# Patient Record
Sex: MALE | Race: WHITE | NOT HISPANIC OR LATINO | Employment: OTHER | URBAN - METROPOLITAN AREA
[De-identification: names, ages, dates, MRNs, and addresses within clinical notes are randomized per-mention and may not be internally consistent; named-entity substitution may affect disease eponyms.]

---

## 2017-07-12 ENCOUNTER — APPOINTMENT (OUTPATIENT)
Dept: LAB | Facility: CLINIC | Age: 77
End: 2017-07-12
Payer: COMMERCIAL

## 2017-07-12 ENCOUNTER — TRANSCRIBE ORDERS (OUTPATIENT)
Dept: LAB | Facility: CLINIC | Age: 77
End: 2017-07-12

## 2017-07-12 DIAGNOSIS — I48.91 ATRIAL FIBRILLATION, UNSPECIFIED TYPE (HCC): ICD-10-CM

## 2017-07-12 DIAGNOSIS — I48.91 ATRIAL FIBRILLATION, UNSPECIFIED TYPE (HCC): Primary | ICD-10-CM

## 2017-07-12 DIAGNOSIS — R35.1 NOCTURIA: ICD-10-CM

## 2017-07-12 DIAGNOSIS — E11.9 DIABETES MELLITUS WITHOUT COMPLICATION (HCC): ICD-10-CM

## 2017-07-12 LAB
BACTERIA UR QL AUTO: NORMAL /HPF
BILIRUB UR QL STRIP: NEGATIVE
CLARITY UR: CLEAR
COLOR UR: YELLOW
EST. AVERAGE GLUCOSE BLD GHB EST-MCNC: 189 MG/DL
GLUCOSE UR STRIP-MCNC: ABNORMAL MG/DL
HBA1C MFR BLD: 8.2 % (ref 4.2–6.3)
HGB UR QL STRIP.AUTO: NEGATIVE
HYALINE CASTS #/AREA URNS LPF: NORMAL /LPF
INR PPP: 2.59 (ref 0.86–1.16)
KETONES UR STRIP-MCNC: NEGATIVE MG/DL
LEUKOCYTE ESTERASE UR QL STRIP: NEGATIVE
NITRITE UR QL STRIP: NEGATIVE
NON-SQ EPI CELLS URNS QL MICRO: NORMAL /HPF
PH UR STRIP.AUTO: 6 [PH] (ref 4.5–8)
PROT UR STRIP-MCNC: ABNORMAL MG/DL
PROTHROMBIN TIME: 28.1 SECONDS (ref 12.1–14.4)
PSA SERPL-MCNC: 1 NG/ML (ref 0–4)
RBC #/AREA URNS AUTO: NORMAL /HPF
SP GR UR STRIP.AUTO: 1.02 (ref 1–1.03)
UROBILINOGEN UR QL STRIP.AUTO: 0.2 E.U./DL
WBC #/AREA URNS AUTO: NORMAL /HPF

## 2017-07-12 PROCEDURE — G0103 PSA SCREENING: HCPCS

## 2017-07-12 PROCEDURE — 85610 PROTHROMBIN TIME: CPT

## 2017-07-12 PROCEDURE — 83036 HEMOGLOBIN GLYCOSYLATED A1C: CPT

## 2017-07-12 PROCEDURE — 36415 COLL VENOUS BLD VENIPUNCTURE: CPT

## 2017-07-12 PROCEDURE — 81001 URINALYSIS AUTO W/SCOPE: CPT | Performed by: UROLOGY

## 2018-09-10 ENCOUNTER — APPOINTMENT (EMERGENCY)
Dept: CT IMAGING | Facility: HOSPITAL | Age: 78
End: 2018-09-10
Payer: COMMERCIAL

## 2018-09-10 ENCOUNTER — HOSPITAL ENCOUNTER (EMERGENCY)
Facility: HOSPITAL | Age: 78
Discharge: OTHER INSTITUTION | End: 2018-09-11
Attending: EMERGENCY MEDICINE | Admitting: EMERGENCY MEDICINE
Payer: COMMERCIAL

## 2018-09-10 ENCOUNTER — APPOINTMENT (EMERGENCY)
Dept: RADIOLOGY | Facility: HOSPITAL | Age: 78
End: 2018-09-10
Payer: COMMERCIAL

## 2018-09-10 DIAGNOSIS — R00.1 BRADYCARDIA: ICD-10-CM

## 2018-09-10 DIAGNOSIS — G45.9 TRANSIENT CEREBRAL ISCHEMIA, UNSPECIFIED TYPE: Primary | ICD-10-CM

## 2018-09-10 DIAGNOSIS — R29.810 FACIAL DROOP: ICD-10-CM

## 2018-09-10 DIAGNOSIS — I10 ACCELERATED HYPERTENSION: ICD-10-CM

## 2018-09-10 LAB
ABO GROUP BLD: NORMAL
ANION GAP SERPL CALCULATED.3IONS-SCNC: 8 MMOL/L (ref 4–13)
APTT PPP: 36 SECONDS (ref 24–36)
BLD GP AB SCN SERPL QL: NEGATIVE
BUN SERPL-MCNC: 20 MG/DL (ref 7–25)
CALCIUM SERPL-MCNC: 8.9 MG/DL (ref 8.6–10.5)
CHLORIDE SERPL-SCNC: 105 MMOL/L (ref 98–107)
CO2 SERPL-SCNC: 27 MMOL/L (ref 21–31)
CREAT SERPL-MCNC: 1.33 MG/DL (ref 0.7–1.3)
ERYTHROCYTE [DISTWIDTH] IN BLOOD BY AUTOMATED COUNT: 17.5 % (ref 11.5–14.5)
GFR SERPL CREATININE-BSD FRML MDRD: 50 ML/MIN/1.73SQ M
GLUCOSE SERPL-MCNC: 186 MG/DL (ref 65–140)
GLUCOSE SERPL-MCNC: 194 MG/DL (ref 65–99)
HCT VFR BLD AUTO: 37.3 % (ref 36.5–49.3)
HGB BLD-MCNC: 12.6 G/DL (ref 14–18)
INR PPP: 2.06 (ref 0.9–1.5)
MCH RBC QN AUTO: 31.5 PG (ref 26–34)
MCHC RBC AUTO-ENTMCNC: 33.9 G/DL (ref 31–37)
MCV RBC AUTO: 93 FL (ref 81–99)
PLATELET # BLD AUTO: 148 THOUSANDS/UL (ref 149–390)
PMV BLD AUTO: 8.6 FL (ref 8.6–11.7)
POTASSIUM SERPL-SCNC: 3.9 MMOL/L (ref 3.5–5.5)
PROTHROMBIN TIME: 24.2 SECONDS (ref 10.1–12.9)
RBC # BLD AUTO: 4.01 MILLION/UL (ref 4.3–5.9)
RH BLD: NEGATIVE
SODIUM SERPL-SCNC: 140 MMOL/L (ref 134–143)
SPECIMEN EXPIRATION DATE: NORMAL
WBC # BLD AUTO: 6.6 THOUSAND/UL (ref 4.8–10.8)

## 2018-09-10 PROCEDURE — 93005 ELECTROCARDIOGRAM TRACING: CPT

## 2018-09-10 PROCEDURE — 86850 RBC ANTIBODY SCREEN: CPT | Performed by: EMERGENCY MEDICINE

## 2018-09-10 PROCEDURE — 80048 BASIC METABOLIC PNL TOTAL CA: CPT | Performed by: EMERGENCY MEDICINE

## 2018-09-10 PROCEDURE — 86901 BLOOD TYPING SEROLOGIC RH(D): CPT | Performed by: EMERGENCY MEDICINE

## 2018-09-10 PROCEDURE — 85610 PROTHROMBIN TIME: CPT | Performed by: EMERGENCY MEDICINE

## 2018-09-10 PROCEDURE — 86900 BLOOD TYPING SEROLOGIC ABO: CPT | Performed by: EMERGENCY MEDICINE

## 2018-09-10 PROCEDURE — 36415 COLL VENOUS BLD VENIPUNCTURE: CPT | Performed by: EMERGENCY MEDICINE

## 2018-09-10 PROCEDURE — 70498 CT ANGIOGRAPHY NECK: CPT

## 2018-09-10 PROCEDURE — 82948 REAGENT STRIP/BLOOD GLUCOSE: CPT

## 2018-09-10 PROCEDURE — 85730 THROMBOPLASTIN TIME PARTIAL: CPT | Performed by: EMERGENCY MEDICINE

## 2018-09-10 PROCEDURE — 70496 CT ANGIOGRAPHY HEAD: CPT

## 2018-09-10 PROCEDURE — 85027 COMPLETE CBC AUTOMATED: CPT | Performed by: EMERGENCY MEDICINE

## 2018-09-10 PROCEDURE — 71045 X-RAY EXAM CHEST 1 VIEW: CPT

## 2018-09-10 PROCEDURE — 96374 THER/PROPH/DIAG INJ IV PUSH: CPT

## 2018-09-10 RX ORDER — DIGOXIN 125 MCG
250 TABLET ORAL DAILY
COMMUNITY
End: 2020-01-21

## 2018-09-10 RX ORDER — FINASTERIDE 5 MG/1
5 TABLET, FILM COATED ORAL DAILY
COMMUNITY

## 2018-09-10 RX ORDER — ATROPINE SULFATE 0.4 MG/ML
0.4 AMPUL (ML) INJECTION ONCE
Status: DISCONTINUED | OUTPATIENT
Start: 2018-09-10 | End: 2018-09-10 | Stop reason: CLARIF

## 2018-09-10 RX ORDER — ASPIRIN 81 MG/1
81 TABLET ORAL DAILY
COMMUNITY
End: 2020-01-27 | Stop reason: HOSPADM

## 2018-09-10 RX ORDER — LEVETIRACETAM 500 MG/1
500 TABLET ORAL EVERY 12 HOURS SCHEDULED
COMMUNITY

## 2018-09-10 RX ORDER — ATROPINE SULFATE 0.1 MG/ML
0.4 INJECTION INTRAVENOUS ONCE
Status: COMPLETED | OUTPATIENT
Start: 2018-09-10 | End: 2018-09-10

## 2018-09-10 RX ORDER — CARVEDILOL 6.25 MG/1
6.25 TABLET ORAL 2 TIMES DAILY WITH MEALS
COMMUNITY
End: 2018-11-29 | Stop reason: SDUPTHER

## 2018-09-10 RX ORDER — SERTRALINE HYDROCHLORIDE 100 MG/1
50 TABLET, FILM COATED ORAL DAILY
COMMUNITY

## 2018-09-10 RX ORDER — WARFARIN SODIUM 5 MG/1
TABLET ORAL
COMMUNITY
End: 2020-01-21

## 2018-09-10 RX ORDER — GLIPIZIDE 10 MG/1
TABLET ORAL
COMMUNITY

## 2018-09-10 RX ORDER — ALLOPURINOL 100 MG/1
100 TABLET ORAL DAILY
COMMUNITY

## 2018-09-10 RX ADMIN — IOHEXOL 85 ML: 350 INJECTION, SOLUTION INTRAVENOUS at 19:53

## 2018-09-10 RX ADMIN — ATROPINE SULFATE 0.4 MG: 0.1 INJECTION PARENTERAL at 20:42

## 2018-09-10 NOTE — ED PROVIDER NOTES
History  Chief Complaint   Patient presents with    Facial Droop     Last time seen normal was 1600 rt facial droop RUE residual weakness from CVA in 2015        History provided by:  Patient and spouse  Medical Problem   Severity:  Moderate  Onset quality:  Sudden  Duration:  3 hours  Timing:  Constant  Progression:  Unchanged  Chronicity:  New      Prior to Admission Medications   Prescriptions Last Dose Informant Patient Reported? Taking?   allopurinol (ZYLOPRIM) 100 mg tablet   Yes Yes   Sig: Take 100 mg by mouth daily   aspirin (ECOTRIN LOW STRENGTH) 81 mg EC tablet   Yes Yes   Sig: Take 81 mg by mouth daily   carvedilol (COREG) 6 25 mg tablet   Yes Yes   Sig: Take 6 25 mg by mouth 2 (two) times a day with meals   digoxin (LANOXIN) 0 125 mg tablet   Yes Yes   Sig: Take 250 mcg by mouth daily     esomeprazole (NexIUM) 20 mg capsule   Yes Yes   Sig: Take 20 mg by mouth every morning before breakfast   finasteride (PROSCAR) 5 mg tablet   Yes Yes   Sig: Take 5 mg by mouth daily   glipiZIDE (GLUCOTROL) 10 mg tablet   Yes Yes   Sig: Take by mouth 2 (two) times a day before meals   levETIRAcetam (KEPPRA) 500 mg tablet   Yes Yes   Sig: Take 500 mg by mouth every 12 (twelve) hours   metFORMIN (GLUCOPHAGE) 500 mg tablet   Yes Yes   Sig: Take 500 mg by mouth 3 (three) times a day before meals   sertraline (ZOLOFT) 100 mg tablet   Yes Yes   Sig: Take 50 mg by mouth daily   warfarin (COUMADIN) 5 mg tablet   Yes Yes   Sig: Take by mouth daily      Facility-Administered Medications: None       Past Medical History:   Diagnosis Date    Cardiac disease     [facility] mellitus ([facility] [address] )     Hyperlipidemia     Hypertension     Seizures ([facility] [address] )     Stroke [facility])        Past Surgical History:   Procedure Laterality Date    CARDIAC SURGERY      bypass 2012       History reviewed  No pertinent family history  I have reviewed and agree with the history as documented      Social History   Substance Use Topics    Smoking status: Former Smoker     Quit date: 1988    Smokeless tobacco: Never Used    Alcohol use No        Review of Systems   Unable to perform ROS: Acuity of condition       Physical Exam  Physical Exam   Constitutional: He appears well-developed and well-nourished  Hard of hearing   HENT:   Head: Atraumatic  Right sided facial droop   Eyes: EOM are normal  Pupils are equal, round, and reactive to light  Neck: Normal range of motion  Neck supple  Cardiovascular: Regular rhythm  Bradycardia present  Pulmonary/Chest: Effort normal and breath sounds normal    Abdominal: Soft  Bowel sounds are normal    Neurological: He is alert  NIH Stroke Scale/Score (NIHSS)   RESULT SUMMARY:  3 points  NIH Stroke Scale      INPUTS:  1A: Level of consciousness -> 0 = Alert; keenly responsive  1B: Ask month and age -> 0 = Both questions right  1C: 'Blink eyes' & 'squeeze hands' -> 0 = Performs both tasks  2: Horizontal extraocular movements -> 0 = Normal  3: Visual fields -> 0 = No visual loss  4: Facial palsy -> 1 = Minor paralysis (flat nasolabial fold, smile asymetry)  5A: Left arm motor drift -> 0 = No drift for 10 seconds  5B: Right arm motor drift -> 1 = Drift, but doesn't hit bed  6A: Left leg motor drift -> 0 = No drift for 5 seconds  6B: Right leg motor drift -> 0 = No drift for 5 seconds  7: Limb Ataxia -> 0 = No ataxia  8: Sensation -> 0 = Normal; no sensory loss  9: Language/aphasia -> 0 = Normal; no aphasia  10: Dysarthria -> 1 = Mild-moderate dysarthria: slurring but can be understood  11: Extinction/inattention -> 0 = No abnormality     Skin: Skin is warm and dry  Psychiatric: He has a normal mood and affect  Nursing note and vitals reviewed        Vital Signs  ED Triage Vitals   Temp Pulse Respirations Blood Pressure SpO2   -- 09/10/18 1923 09/10/18 1923 09/10/18 1923 09/10/18 1923    (!) 48 18 (!) 171/64 92 %      Temp src Heart Rate Source Patient Position - Orthostatic VS BP Location FiO2 (%)   -- 09/10/18 2005 -- -- --    Monitor         Pain Score       --                  Vitals:    09/10/18 2216 09/10/18 2220 09/10/18 2223 09/11/18 0010   BP: (S) (!) 192/81   (!) 192/81   Pulse:  61 60 60       Visual Acuity  Visual Acuity      Most Recent Value   L Pupil Size (mm)  3   R Pupil Size (mm)  3   L Pupil Shape  Round   R Pupil Shape  Round          ED Medications  Medications   iohexol (OMNIPAQUE) 350 MG/ML injection (SINGLE-DOSE) 85 mL (85 mL Intravenous Given 9/10/18 1953)   atropine 1 mg/10 mL injection 0 4 mg (0 4 mg Intravenous Given 9/10/18 2042)       Diagnostic Studies  Results Reviewed     Procedure Component Value Units Date/Time    Basic metabolic panel [[id number]]  (Abnormal) Collected:  09/10/18 1928    Lab Status:  Final result Specimen:  Blood from Arm, Right Updated:  09/10/18 2008     Sodium 140 mmol/L      Potassium 3 9 mmol/L      Chloride 105 mmol/L      CO2 27 mmol/L      ANION GAP 8 mmol/L      BUN 20 mg/dL      Creatinine 1 33 (H) mg/dL      Glucose 194 (H) mg/dL      Calcium 8 9 mg/dL      eGFR 50 ml/min/1 73sq m     Narrative:         National Kidney Disease Education Program recommendations are as follows:  GFR calculation is accurate only with a steady state creatinine  Chronic Kidney disease less than 60 ml/min/1 73 sq  meters  Kidney failure less than 15 ml/min/1 73 sq  meters      Protime-INR [[id number]]  (Abnormal) Collected:  09/10/18 1928    Lab Status:  Final result Specimen:  Blood from Arm, Right Updated:  09/10/18 1956     Protime 24 2 (H) seconds      INR 2 06 (H)    APTT [[id number]]  (Normal) Collected:  09/10/18 1928    Lab Status:  Final result Specimen:  Blood from Arm, Right Updated:  09/10/18 1956     PTT 36 seconds     CBC [[id number]]  (Abnormal) Collected:  09/10/18 1928    Lab Status:  Final result Specimen:  Blood from Arm, Right Updated:  09/10/18 1941     WBC 6 60 Thousand/uL      RBC 4 01 (L) Million/uL      Hemoglobin 12 6 (L) g/dL      Hematocrit 37 3 %      MCV 93 fL MCH 31 5 pg      MCHC 33 9 g/dL      RDW 17 5 (H) %      Platelets 427 (L) Thousands/uL      MPV 8 6 fL     Fingerstick Glucose (POCT) [[id number]]  (Abnormal) Collected:  09/10/18 1918    Lab Status:  Final result Updated:  09/10/18 1919     POC Glucose 186 (H) mg/dl                  CTA stroke alert (head/neck)   Final Result by Desmond Keane (09/10 2110)   1  Evidence of prior left MCA territory infarction with chronic-appearing   occlusion of an M3 branch of the left middle cerebral artery  2   Moderate stenosis of the proximal left internal carotid artery of   approximately 60%, similar to prior MR angiogram          Signed by Cherelle Obrien II, MD      X-ray chest 1 view portable   Final Result by Perez Angeles (09/10 2033)   No acute pulmonary abnormality  Signed by Perez Angeles MD      CT stroke alert brain   Final Result by Anitha Hyde (09/10 1952)   Addendum 1 of 1 by Anitha Hyde (09/10 1955)    ORIGINAL REPORT    INDICATION:  Right-sided facial droop  Stroke protocol  Additional   History: Prior right-sided cerebrovascular attack  ORDERING PROVIDER:  Katlin Guajardo  TECHNIQUE:  CT of the brain was performed without contrast         Automated mA/kV exposure control was utilized and patient examination was   performed in strict accordance with principles of ALARA  RADIATION AMOUNT:  887 40 mGy-cm  COMPARISON:  10/05/2014 CT  FINDINGS:    There is no acute intracranial hemorrhage, midline shift, mass effect, or   extra-axial fluid collection  Gray-white differentiation is preserved  There is encephalomalacia in the left frontal lobe  This was seen in 2014   and is consistent with an old infarct  There is mild encephalomalacia in   the left occipital lobe unchanged from the prior exam as well  Ventricular system is within normal limits for age    Mild patchy areas of   low-attenuation are seen in the subcortical and deep periventricular white   matter suggesting areas of chronic microvascular ischemia  The skull base and calvarium are intact  The visualized paranasal sinuses   are unremarkable  The visualized orbits, globes, and mastoid air cells   are unremarkable  IMPRESSION:    No acute intracranial abnormalities  Old infarcts involving the left frontal and left occipital lobes  Chronic small vessel ischemic change  Signed by Brandy Sandhoff, MD    ADDENDUM #1    NOTIFICATION:  Non-critical findings were relayed by Radiology Support   Staff to Dr Gerri Zamudio on 09/10/2018 at 19:49  Signed by Brandy Sandhoff, MD      Final   No acute intracranial abnormalities  Old infarcts involving the left frontal and left occipital lobes  Chronic small vessel ischemic change  Signed by Brandy Sandhoff, MD                 Procedures  Procedures       Phone Contacts  ED Phone Contact    ED Course  ED Course as of Sep 11 0034   Mon Sep 10, 2018   1944 Discussed with [facility] transfer center, awaiting call back    1949 CT head shows no acute findings per radiology tech    1955 Type and screen   1959 Discussion with Dr Scar Jaimes, he does not have access to imaging, requests [facility] neurologist evaluate, he is willing to accept transfer    2010 Discussion with Dr Darrel Delgadillo     2010 CT stroke alert brain   2026 Discussed with Dr Scar Jaimes  MDM  Number of Diagnoses or Management Options  Diagnosis management comments: 7:22 PM this is a 77-year-old male with previous stroke reportedly mild right upper extremity weakness deficit as a residual fat, on Coumadin, reportedly compliant, presenting with stroke-like symptoms began about 3 hours ago  Per wife patient developed dysarthria around 4:00 p m  which was new, along with right-sided facial droop and worsening right upper extremity weakness  Patient immediately evaluated by me, NIH of 3 as documented the exam portion of this chart    He is on Coumadin which if his INR is therapeutic we would remove him from the window of tPA, also he has a relatively low NIH score, however will proceed with stroke alert expedite important studies and neurology consultation  Of note, patient received most of his prior level fairly abnormality reports having open heart surgery, anticipate transfer to this facility  7:30 PM Transfer center at [facility] contacted    MDM:   Imaging reviewed with [facility] neurologist who suspects patient has symptoms consistent with likely prior chronic left M2 lesion, also reportedly has moderate stenosis of both proximal and ICAs on CT angiogram   Given NIH score of 3, and therapeutic INR, no indication for tPA per [facility] neurologist, recommended patient was stable for transfer to [facility]  I discussed the case with [facility] neurologist who agrees with patient being appropriate for medicine with Neurology consult  No further deficits or evolution of symptoms emergency department  Of note, patient was bradycardic when he arrived to the emergency room, his heart rate dropped as low as the 30s, he was given 0 4 mg of atropine which did improve his heart rate  Dr Jane Howell given report of patient clinical course in emergency department concerns the transfer center, he accepts transfer to low level floor with Neurology consultation  Patient evaluated multiple times emergency room given stroke-like symptoms with clear communication with family at all times  Critical Care Procedure Note:   Authorized and Performed by: Valdez Phillip DO   Total critical care time: Approximately 50 minutes no  Due to a high probability of clinically significant, life threatening deterioration, the patient required my highest level of preparedness to intervene emergently and I personally spent this critical care time directly and personally managing the patient   This critical care time included obtaining a history; examining the patient; pulse oximetry; ordering and review of studies; arranging urgent treatment with development of a management plan; evaluation of patient's response to treatment; frequent reassessment; and, discussions with other providers  This critical care time was performed to assess and manage the high probability of imminent, life-threatening deterioration that could result in multi-organ failure  It was exclusive of separately billable procedures and treating other patients and teaching time  Please see MDM section and the rest of the note for further information on patient assessment and treatment  The patient presented with a condition in which there was a high probability of imminent or life-threatening deterioration, and critical care services (excluding separately billable procedures) totalled 30-74 minutes          Disposition  Final diagnoses:   Transient cerebral ischemia, unspecified type   Facial droop   Bradycardia   Accelerated hypertension     Time reflects when diagnosis was documented in both MDM as applicable and the Disposition within this note     Time User Action Codes Description Comment    9/11/2018 12:34 AM Carly Clam Add [G45 9] Transient cerebral ischemia, unspecified type     9/11/2018 12:34 AM Carly Clam Add [R29 810] Facial droop     9/11/2018 12:34 AM Carly Clam Add [R00 1] Bradycardia     9/11/2018 12:34 AM Carly Clam Add [I10] Accelerated hypertension       ED Disposition     ED Disposition Condition Comment    Transfer to [address] should be transferred out to [facility]      MD Documentation      Most Recent Value   Patient Condition  The patient has been stabilized such that within reasonable medical probability, no material deterioration of the patient condition or the condition of the unborn child(stef) is likely to result from the transfer   Reason for Transfer  Level of Care needed not available at this facility   Benefits of Transfer  Specialized equipment and/or services available at the receiving facility (Include comment)________________________   Risks of Transfer  Loss of IV   Accepting Physician  Tyrell Harmon MD   Accepting Facility Name, [facility]   Sending MD Dr Shabnam Salguero, DO   Provider Certification  General risk, such as traffic hazards, adverse weather conditions, rough terrain or turbulence, possible failure of equipment (including vehicle or aircraft), or consequences of actions of persons outside the control of the transport personnel      RN Documentation      [facility] [address] Name, [facility]   Bed Assignment  7A1A   Report Given to  Wyatt Rico RN at [facility]    Medications Reviewed with Next Provider of Service  Yes   Transport Mode  Ambulance   Level of Care  Advanced life support   Copies of Medical Records Sent  History and Physical, Orders, Radiology, EKG, Labs   Patient Belongings Disposition  Sent with patient   Transfer Date  09/11/18   Transfer Time  0010      Follow-up Information    None         Discharge Medication List as of 9/11/2018 12:28 AM      CONTINUE these medications which have NOT CHANGED    Details   allopurinol (ZYLOPRIM) 100 mg tablet Take 100 mg by mouth daily, Historical Med      aspirin (ECOTRIN LOW STRENGTH) 81 mg EC tablet Take 81 mg by mouth daily, Historical Med      carvedilol (COREG) 6 25 mg tablet Take 6 25 mg by mouth 2 (two) times a day with meals, Historical Med      digoxin (LANOXIN) 0 125 mg tablet Take 250 mcg by mouth daily  , Historical Med      esomeprazole (NexIUM) 20 mg capsule Take 20 mg by mouth every morning before breakfast, Historical Med      finasteride (PROSCAR) 5 mg tablet Take 5 mg by mouth daily, Historical Med      glipiZIDE (GLUCOTROL) 10 mg tablet Take by mouth 2 (two) times a day before meals, Historical Med      levETIRAcetam (KEPPRA) 500 mg tablet Take 500 mg by mouth every 12 (twelve) hours, Historical Med      metFORMIN (GLUCOPHAGE) 500 mg tablet Take 500 mg by mouth 3 (three) times a day before meals, Historical Med      sertraline (ZOLOFT) 100 mg tablet Take 50 mg by mouth daily, Historical Med      warfarin (COUMADIN) 5 mg tablet Take by mouth daily, Historical Med           No discharge procedures on file      ED Provider  Electronically Signed by           Grace Stevens, DO  09/10/18 Kwasi Vera 56, DO  09/11/18 [id number]

## 2018-09-11 VITALS
WEIGHT: 209.44 LBS | DIASTOLIC BLOOD PRESSURE: 81 MMHG | HEART RATE: 60 BPM | OXYGEN SATURATION: 96 % | RESPIRATION RATE: 15 BRPM | HEIGHT: 67 IN | BODY MASS INDEX: 32.87 KG/M2 | SYSTOLIC BLOOD PRESSURE: 192 MMHG

## 2018-09-11 LAB
ATRIAL RATE: 39 BPM
P AXIS: 48 DEGREES
PR INTERVAL: 252 MS
QRS AXIS: 73 DEGREES
QRSD INTERVAL: 142 MS
QT INTERVAL: 430 MS
QTC INTERVAL: 346 MS
T WAVE AXIS: -43 DEGREES
VENTRICULAR RATE: 39 BPM

## 2018-09-11 PROCEDURE — 99285 EMERGENCY DEPT VISIT HI MDM: CPT

## 2018-09-11 NOTE — EMTALA/ACUTE CARE TRANSFER
[address] [facility]   [address] [id number]  [phone]  Dept: [phone]      EMTALA TRANSFER CONSENT    NAME Jen Omer                                         1940                              MRN [id number]    I have been informed of my rights regarding examination, treatment, and transfer   by Dr Arleen Franz DO    Benefits: Specialized equipment and/or services available at the receiving facility (Include comment)________________________    Risks: Loss of IV      Transfer Request   I acknowledge that my medical condition has been evaluated and explained to me by the emergency department physician or other qualified medical person and/or my attending physician who has recommended and offered to me further medical examination and treatment  I understand the Hospital's obligation with respect to the treatment and stabilization of my emergency medical condition  I nevertheless request to be transferred  I release the Hospital, the doctor, and any other persons caring for me from all responsibility or liability for any injury or ill effects that may result from my transfer and agree to accept all responsibility for the consequences of my choice to transfer, rather than receive stabilizing treatment at the Hospital  I understand that because the transfer is my request, my insurance may not provide reimbursement for the services  The Hospital will assist and direct me and my family in how to make arrangements for transfer, but the hospital is not liable for any fees charged by the transport service  In spite of this understanding, I refuse to consent to further medical examination and treatment which has been offered to me, and request transfer to  [facility] Name, [address] : [facility]  I authorize the performance of emergency medical procedures and treatments upon me in both transit and upon arrival at the receiving facility  Additionally, I authorize the release of any and all medical records to the receiving facility and request they be transported with me, if possible  I authorize the performance of emergency medical procedures and treatments upon me in both transit and upon arrival at the receiving facility  Additionally, I authorize the release of any and all medical records to the receiving facility and request they be transported with me, if possible  I understand that the safest mode of transportation during a medical emergency is an ambulance and that the Hospital advocates the use of this mode of transport  Risks of traveling to the receiving facility by car, including absence of medical control, life sustaining equipment, such as oxygen, and medical personnel has been explained to me and I fully understand them  (LIZETTE CORRECT BOX BELOW)  [  ]  I consent to the stated transfer and to be transported by ambulance/helicopter  [  ]  I consent to the stated transfer, but refuse transportation by ambulance and accept full responsibility for my transportation by car  I understand the risks of non-ambulance transfers and I exonerate the Hospital and its staff from any deterioration in my condition that results from this refusal     X___________________________________________    DATE  18  TIME________  Signature of patient or legally responsible individual signing on patient behalf           RELATIONSHIP TO PATIENT_________________________          Provider Certification    NAME Diana Dutta                                        [facility] 1940                              MRN [id number]    A medical screening exam was performed on the above named patient  Based on the examination:    Condition Necessitating Transfer There were no encounter diagnoses      Patient Condition: The patient has been stabilized such that within reasonable medical probability, no material deterioration of the patient condition or the condition of the unborn child(stef) is likely to result from the transfer    Reason for Transfer: Level of Care needed not available at this facility    Transfer Requirements: [address]   · Space available and qualified personnel available for treatment as acknowledged by    · Agreed to accept transfer and to provide appropriate medical treatment as acknowledged by       Miguel Coronel MD  · Appropriate medical records of the examination and treatment of the patient are provided at the time of transfer   [address] _______  · Transfer will be performed by qualified personnel from    and appropriate transfer equipment as required, including the use of necessary and appropriate life support measures  Provider Certification: I have examined the patient and explained the following risks and benefits of being transferred/refusing transfer to the patient/family:  General risk, such as traffic hazards, adverse weather conditions, rough terrain or turbulence, possible failure of equipment (including vehicle or aircraft), or consequences of actions of persons outside the control of the transport personnel      Based on these reasonable risks and benefits to the patient and/or the unborn child(stef), and based upon the information available at the time of the patients examination, I certify that the medical benefits reasonably to be expected from the provision of appropriate medical treatments at another medical facility outweigh the increasing risks, if any, to the individuals medical condition, and in the case of labor to the unborn child, from effecting the transfer      X____________________________________________ DATE 09/11/18        TIME_______      ORIGINAL - SEND TO MEDICAL RECORDS   COPY - SEND WITH PATIENT DURING TRANSFER

## 2018-09-13 ENCOUNTER — TELEPHONE (OUTPATIENT)
Dept: CARDIOLOGY CLINIC | Facility: CLINIC | Age: 78
End: 2018-09-13

## 2018-09-13 NOTE — TELEPHONE ENCOUNTER
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

## 2018-11-29 DIAGNOSIS — I25.10 CORONARY ARTERY DISEASE INVOLVING NATIVE CORONARY ARTERY OF NATIVE HEART WITHOUT ANGINA PECTORIS: Primary | ICD-10-CM

## 2018-11-30 RX ORDER — CARVEDILOL 6.25 MG/1
TABLET ORAL
Qty: 180 TABLET | Refills: 3 | Status: SHIPPED | OUTPATIENT
Start: 2018-11-30 | End: 2020-02-24

## 2020-01-21 ENCOUNTER — APPOINTMENT (EMERGENCY)
Dept: RADIOLOGY | Facility: HOSPITAL | Age: 80
End: 2020-01-21
Payer: COMMERCIAL

## 2020-01-21 ENCOUNTER — HOSPITAL ENCOUNTER (EMERGENCY)
Facility: HOSPITAL | Age: 80
End: 2020-01-21
Attending: EMERGENCY MEDICINE | Admitting: EMERGENCY MEDICINE
Payer: COMMERCIAL

## 2020-01-21 ENCOUNTER — APPOINTMENT (EMERGENCY)
Dept: CT IMAGING | Facility: HOSPITAL | Age: 80
End: 2020-01-21
Payer: COMMERCIAL

## 2020-01-21 ENCOUNTER — HOSPITAL ENCOUNTER (INPATIENT)
Facility: HOSPITAL | Age: 80
LOS: 6 days | Discharge: NURSING, REHAB OR LONG-TERM CARE | DRG: 813 | End: 2020-01-27
Attending: SURGERY | Admitting: SURGERY
Payer: COMMERCIAL

## 2020-01-21 VITALS
HEIGHT: 70 IN | OXYGEN SATURATION: 94 % | TEMPERATURE: 97.7 F | HEART RATE: 60 BPM | DIASTOLIC BLOOD PRESSURE: 61 MMHG | WEIGHT: 214.38 LBS | BODY MASS INDEX: 30.69 KG/M2 | SYSTOLIC BLOOD PRESSURE: 121 MMHG | RESPIRATION RATE: 20 BRPM

## 2020-01-21 DIAGNOSIS — Z79.01 ANTICOAGULATED: ICD-10-CM

## 2020-01-21 DIAGNOSIS — E16.2 HYPOGLYCEMIA: ICD-10-CM

## 2020-01-21 DIAGNOSIS — S06.5X9A SUBDURAL HEMATOMA (HCC): Primary | ICD-10-CM

## 2020-01-21 DIAGNOSIS — R93.89 ABNORMAL CHEST X-RAY: ICD-10-CM
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
## 2020-01-21 DIAGNOSIS — E11.9 TYPE 2 DIABETES MELLITUS WITHOUT COMPLICATION, WITHOUT LONG-TERM CURRENT USE OF INSULIN (HCC): ICD-10-CM

## 2020-01-21 PROBLEM — S06.5XAA SUBDURAL HEMATOMA: Status: ACTIVE | Noted: 2020-01-21

## 2020-01-21 LAB
ABO GROUP BLD: NORMAL
ABO GROUP BLD: NORMAL
ALBUMIN SERPL BCP-MCNC: 4.4 G/DL (ref 3.5–5.7)
ALP SERPL-CCNC: 31 U/L (ref 55–165)
ALT SERPL W P-5'-P-CCNC: 7 U/L (ref 7–52)
ANION GAP SERPL CALCULATED.3IONS-SCNC: 10 MMOL/L (ref 4–13)
ANION GAP SERPL CALCULATED.3IONS-SCNC: 6 MMOL/L (ref 4–13)
AST SERPL W P-5'-P-CCNC: 17 U/L (ref 13–39)
BASOPHILS # BLD AUTO: 0 THOUSANDS/ΜL (ref 0–0.1)
BASOPHILS NFR BLD AUTO: 1 % (ref 0–2)
BILIRUB SERPL-MCNC: 0.8 MG/DL (ref 0.2–1)
BLD GP AB SCN SERPL QL: NEGATIVE
BUN SERPL-MCNC: 24 MG/DL (ref 5–25)
BUN SERPL-MCNC: 25 MG/DL (ref 7–25)
CALCIUM SERPL-MCNC: 8.9 MG/DL (ref 8.3–10.1)
CALCIUM SERPL-MCNC: 9.2 MG/DL (ref 8.6–10.5)
CHLORIDE SERPL-SCNC: 106 MMOL/L (ref 98–107)
CHLORIDE SERPL-SCNC: 111 MMOL/L (ref 100–108)
CO2 SERPL-SCNC: 24 MMOL/L (ref 21–31)
CO2 SERPL-SCNC: 25 MMOL/L (ref 21–32)
CREAT SERPL-MCNC: 1.27 MG/DL (ref 0.6–1.3)
CREAT SERPL-MCNC: 1.42 MG/DL (ref 0.7–1.3)
EOSINOPHIL # BLD AUTO: 0.1 THOUSAND/ΜL (ref 0–0.61)
EOSINOPHIL NFR BLD AUTO: 1 % (ref 0–5)
ERYTHROCYTE [DISTWIDTH] IN BLOOD BY AUTOMATED COUNT: 18.8 % (ref 11.5–14.5)
GFR SERPL CREATININE-BSD FRML MDRD: 46 ML/MIN/1.73SQ M
GFR SERPL CREATININE-BSD FRML MDRD: 53 ML/MIN/1.73SQ M
GLUCOSE SERPL-MCNC: 119 MG/DL (ref 65–99)
GLUCOSE SERPL-MCNC: 129 MG/DL (ref 65–140)
GLUCOSE SERPL-MCNC: 137 MG/DL (ref 65–140)
GLUCOSE SERPL-MCNC: 28 MG/DL (ref 65–140)
GLUCOSE SERPL-MCNC: 37 MG/DL (ref 65–140)
GLUCOSE SERPL-MCNC: 49 MG/DL (ref 65–140)
HCT VFR BLD AUTO: 33.9 % (ref 42–47)
HGB BLD-MCNC: 11 G/DL (ref 14–18)
INR PPP: 2.1 (ref 0.9–1.5)
LYMPHOCYTES # BLD AUTO: 0.6 THOUSANDS/ΜL (ref 0.6–4.47)
LYMPHOCYTES NFR BLD AUTO: 7 % (ref 21–51)
MCH RBC QN AUTO: 32.5 PG (ref 26–34)
MCHC RBC AUTO-ENTMCNC: 32.4 G/DL (ref 31–37)
MCV RBC AUTO: 100 FL (ref 81–99)
MONOCYTES # BLD AUTO: 0.4 THOUSAND/ΜL (ref 0.17–1.22)
MONOCYTES NFR BLD AUTO: 5 % (ref 2–12)
NEUTROPHILS # BLD AUTO: 7.1 THOUSANDS/ΜL (ref 1.4–6.5)
NEUTS SEG NFR BLD AUTO: 86 % (ref 42–75)
PLATELET # BLD AUTO: 138 THOUSANDS/UL (ref 149–390)
PMV BLD AUTO: 8.8 FL (ref 8.6–11.7)
POTASSIUM SERPL-SCNC: 3.5 MMOL/L (ref 3.5–5.3)
POTASSIUM SERPL-SCNC: 4.4 MMOL/L (ref 3.5–5.5)
PROT SERPL-MCNC: 7.2 G/DL (ref 6.4–8.9)
PROTHROMBIN TIME: 24.5 SECONDS (ref 10.2–13)
RBC # BLD AUTO: 3.38 MILLION/UL (ref 4.3–5.9)
RH BLD: NEGATIVE
RH BLD: NEGATIVE
SODIUM SERPL-SCNC: 140 MMOL/L (ref 134–143)
SODIUM SERPL-SCNC: 142 MMOL/L (ref 136–145)
SPECIMEN EXPIRATION DATE: NORMAL
WBC # BLD AUTO: 8.2 THOUSAND/UL (ref 4.8–10.8)

## 2020-01-21 PROCEDURE — 99284 EMERGENCY DEPT VISIT MOD MDM: CPT

## 2020-01-21 PROCEDURE — 97163 PT EVAL HIGH COMPLEX 45 MIN: CPT

## 2020-01-21 PROCEDURE — 99291 CRITICAL CARE FIRST HOUR: CPT | Performed by: EMERGENCY MEDICINE

## 2020-01-21 PROCEDURE — C9132 KCENTRA, PER I.U.: HCPCS | Performed by: EMERGENCY MEDICINE

## 2020-01-21 PROCEDURE — 97167 OT EVAL HIGH COMPLEX 60 MIN: CPT

## 2020-01-21 PROCEDURE — 99223 1ST HOSP IP/OBS HIGH 75: CPT | Performed by: NEUROLOGICAL SURGERY

## 2020-01-21 PROCEDURE — 82948 REAGENT STRIP/BLOOD GLUCOSE: CPT

## 2020-01-21 PROCEDURE — 86850 RBC ANTIBODY SCREEN: CPT | Performed by: EMERGENCY MEDICINE

## 2020-01-21 PROCEDURE — 70450 CT HEAD/BRAIN W/O DYE: CPT

## 2020-01-21 PROCEDURE — 99223 1ST HOSP IP/OBS HIGH 75: CPT | Performed by: SURGERY

## 2020-01-21 PROCEDURE — 85610 PROTHROMBIN TIME: CPT | Performed by: EMERGENCY MEDICINE

## 2020-01-21 PROCEDURE — 86901 BLOOD TYPING SEROLOGIC RH(D): CPT | Performed by: EMERGENCY MEDICINE

## 2020-01-21 PROCEDURE — 80048 BASIC METABOLIC PNL TOTAL CA: CPT | Performed by: SURGERY

## 2020-01-21 PROCEDURE — 93005 ELECTROCARDIOGRAM TRACING: CPT

## 2020-01-21 PROCEDURE — NC001 PR NO CHARGE: Performed by: SURGERY

## 2020-01-21 PROCEDURE — 71046 X-RAY EXAM CHEST 2 VIEWS: CPT

## 2020-01-21 PROCEDURE — 85025 COMPLETE CBC W/AUTO DIFF WBC: CPT | Performed by: EMERGENCY MEDICINE

## 2020-01-21 PROCEDURE — 96374 THER/PROPH/DIAG INJ IV PUSH: CPT

## 2020-01-21 PROCEDURE — 80053 COMPREHEN METABOLIC PANEL: CPT | Performed by: EMERGENCY MEDICINE

## 2020-01-21 PROCEDURE — 86900 BLOOD TYPING SEROLOGIC ABO: CPT | Performed by: EMERGENCY MEDICINE

## 2020-01-21 PROCEDURE — 99285 EMERGENCY DEPT VISIT HI MDM: CPT

## 2020-01-21 PROCEDURE — 96365 THER/PROPH/DIAG IV INF INIT: CPT

## 2020-01-21 PROCEDURE — 36415 COLL VENOUS BLD VENIPUNCTURE: CPT | Performed by: EMERGENCY MEDICINE

## 2020-01-21 RX ORDER — ACETAMINOPHEN 325 MG/1
650 TABLET ORAL EVERY 6 HOURS PRN
Status: DISCONTINUED | OUTPATIENT
Start: 2020-01-21 | End: 2020-01-27 | Stop reason: HOSPADM

## 2020-01-21 RX ORDER — PANTOPRAZOLE SODIUM 20 MG/1
20 TABLET, DELAYED RELEASE ORAL
Status: DISCONTINUED | OUTPATIENT
Start: 2020-01-22 | End: 2020-01-27 | Stop reason: HOSPADM

## 2020-01-21 RX ORDER — FINASTERIDE 5 MG/1
5 TABLET, FILM COATED ORAL DAILY
Status: DISCONTINUED | OUTPATIENT
Start: 2020-01-21 | End: 2020-01-27 | Stop reason: HOSPADM

## 2020-01-21 RX ORDER — DEXTROSE MONOHYDRATE 25 G/50ML
INJECTION, SOLUTION INTRAVENOUS
Status: COMPLETED
Start: 2020-01-21 | End: 2020-01-21

## 2020-01-21 RX ORDER — CLOPIDOGREL BISULFATE 75 MG/1
TABLET ORAL DAILY
COMMUNITY
End: 2020-01-27 | Stop reason: HOSPADM

## 2020-01-21 RX ORDER — ATORVASTATIN CALCIUM 10 MG/1
10 TABLET, FILM COATED ORAL DAILY
Status: DISCONTINUED | OUTPATIENT
Start: 2020-01-21 | End: 2020-01-27 | Stop reason: HOSPADM

## 2020-01-21 RX ORDER — ALLOPURINOL 100 MG/1
100 TABLET ORAL DAILY
Status: DISCONTINUED | OUTPATIENT
Start: 2020-01-21 | End: 2020-01-27 | Stop reason: HOSPADM

## 2020-01-21 RX ORDER — LEVETIRACETAM 500 MG/1
500 TABLET ORAL EVERY 12 HOURS SCHEDULED
Status: DISCONTINUED | OUTPATIENT
Start: 2020-01-21 | End: 2020-01-27 | Stop reason: HOSPADM

## 2020-01-21 RX ORDER — OXYCODONE HYDROCHLORIDE 5 MG/1
5 TABLET ORAL EVERY 4 HOURS PRN
Status: DISCONTINUED | OUTPATIENT
Start: 2020-01-21 | End: 2020-01-27 | Stop reason: HOSPADM

## 2020-01-21 RX ORDER — DEXTROSE MONOHYDRATE 25 G/50ML
50 INJECTION, SOLUTION INTRAVENOUS ONCE
Status: COMPLETED | OUTPATIENT
Start: 2020-01-21 | End: 2020-01-21

## 2020-01-21 RX ORDER — ATORVASTATIN CALCIUM 10 MG/1
40 TABLET, FILM COATED ORAL DAILY
COMMUNITY

## 2020-01-21 RX ORDER — OXYCODONE HYDROCHLORIDE 5 MG/1
2.5 TABLET ORAL EVERY 4 HOURS PRN
Status: DISCONTINUED | OUTPATIENT
Start: 2020-01-21 | End: 2020-01-27 | Stop reason: HOSPADM

## 2020-01-21 RX ADMIN — DESMOPRESSIN ACETATE 28.4 MCG: 4 SOLUTION INTRAVENOUS at 09:54

## 2020-01-21 RX ADMIN — DEXTROSE MONOHYDRATE 50 ML: 25 INJECTION, SOLUTION INTRAVENOUS at 22:01

## 2020-01-21 RX ADMIN — CEFTRIAXONE 2000 MG: 2 INJECTION, POWDER, FOR SOLUTION INTRAMUSCULAR; INTRAVENOUS at 15:25

## 2020-01-21 RX ADMIN — AZITHROMYCIN MONOHYDRATE 500 MG: 500 INJECTION, POWDER, LYOPHILIZED, FOR SOLUTION INTRAVENOUS at 20:15

## 2020-01-21 RX ADMIN — ATORVASTATIN CALCIUM 10 MG: 10 TABLET, FILM COATED ORAL at 14:13

## 2020-01-21 RX ADMIN — DEXTROSE 50 % IN WATER (D50W) INTRAVENOUS SYRINGE 50 ML: at 22:01

## 2020-01-21 RX ADMIN — FINASTERIDE 5 MG: 5 TABLET, FILM COATED ORAL at 14:13

## 2020-01-21 RX ADMIN — SERTRALINE HYDROCHLORIDE 50 MG: 50 TABLET ORAL at 14:13

## 2020-01-21 RX ADMIN — LEVETIRACETAM 500 MG: 500 TABLET, FILM COATED ORAL at 14:13

## 2020-01-21 RX ADMIN — LEVETIRACETAM 500 MG: 500 TABLET, FILM COATED ORAL at 20:15

## 2020-01-21 RX ADMIN — Medication 5000 UNITS: at 10:06

## 2020-01-21 NOTE — CONSULTS
Consult- Genesis Born 1940, 78 y o  male MRN: [id number]    [facility] Encounter: [id number]    Primary Care Provider: Erin Sanchez DO   Date and time admitted to hospital: 1/21/2020 11:26 AM      Inpatient consult to Neurosurgery  Consult performed by: Dilma Hewitt PA-C  Consult ordered by: Mariaelena Weaver PA-C          Subdural hematoma [facility])  Assessment & Plan  S/p multiple falls    Imaging:   · CT head wo 1/21/20: Acute left frontal parafalcine subdural hematoma  No mass effect or shift  Plan:   · Continue to monitor neurological exam  GCS 14 with mild confusion  FC  No focal weakness  No drift  · STAT repeat CT head with any neurological decline including drop GCS 2pt in 1 hr  · Hold all anti-platelet and anticoagulation medications  · Patient was on ASA/plavix and Elquis prior to admission for history of CABG/stent/stroke  · Given DDAVP and Kcentra  · Keppra x 1 wk for sz ppx  · Mobilize with physical and occupational therapy  · DVT prophylaxis:  Bilateral SCDs only at this time  Defer pharmacological DVT prophylaxis until stable repeat CT head obtained  · Order repeat CT head to be completed tomorrow  · Will continue to follow  Call with questions/concerns  History of Present Illness     HPI: Genesis Born is a 78 y o  male with PMH including history of stroke, skin cancer, seizures, hypertension, hyperlipidemia, diabetes and CAD status post CABG on aspirin, Plavix and Xarelto who presents as transfer for subdural hematoma  Patient states that he has woken on the floor on several occasions over the last week and difficulty getting up  He states he was climbing on the recliner and had multiple low level falls without any significant head trauma  Patient was some confusion which is not baseline  Per report his wife had difficulty awaking him  EMS found to be hypoglycemic and was given T10  Patient woke without complaints    He originally was taking to [facility] or patient was noted to be febrile  Chest x-ray was concerning for pneumonia in CT head showed subdural hematoma  Patient received reversal agents including DDAVP and Kcentra for use of aspirin, Plavix and Xarelto  Patient is tangential and unclear of accuracy with his history  He did correctly states that he had surgery for cancer removal of his left shoulder approximately two weeks ago with sutures still in place  He currently denies any headaches, vision or speech difficulties  Admits to ongoing bilateral lower extremity weakness  Denies any sensory deficits  Denies any chest pain or shortness of breath  Denies abdominal pain, nausea or vomiting  Review of Systems   Constitutional: Negative for activity change, fatigue and fever  HENT: Negative for trouble swallowing and voice change  Eyes: Negative for visual disturbance  Respiratory: Negative for cough and shortness of breath  Cardiovascular: Negative for chest pain and leg swelling  Gastrointestinal: Negative for abdominal pain, nausea and vomiting  Genitourinary: Negative for decreased urine volume and difficulty urinating  Musculoskeletal: Negative for back pain and neck pain  Skin: Negative for pallor, rash and wound  Neurological: Positive for weakness (BLE )  Negative for dizziness, tremors, seizures, speech difficulty, light-headedness, numbness and headaches  Psychiatric/Behavioral: Positive for confusion  Negative for agitation         Historical Information   Past Medical History:   Diagnosis Date    Cardiac disease     [facility] mellitus [facility] [address] [facility]     Hyperlipidemia     Hypertension     Seizures ([facility] [address] )     Skin cancer     Stroke [facility])      Past Surgical History:   Procedure Laterality Date    CARDIAC SURGERY      bypass 2012     Social History     Substance and Sexual Activity   Alcohol Use Never    Frequency: Never    Binge frequency: Never     Social History     Substance and Sexual Activity   Drug Use Not on file     Social History     Tobacco Use   Smoking Status Former Smoker    Last attempt to quit: Jacek Barnes Years since quittin 0   Smokeless Tobacco Never Used     History reviewed  No pertinent family history  Meds/Allergies   all current active meds have been reviewed, current meds:   Current Facility-Administered Medications   Medication Dose Route Frequency    acetaminophen (TYLENOL) tablet 650 mg  650 mg Oral Q6H PRN    allopurinol (ZYLOPRIM) tablet 100 mg  100 mg Oral Daily    atorvastatin (LIPITOR) tablet 10 mg  10 mg Oral Daily    azithromycin (ZITHROMAX) 500 mg in sodium chloride 0 9% 250mL IVPB 500 mg  500 mg Intravenous Q24H    cefTRIAXone (ROCEPHIN) 2,000 mg in dextrose 5 % 50 mL IVPB  2,000 mg Intravenous Q24H    finasteride (PROSCAR) tablet 5 mg  5 mg Oral Daily    levETIRAcetam (KEPPRA) tablet 500 mg  500 mg Oral Q12H [address]    oxyCODONE (ROXICODONE) IR tablet 2 5 mg  2 5 mg Oral Q4H PRN    oxyCODONE (ROXICODONE) IR tablet 5 mg  5 mg Oral Q4H PRN    [START ON 2020] pantoprazole (PROTONIX) EC tablet 20 mg  20 mg Oral Early Morning    sertraline (ZOLOFT) tablet 50 mg  50 mg Oral Daily    and PTA meds:   Prior to Admission Medications   Prescriptions Last Dose Informant Patient Reported?  Taking?   allopurinol (ZYLOPRIM) 100 mg tablet   Yes No   Sig: Take 100 mg by mouth daily   aspirin (ECOTRIN LOW STRENGTH) 81 mg EC tablet   Yes No   Sig: Take 81 mg by mouth daily   atorvastatin (LIPITOR) 10 mg tablet   Yes No   Sig: Take 10 mg by mouth daily   carvedilol (COREG) 6 25 mg tablet   No No   Sig: TAKE ONE TABLET BY MOUTH TWICE DAILY WITH FOOD   clopidogrel (PLAVIX) 75 mg tablet   Yes No   Sig: Take by mouth daily   esomeprazole (NexIUM) 20 mg capsule   Yes No   Sig: Take 20 mg by mouth every morning before breakfast   finasteride (PROSCAR) 5 mg tablet   Yes No   Sig: Take 5 mg by mouth daily   glipiZIDE (GLUCOTROL) 10 mg tablet   Yes No   Sig: Take by mouth 2 (two) times a day before meals   levETIRAcetam (KEPPRA) 500 mg tablet   Yes No   Sig: Take 500 mg by mouth every 12 (twelve) hours   metFORMIN (GLUCOPHAGE) 500 mg tablet   Yes No   Sig: Take 500 mg by mouth 3 (three) times a day before meals   rivaroxaban (XARELTO) 10 mg tablet   Yes No   Sig: Take 10 mg by mouth daily   sertraline (ZOLOFT) 100 mg tablet   Yes No   Sig: Take 50 mg by mouth daily      Facility-Administered Medications: None     No Known Allergies    Objective   I/O       01/19 0701 - 01/20 0700 01/20 0701 - 01/21 0700 01/21 0701 - 01/22 0700    Urine   350    Total Output   350    Net   -350                 Physical Exam   Constitutional: He appears well-developed and well-nourished  No distress  HENT:   Head: Normocephalic  Nose: Nose normal    Eyes: Pupils are equal, round, and reactive to light  Conjunctivae and EOM are normal  Right eye exhibits no discharge  Left eye exhibits no discharge  No scleral icterus  Neck: Normal range of motion  Neck supple  Cardiovascular: Normal rate  Pulmonary/Chest: Effort normal  No respiratory distress  Abdominal: Soft  He exhibits no distension  There is no tenderness  Musculoskeletal: He exhibits no tenderness  Neurological: He has normal strength  He has a normal Finger-Nose-Finger Test    Reflex Scores:       Patellar reflexes are 1+ on the right side and 1+ on the left side  Skin: Skin is warm and dry  Left shoulder incision CDI with sutures   Psychiatric: He has a normal mood and affect  His behavior is normal  His speech is tangential      Neurologic Exam     Mental Status   Oriented to person  Oriented to place  Disoriented to city  Disoriented to month  Oriented to year  Follows 2 step commands  Attention: normal  Concentration: normal    Level of consciousness: alert ,  arousable by verbal stimuli  Normal comprehension  Cranial Nerves     CN III, IV, VI   Pupils are equal, round, and reactive to light    Extraocular motions are normal  Nystagmus: none   Upgaze: normal  Conjugate gaze: present    CN V   Facial sensation intact  CN VII   Facial expression full, symmetric  CN VIII   Hearing: impaired    CN XI   Right trapezius strength: normal  Left trapezius strength: normal    CN XII   Tongue: not atrophic  Fasciculations: absent  Tongue deviation: none    Motor Exam   Muscle bulk: normal  Overall muscle tone: normal  Right arm pronator drift: absent  Left arm pronator drift: absent    Strength   Strength 5/5 throughout  Sensory Exam   Light touch normal      Gait, Coordination, and Reflexes     Coordination   Finger to nose coordination: normal    Tremor   Resting tremor: absent  Intention tremor: absent  Action tremor: absent    Reflexes   Right patellar: 1+  Left patellar: 1+  Right Galaviz: absent  Left Galaviz: absent  Right ankle clonus: absent  Left ankle clonus: absent        Vitals:Blood pressure 123/63, pulse 60, temperature 98 2 °F (36 8 °C), resp  rate 18, SpO2 95 %  ,There is no height or weight on file to calculate BMI  Lab Results:   Results from last 7 days   Lab Units 01/21/20  0806   WBC Thousand/uL 8 20   HEMOGLOBIN g/dL 11 0*   HEMATOCRIT % 33 9*   PLATELETS Thousands/uL 138*   NEUTROS PCT % 86*   MONOS PCT % 5     Results from last 7 days   Lab Units 01/21/20  0806   POTASSIUM mmol/L 4 4   CHLORIDE mmol/L 106   CO2 mmol/L 24   BUN mg/dL 25   CREATININE mg/dL 1 42*   CALCIUM mg/dL 9 2   ALK PHOS U/L 31*   ALT U/L 7   AST U/L 17             Results from last 7 days   Lab Units 01/21/20  0948   INR  2 10*     No results found for: TROPONINT  ABG:No results found for: PHART, LRC8KGN, PO2ART, CFW3HTD, A4LGNIWA, BEART, SOURCE    Imaging Studies: I have personally reviewed pertinent reports     and I have personally reviewed pertinent films in PACS    Xr Chest Pa & Lateral    Result Date: 1/21/2020  Impression: Question increased opacity in the right midlung and in the right posterior costophrenic angle on the lateral projection  If this is a real finding, it could represent pneumonia  The study was marked in [facility] for immediate notification  Workstation performed: [id number]     Ct Head Wo Contrast    Result Date: 1/21/2020  Impression: Small acute left frontal parafalcine subdural hematoma  No mass effect or shift  Generalized atrophy and left frontal encephalomalacia appearing stable  I personally discussed this study with Ana Warren on 1/21/2020 at 9:12 AM  Workstation performed: [id number]       EKG, Pathology, and Other Studies: I have personally reviewed pertinent reports  VTE Prophylaxis: Sequential compression device (Venodyne)  and Reason for no pharmacologic prophylaxis SDH    Code Status: Level 1 - Full Code  Advance Directive and Living Will:      Power of :    POLST:      Counseling / Coordination of Care  I spent 30 minutes with the patient

## 2020-01-21 NOTE — PLAN OF CARE
Problem: PHYSICAL THERAPY ADULT  Goal: Performs mobility at highest level of function for planned discharge setting  See evaluation for individualized goals  Description  Treatment/Interventions: OT, Spoke to case management, Spoke to nursing, Gait training, Bed mobility, Patient/family training, Endurance training, LE strengthening/ROM, Functional transfer training  Equipment Recommended: (pt reports having RW)       See flowsheet documentation for full assessment, interventions and recommendations  Note:   Prognosis: Good  Problem List: Decreased strength, Decreased endurance, Impaired balance, Decreased mobility, Decreased safety awareness, Pain, Orthopedic restrictions  Assessment: Pt is 78 y o  male seen for PT evaluation s/p admit to [facility] on 1/21/2020 w/ with hypoglycemia & small acute L frontal SDH, pna  PT consulted to assess pt's functional mobility and d/c needs  Order placed for PT eval and tx, w/ up as tolerated order  PTA, pt was ambulates unrestricted distances and all terrain and has 4 MAYDA  Personal factors affecting pt at time of IE include: ambulating w/ assistive device, stairs to enter home, inability to ambulate household distances, limited home support, impulsivity, unable to perform physical activity, inability to perform IADLs and inability to perform ADLs  Please find objective findings from PT assessment regarding body systems outlined above with impairments and limitations including weakness, decreased ROM, impaired balance, decreased endurance, gait deviations, pain, decreased activity tolerance, decreased functional mobility tolerance, decreased safety awareness, fall risk and orthopedic restrictions  Pt required frequent re-direction to task as he is easily distracted  Required increased time to complete all mobility  Noted moderate impulsivity throughout session  Absent knowledge of current limitations and safety place pt at high risk of falls   The following objective measures performed on IE also reveal limitations: Barthel Index: 45/100  Pt's clinical presentation is currently unstable/unpredictable seen in pt's presentation of confusion, step down, SDH  Pt to benefit from continued PT tx to address deficits as defined above and maximize level of functional independent mobility and consistency  From PT/mobility standpoint, recommendation at time of d/c would be IP rehab pending progress in order to facilitate return to PLOF  Recommendation: Post acute IP rehab     PT - OK to Discharge: Yes    See flowsheet documentation for full assessment

## 2020-01-21 NOTE — ASSESSMENT & PLAN NOTE
S/p multiple falls    Imaging:   · CT head wo 1/21/20: Acute left frontal parafalcine subdural hematoma  No mass effect or shift  Plan:   · Continue to monitor neurological exam  GCS 14 with mild confusion  FC  No focal weakness  No drift  · STAT repeat CT head with any neurological decline including drop GCS 2pt in 1 hr  · Hold all anti-platelet and anticoagulation medications  · Patient was on ASA/plavix and Elquis prior to admission for history of CABG/stent/stroke  · Given DDAVP and Kcentra  · Keppra x 1 wk for sz ppx  · Mobilize with physical and occupational therapy  · DVT prophylaxis:  Bilateral SCDs only at this time  Defer pharmacological DVT prophylaxis until stable repeat CT head obtained  · Order repeat CT head to be completed tomorrow  · Will continue to follow  Call with questions/concerns

## 2020-01-21 NOTE — H&P
H&P Exam - Trauma   Enriqueta Rios 78 y o  male MRN: [id number]  Unit/Bed#: [facility] 604-01 Encounter: [id number]        Assessment/Plan   Trauma Alert: Other Transfer from [address] [place] [address] Dr: Ambulance  Trauma Team: Attending [place], Residents Harsh Dominguez and Nehemiah Gunter  Consultants: Neurosurgery via tiger text    Trauma Active Problems:   Acute subdural hematoma    Multiple falls  Pneumonia     Trauma Plan:   Admit to SD2  Fall precautions  HOT protocol  Neurosurgery consult   Neurochecks Q1hr  Repeat CTH tomorrow   Ceftriaxone and azithromycin  Monitor fever curve and WBCs  Hold Xarelto, ASA and Plavix, was reversed with Eual Ochoa and DDAVP   Hold AC/AP     Chief Complaint: "I'm fine"     History of Present Illness   HPI:  Enriqueta Rios is a 78 y o  male who presents as a transfer from [facility] where he was brought by ambulance after wife had difficulty awakening him  On EMS arrival, patient was found to be hypoglycemic and was given D10  Patient awoke and had no complaints  At [facility], patient was febrile and CXR revealed possible pneumonia  Patient also found to have a subdural hematoma and was given K-centra and DDAVP  Patient is a difficult historian and seems confused at this time which is not his baseline  He and his his wife do not recall recent falls  Per the son, patient had told him he fell in the woods about a month ago  Mechanism:Fall    Review of Systems   Constitutional: Negative for chills, diaphoresis, fatigue and fever  HENT: Negative for congestion, rhinorrhea and sore throat  Eyes: Negative for photophobia and visual disturbance  Respiratory: Positive for cough  Negative for chest tightness and shortness of breath  Cardiovascular: Negative for chest pain and palpitations  Gastrointestinal: Negative for abdominal pain, blood in stool, constipation, diarrhea, nausea and vomiting  Genitourinary: Negative for decreased urine volume, dysuria, frequency and hematuria  Musculoskeletal: Negative for back pain, gait problem, myalgias, neck pain and neck stiffness  Skin: Negative for pallor and rash  Neurological: Negative for syncope, weakness, light-headedness, numbness and headaches  Hematological: Negative for adenopathy  Does not bruise/bleed easily  Psychiatric/Behavioral: Positive for confusion  All other systems reviewed and are negative  12-point, complete review of systems was reviewed and negative except as stated above  Historical Information   History is unobtainable from the patient due to confusion  Efforts to obtain history included the following sources: family member, obtained from other records    Past Medical History:   Diagnosis Date    Cardiac disease     [facility] mellitus [facility] [address] [facility]     Hyperlipidemia     Hypertension     Seizures ([facility] [address] [facility]     Skin cancer     Stroke [facility])      Past Surgical History:   Procedure Laterality Date    CARDIAC SURGERY      bypass      Social History   Social History     Substance and Sexual Activity   Alcohol Use Never    Frequency: Never    Binge frequency: Never     Social History     Substance and Sexual Activity   Drug Use Not on file     Social History     Tobacco Use   Smoking Status Former Smoker    Last attempt to quit: [address] Years since quittin 0   Smokeless Tobacco Never Used       There is no immunization history on file for this patient    Last Tetanus: unkown  Family History: Non-contributory      Meds/Allergies   all current active meds have been reviewed    No Known Allergies      PHYSICAL EXAM      Objective   Vitals:   First set: Temperature: 98 °F (36 7 °C) (20 1130)  Pulse: 71 (20 1130)  Respirations: 16 (20 1130)  Blood Pressure: 164/84 (20 1130)    Primary Survey:   (A) Airway: patent and intact   (B) Breathing: bilateral breath sounds   (C) Circulation: Pulses:   normal, pedal  2/4 and radial  2/4  (D) Disabliity:  GCS Total:  15, Eye Opening: Spontaneous = 4, Motor Response: Obeys commands = 6 and Verbal Response:  Oriented = 5  (E) Expose:  Completed    Secondary Survey: (Click on Physical Exam tab above)  Physical Exam   Constitutional: He is oriented to person, place, and time  No distress  Patient alert and oriented to person, place and time, no acute distress    HENT:   Head: Atraumatic  Eyes: Pupils are equal, round, and reactive to light  EOM are normal    Neck: Normal range of motion  Neck supple  Cardiovascular: Normal rate, regular rhythm, normal heart sounds and intact distal pulses  Pulmonary/Chest: Effort normal  No respiratory distress  He has no wheezes  Coarse breath sounds at bases   Abdominal: Soft  Bowel sounds are normal  He exhibits no distension  There is no tenderness  Musculoskeletal: Normal range of motion  He exhibits no tenderness or deformity  Neurological: He is alert and oriented to person, place, and time  Mild right sided facial droop (baseline from prior trauma), CN 2-12 intact, full ROM of upper and lower extremities, muscle strength 5/5 throughout, sensation intact throughout   Skin: Skin is warm and dry  Capillary refill takes less than 2 seconds  No rash noted  He is not diaphoretic  No erythema  No pallor  Psychiatric: He has a normal mood and affect  His behavior is normal    Nursing note and vitals reviewed  Invasive Devices     Peripheral Intravenous Line            Peripheral IV 01/21/20 Left Antecubital less than 1 day    Peripheral IV 01/21/20 Left Hand less than 1 day                Lab Results:   Results: I have personally reviewed pertinent reports      Lab Results   Component Value Date    SODIUM 140 01/21/2020    K 4 4 01/21/2020     01/21/2020    CO2 24 01/21/2020    BUN 25 01/21/2020    CREATININE 1 42 (H) 01/21/2020    CALCIUM 9 2 01/21/2020    AST 17 01/21/2020    ALT 7 01/21/2020    ALKPHOS 31 (L) 01/21/2020    EGFR 46 01/21/2020   , CBC:   Lab Results   Component Value Date    WBC 8 20 01/21/2020    HGB 11 0 (L) 01/21/2020    HCT 33 9 (L) 01/21/2020     (H) 01/21/2020     (L) 01/21/2020    MCH 32 5 01/21/2020    MCHC 32 4 01/21/2020    RDW 18 8 (H) 01/21/2020    MPV 8 8 01/21/2020    and Coagulation:   Lab Results   Component Value Date    INR 2 10 (H) 01/21/2020     Imaging/EKG Studies: Results: I have personally reviewed pertinent reports  and I have personally reviewed pertinent films in PACS     1/21 CTH: Small acute left frontal parafalcine subdural hematoma  No mass effect or shift  Generalized atrophy and left frontal encephalomalacia appearing stable  1/21 CXR: Question increased opacity in the right midlung and in the right posterior costophrenic angle on the lateral projection  If this is a real finding, it could represent pneumonia      Code Status: Level 1 - Full Code  Advance Directive and Living Will:      Power of :    POLST:

## 2020-01-21 NOTE — PLAN OF CARE
Problem: OCCUPATIONAL THERAPY ADULT  Goal: Performs self-care activities at highest level of function for planned discharge setting  See evaluation for individualized goals  Description  Treatment Interventions: ADL retraining, Functional transfer training, Endurance training, Cognitive reorientation, Patient/family training, Equipment evaluation/education, Compensatory technique education, Activityengagement          See flowsheet documentation for full assessment, interventions and recommendations  Note:   Limitation: Decreased ADL status, Decreased Safe judgement during ADL, Decreased cognition, Decreased endurance, Decreased self-care trans, Decreased high-level ADLs  Prognosis: Good, Fair  Assessment: Pt is a 78 y o  male who was admitted to [address] [place] [address] on 1/21/2020 with hypoglycemia - wife unable to arouse pt at home - found to have acute SDH and transferred to hospitals    Pt's problem list also includes PMH of DM, HTN, underlying neurological disorder, cancer history and cardiac disease, hld, seizures, CVA  At baseline pt was completing adls and mobility independently - I iadls  Pt lives with spouse in 1 story home with several MAYDA  Currently pt requires min assist for overall ADLS and min assist for functional mobility/transfers  Pt currently presents with impairments in the following categories -steps to enter environment, difficulty performing ADLS, difficulty performing IADLS , limited insight into deficits and health management  activity tolerance, endurance, standing balance/tolerance, sitting balance/tolerance, memory, insight, safety , judgement , attention  and sequencing    These impairments, as well as pt's fatigue, pain, impulsivity and risk for falls  limit pt's ability to safely engage in all baseline areas of occupation, includingbathing, dressing, toileting, functional mobility/transfers, community mobility, laundry , driving, house maintenance, medication management, meal prep, cleaning, social participation  and leisure activities  From OT standpoint, recommendations dependant upon progress, support available and results of further cognitive assessement  OT will continue to follow to address the below stated goals        OT Discharge Recommendation: Short Term Rehab(pending support and further cognitive assessment )

## 2020-01-21 NOTE — OCCUPATIONAL THERAPY NOTE
[address]  Evaluation     Patient Name: Valente Christian  CWIYF'Y Date: 1/21/2020  Problem List  Active Problems:    Subdural hematoma [facility])    Past Medical History  Past Medical History:   Diagnosis Date    Cardiac disease     [facility] ([facility] )     Hyperlipidemia     Hypertension     Seizures ([facility] )     Skin cancer     Stroke [facility])      Past Surgical History  Past Surgical History:   Procedure Laterality Date    CARDIAC SURGERY      bypass 2012 01/21/20 1440   Note Type   Note type Eval/Treat   Restrictions/Precautions   Weight Bearing Precautions Per Order No   Other Precautions Cognitive; Chair Alarm; Bed Alarm; Fall Risk;Hard of hearing   Pain Assessment   Pain Assessment No/denies pain   Home Living   Type of [address] One level;Stairs to enter with rails   Prior Function   Level of [place] Independent with ADLs and functional mobility   Lives With Spouse   Receives Help From Family   ADL Assistance Independent   IADLs Independent   Falls in the last 6 months   (denies although son reports recent fall while hunting )   Vocational Retired   Lifestyle   Autonomy I adls and mobility - i iadls - shares homemaking with spouse   Reciprocal Relationships supportive family   Service to Others retired   Intrinsic Gratification active pta    Subjective   Subjective "I haven't been up yet"   ADL   Eating Assistance Unable to assess   Eating Deficit NPO   Grooming Assistance 5  Supervision/Setup   UB Bathing Assistance 5  Supervision/Setup   LB [address] 4  Minimal Assistance   [address]  [address]  [address]  4  Minimal Assistance   Bed Mobility   Supine to Sit 4  Minimal assistance   Sit to Supine 4  Minimal assistance   Transfers   Sit to Stand 4  Minimal assistance   Stand to Sit 4  Minimal assistance   Stand pivot 4  Minimal assistance   Functional Mobility   Functional Mobility 4 Minimal assistance   Additional items Rolling walker   Balance   Static Sitting Fair +   Dynamic Sitting Fair   Static Standing Fair -   Dynamic Standing Poor +   Ambulatory Poor +   Activity Tolerance   Activity Tolerance Patient limited by fatigue;Treatment limited secondary to medical complications (Comment)   RUE Assessment   RUE Assessment WFL   LUE Assessment   LUE Assessment WFL   Cognition   Overall Cognitive Status Impaired   Arousal/Participation Arousable; Cooperative   Attention Attends with cues to redirect   Orientation Level Oriented to person;Oriented to place;Oriented to time;Oriented to situation  (general place/time/situation)   Memory Decreased short term memory;Decreased recall of recent events;Decreased recall of precautions   Following Commands Follows one step commands with increased time or repetition   Comments pt noted to be distractable and tangential with inappropriate conversation noted at times (not in context with conversation) - able to redirect for brief periods of time   Assessment   Limitation Decreased ADL status; Decreased Safe judgement during ADL;Decreased cognition;Decreased endurance;Decreased self-care trans;Decreased high-level ADLs   Prognosis Good;Fair   Assessment Pt is a 78 y o  male who was admitted to [address] [place] [address] on 1/21/2020 with hypoglycemia - wife unable to arouse pt at home - found to have acute SDH and transferred to [facility]    Pt's problem list also includes PMH of DM, HTN, underlying neurological disorder, cancer history and cardiac disease, hld, seizures, CVA  At baseline pt was completing adls and mobility independently - I iadls  Pt lives with spouse in 1 story home with several MAYDA  Currently pt requires min assist for overall ADLS and min assist for functional mobility/transfers   Pt currently presents with impairments in the following categories -steps to enter environment, difficulty performing ADLS, difficulty performing IADLS , limited insight into deficits and health management  activity tolerance, endurance, standing balance/tolerance, sitting balance/tolerance, memory, insight, safety , judgement , attention  and sequencing   These impairments, as well as pt's fatigue, pain, impulsivity and risk for falls  limit pt's ability to safely engage in all baseline areas of occupation, includingbathing, dressing, toileting, functional mobility/transfers, community mobility, laundry , driving, house maintenance, medication management, meal prep, cleaning, social participation  and leisure activities  From OT standpoint, recommendations dependant upon progress, support available and results of further cognitive assessement  OT will continue to follow to address the below stated goals  Goals   Patient Goals go home    LTG Time Frame 7-10   Long Term Goal #1 refer to established goals below   Plan   Treatment Interventions ADL retraining;Functional transfer training; Endurance training;Cognitive reorientation;Patient/family training;Equipment evaluation/education; Compensatory technique education; Activityengagement   Goal Expiration Date 01/31/20   OT Frequency 3-5x/wk   Recommendation   OT Discharge Recommendation Short Term Rehab  (pending support and further cognitive assessment )       OCCUPATIONAL THERAPY GOALS:    *Mod I adls after setup with use of AE PRN  *Mod I toileting and clothing management   *Mod I functional mobility and transfers to/from all surfaces with good dynamic balance and safety for participation in dynamic adls and iadl tasks   *Demonstrate good carryover with safe use of RW during functional tasks   *Assess DME needs   *Increase activity tolerance to 35-40 minutes for participation in adls and enjoyable activities  *Pt to participate in ongoing functional cognitive assessment with good attention/participation to assist with safe d/c recommendations      Jeancarlos Pineda, OT

## 2020-01-21 NOTE — PHYSICAL THERAPY NOTE
Physical Therapy Evaluation     Patient's Name: Ross Schwartz    Admitting Diagnosis  Trauma [T14 90XA]  Subdural hematoma ([facility] 75 ) [P88 2D8X]  Fall [W19  XXXA]    Problem List  Patient Active Problem List   Diagnosis    Subdural hematoma [facility])       Past Medical History  Past Medical History:   Diagnosis Date    Cardiac disease     Diabetes mellitus ([facility] )     Hyperlipidemia     Hypertension     Seizures ([facility] )     Skin cancer     Stroke [facility])        Past Surgical History  Past Surgical History:   Procedure Laterality Date    CARDIAC SURGERY      bypass 2012 01/21/20 1441   Note Type   Note type Eval/Treat   Pain Assessment   Pain Assessment No/denies pain   Pain Score No Pain   Home Living   Type of [address] One level;Stairs to enter with rails   Prior Function   Level of [place] Independent with ADLs and functional mobility   Lives With Spouse   Receives Help From Family   ADL Assistance Independent   IADLs Independent   Vocational Retired   Restrictions/Precautions   [facility] Bearing Precautions Per Order No   Other Precautions Cognitive; Chair Alarm; Bed Alarm;Multiple lines; Fall Risk;Pain   Cognition   Orientation Level Oriented to person;Oriented to place   RLE Assessment   RLE Assessment WFL   LLE Assessment   LLE Assessment WFL   Coordination   Movements are Fluid and Coordinated 0   Coordination and Movement Description slow and guarded with fatigue   Bed Mobility   Supine to Sit 4  Minimal assistance   Sit to Supine 4  Minimal assistance   Transfers   Sit to Stand 4  Minimal assistance   Stand to Sit 4  Minimal assistance   Stand pivot 4  Minimal assistance   Ambulation/Elevation   Gait pattern Excessively slow; Shuffling;Decreased foot clearance   Gait Assistance 4  Minimal assist   Additional items Assist x 1   Assistive Device Rolling walker   Distance 60   Balance   Static Sitting Fair +   Dynamic Sitting Fair   Static Standing Fair -   Dynamic Standing Poor +   Ambulatory Poor +   Endurance Deficit   Endurance Deficit Yes   Endurance Deficit Description limited by fatigue   Activity Tolerance   Activity Tolerance Patient limited by fatigue;Patient limited by pain   Medical Staff Made Aware OT for D/C planning   Nurse Made Aware yes, nsg gave clearance to work with pt   Assessment   Prognosis Good   Problem List Decreased strength;Decreased endurance; Impaired balance;Decreased mobility; Decreased safety awareness;Pain;Orthopedic restrictions   Assessment Pt is 78 y o  male seen for PT evaluation s/p admit to [facility] [address] [facility] on 1/21/2020 w/ with hypoglycemia & small acute L frontal SDH, pna  PT consulted to assess pt's functional mobility and d/c needs  Order placed for PT eval and tx, w/ up as tolerated order  PTA, pt was ambulates unrestricted distances and all terrain and has 4 MAYDA  Personal factors affecting pt at time of IE include: ambulating w/ assistive device, stairs to enter home, inability to ambulate household distances, limited home support, impulsivity, unable to perform physical activity, inability to perform IADLs and inability to perform ADLs  Please find objective findings from PT assessment regarding body systems outlined above with impairments and limitations including weakness, decreased ROM, impaired balance, decreased endurance, gait deviations, pain, decreased activity tolerance, decreased functional mobility tolerance, decreased safety awareness, fall risk and orthopedic restrictions  Pt required frequent re-direction to task as he is easily distracted  Required increased time to complete all mobility  Noted moderate impulsivity throughout session  Absent knowledge of current limitations and safety place pt at high risk of falls  The following objective measures performed on IE also reveal limitations: Barthel Index: 45/100  Pt's clinical presentation is currently unstable/unpredictable seen in pt's presentation of confusion, step down, SDH   Pt to benefit from continued PT tx to address deficits as defined above and maximize level of functional independent mobility and consistency  From PT/mobility standpoint, recommendation at time of d/c would be IP rehab pending progress in order to facilitate return to PLOF  Goals   Patient Goals To go home   STG Expiration Date 02/02/20   Short Term Goal #1 1  Complete bed mobility and transfers I to decrease need for caregiver in home  2  Ambulate 300' I to complete household and community mobility without A  3  Improve dynamic balance to good to decrease need for UE support during ambulation  4  Be educated & demonstate 4 steps to be able to enter home without A  Plan   Treatment/Interventions OT; Spoke to case management;Spoke to nursing;Gait training;Bed mobility; Patient/family training; Endurance training;LE strengthening/ROM; Functional transfer training   PT Frequency Other (Comment)  (3-5x/wk)   Recommendation   Recommendation Post acute IP rehab   Equipment Recommended   (pt reports having RW)   PT - OK to Discharge Yes   Barthel Index   Feeding 0   Bathing 0   Grooming Score 5   Dressing Score 5   Bladder Score 10   Bowels Score 10   Toilet Use Score 5   Transfers (Bed/Chair) Score 10   Mobility (Level Surface) Score 0   Stairs Score 0   Barthel Index Score 45           Cary Wyatt, PT

## 2020-01-22 ENCOUNTER — APPOINTMENT (INPATIENT)
Dept: RADIOLOGY | Facility: HOSPITAL | Age: 80
DRG: 813 | End: 2020-01-22
Payer: COMMERCIAL

## 2020-01-22 ENCOUNTER — TELEPHONE (OUTPATIENT)
Dept: NEUROSURGERY | Facility: CLINIC | Age: 80
End: 2020-01-22

## 2020-01-22 PROBLEM — G93.40 ENCEPHALOPATHY: Status: ACTIVE | Noted: 2020-01-22

## 2020-01-22 PROBLEM — E16.2 HYPOGLYCEMIA: Status: ACTIVE | Noted: 2020-01-22

## 2020-01-22 PROBLEM — J18.9 PNEUMONIA INVOLVING RIGHT LUNG: Status: ACTIVE | Noted: 2020-01-22

## 2020-01-22 LAB
ANION GAP SERPL CALCULATED.3IONS-SCNC: 6 MMOL/L (ref 4–13)
ATRIAL RATE: 75 BPM
BUN SERPL-MCNC: 27 MG/DL (ref 5–25)
CALCIUM SERPL-MCNC: 8.9 MG/DL (ref 8.3–10.1)
CHLORIDE SERPL-SCNC: 113 MMOL/L (ref 100–108)
CO2 SERPL-SCNC: 25 MMOL/L (ref 21–32)
CREAT SERPL-MCNC: 1.3 MG/DL (ref 0.6–1.3)
ERYTHROCYTE [DISTWIDTH] IN BLOOD BY AUTOMATED COUNT: 17.3 % (ref 11.6–15.1)
EST. AVERAGE GLUCOSE BLD GHB EST-MCNC: 111 MG/DL
GFR SERPL CREATININE-BSD FRML MDRD: 51 ML/MIN/1.73SQ M
GLUCOSE SERPL-MCNC: 104 MG/DL (ref 65–140)
GLUCOSE SERPL-MCNC: 136 MG/DL (ref 65–140)
GLUCOSE SERPL-MCNC: 140 MG/DL (ref 65–140)
GLUCOSE SERPL-MCNC: 169 MG/DL (ref 65–140)
GLUCOSE SERPL-MCNC: 202 MG/DL (ref 65–140)
GLUCOSE SERPL-MCNC: 224 MG/DL (ref 65–140)
GLUCOSE SERPL-MCNC: 284 MG/DL (ref 65–140)
GLUCOSE SERPL-MCNC: 325 MG/DL (ref 65–140)
GLUCOSE SERPL-MCNC: 55 MG/DL (ref 65–140)
GLUCOSE SERPL-MCNC: 84 MG/DL (ref 65–140)
GLUCOSE SERPL-MCNC: 89 MG/DL (ref 65–140)
HBA1C MFR BLD: 5.5 % (ref 4.2–6.3)
HCT VFR BLD AUTO: 31.1 % (ref 36.5–49.3)
HGB BLD-MCNC: 10 G/DL (ref 12–17)
INR PPP: 1.21 (ref 0.84–1.19)
MAGNESIUM SERPL-MCNC: 1.3 MG/DL (ref 1.6–2.6)
MCH RBC QN AUTO: 31.9 PG (ref 26.8–34.3)
MCHC RBC AUTO-ENTMCNC: 32.2 G/DL (ref 31.4–37.4)
MCV RBC AUTO: 99 FL (ref 82–98)
PHOSPHATE SERPL-MCNC: 3.2 MG/DL (ref 2.3–4.1)
PLATELET # BLD AUTO: 130 THOUSANDS/UL (ref 149–390)
PMV BLD AUTO: 11.3 FL (ref 8.9–12.7)
POTASSIUM SERPL-SCNC: 3.7 MMOL/L (ref 3.5–5.3)
PROTHROMBIN TIME: 14.9 SECONDS (ref 11.6–14.5)
QRS AXIS: 159 DEGREES
QRSD INTERVAL: 140 MS
QT INTERVAL: 444 MS
QTC INTERVAL: 482 MS
RBC # BLD AUTO: 3.13 MILLION/UL (ref 3.88–5.62)
SODIUM SERPL-SCNC: 144 MMOL/L (ref 136–145)
T WAVE AXIS: -11 DEGREES
VENTRICULAR RATE: 71 BPM
WBC # BLD AUTO: 6.83 THOUSAND/UL (ref 4.31–10.16)

## 2020-01-22 PROCEDURE — 85610 PROTHROMBIN TIME: CPT | Performed by: PHYSICIAN ASSISTANT

## 2020-01-22 PROCEDURE — 99232 SBSQ HOSP IP/OBS MODERATE 35: CPT | Performed by: SURGERY

## 2020-01-22 PROCEDURE — 97116 GAIT TRAINING THERAPY: CPT

## 2020-01-22 PROCEDURE — 84100 ASSAY OF PHOSPHORUS: CPT | Performed by: PHYSICIAN ASSISTANT

## 2020-01-22 PROCEDURE — 83036 HEMOGLOBIN GLYCOSYLATED A1C: CPT | Performed by: PHYSICIAN ASSISTANT

## 2020-01-22 PROCEDURE — 82948 REAGENT STRIP/BLOOD GLUCOSE: CPT

## 2020-01-22 PROCEDURE — 85027 COMPLETE CBC AUTOMATED: CPT | Performed by: PHYSICIAN ASSISTANT

## 2020-01-22 PROCEDURE — 99222 1ST HOSP IP/OBS MODERATE 55: CPT | Performed by: INTERNAL MEDICINE

## 2020-01-22 PROCEDURE — 93010 ELECTROCARDIOGRAM REPORT: CPT | Performed by: INTERNAL MEDICINE

## 2020-01-22 PROCEDURE — 99233 SBSQ HOSP IP/OBS HIGH 50: CPT | Performed by: PHYSICIAN ASSISTANT

## 2020-01-22 PROCEDURE — 97110 THERAPEUTIC EXERCISES: CPT

## 2020-01-22 PROCEDURE — 83735 ASSAY OF MAGNESIUM: CPT | Performed by: PHYSICIAN ASSISTANT

## 2020-01-22 PROCEDURE — 80048 BASIC METABOLIC PNL TOTAL CA: CPT | Performed by: PHYSICIAN ASSISTANT

## 2020-01-22 PROCEDURE — 70450 CT HEAD/BRAIN W/O DYE: CPT

## 2020-01-22 RX ORDER — DEXTROSE MONOHYDRATE 25 G/50ML
25 INJECTION, SOLUTION INTRAVENOUS AS NEEDED
Status: DISCONTINUED | OUTPATIENT
Start: 2020-01-22 | End: 2020-01-27 | Stop reason: HOSPADM

## 2020-01-22 RX ORDER — INSULIN GLARGINE 100 [IU]/ML
14 INJECTION, SOLUTION SUBCUTANEOUS
Status: DISCONTINUED | OUTPATIENT
Start: 2020-01-22 | End: 2020-01-24

## 2020-01-22 RX ADMIN — INSULIN LISPRO 2 UNITS: 100 INJECTION, SOLUTION INTRAVENOUS; SUBCUTANEOUS at 22:10

## 2020-01-22 RX ADMIN — LEVETIRACETAM 500 MG: 500 TABLET, FILM COATED ORAL at 22:08

## 2020-01-22 RX ADMIN — AZITHROMYCIN MONOHYDRATE 500 MG: 500 INJECTION, POWDER, LYOPHILIZED, FOR SOLUTION INTRAVENOUS at 22:11

## 2020-01-22 RX ADMIN — INSULIN LISPRO 5 UNITS: 100 INJECTION, SOLUTION INTRAVENOUS; SUBCUTANEOUS at 18:37

## 2020-01-22 RX ADMIN — CEFTRIAXONE 2000 MG: 2 INJECTION, POWDER, FOR SOLUTION INTRAMUSCULAR; INTRAVENOUS at 16:16

## 2020-01-22 RX ADMIN — ATORVASTATIN CALCIUM 10 MG: 10 TABLET, FILM COATED ORAL at 08:13

## 2020-01-22 RX ADMIN — FINASTERIDE 5 MG: 5 TABLET, FILM COATED ORAL at 08:13

## 2020-01-22 RX ADMIN — INSULIN GLARGINE 14 UNITS: 100 INJECTION, SOLUTION SUBCUTANEOUS at 22:08

## 2020-01-22 RX ADMIN — SERTRALINE HYDROCHLORIDE 50 MG: 50 TABLET ORAL at 08:13

## 2020-01-22 RX ADMIN — ALLOPURINOL 100 MG: 100 TABLET ORAL at 08:13

## 2020-01-22 RX ADMIN — PANTOPRAZOLE SODIUM 20 MG: 20 TABLET, DELAYED RELEASE ORAL at 05:56

## 2020-01-22 RX ADMIN — LEVETIRACETAM 500 MG: 500 TABLET, FILM COATED ORAL at 08:13

## 2020-01-22 NOTE — PHYSICAL THERAPY NOTE
PHYSICAL THERAPY NOTE          Patient Name: Genesis Garcia  ZTNVR'G Date: 1/22/2020 01/22/20 1017   Pain Assessment   Pain Assessment No/denies pain   Restrictions/Precautions   Weight Bearing Precautions Per Order No   Other Precautions Cognitive; Chair Alarm; Bed Alarm;Multiple lines; Fall Risk  (chair alarm on post session )   General   Chart Reviewed Yes   Response to Previous Treatment Patient with no complaints from previous session  Family/Caregiver Present No   Cognition   Overall Cognitive Status Impaired   Arousal/Participation Responsive   Attention Attends with cues to redirect   Orientation Level Oriented to person   Memory Decreased short term memory;Decreased recall of recent events;Decreased recall of precautions   Following Commands Follows one step commands with increased time or repetition   Subjective   Subjective " I want to get up and walk"   Bed Mobility   Supine to Sit 4  Minimal assistance   Additional items Assist x 1; Increased time required;Verbal cues   Transfers   Sit to Stand 4  Minimal assistance   Additional items Assist x 1; Increased time required;Verbal cues; Impulsive   Stand to Sit 4  Minimal assistance   Additional items Assist x 1; Increased time required;Verbal cues; Impulsive   Additional Comments VC and TC needed for safety    Ambulation/Elevation   Gait pattern Excessively slow; Short stride; Inconsistent martha   Gait Assistance 4  Minimal assist   Additional items Assist x 1   Assistive Device Rolling walker   Distance 70ftx2, 50ftx2   Balance   Static Sitting Fair +   Static Standing Fair -   Ambulatory Poor +   Endurance Deficit   Endurance Deficit Yes   Endurance Deficit Description fatigue, cog status    Activity Tolerance   Activity Tolerance Patient limited by fatigue; Other (Comment)  (cog status)   Nurse Made Aware Pt appropriate to be seen and mobilize per nsg    Exercises   Hip Abduction Sitting;15 reps;AROM; Bilateral  (x 2 sets )   Knee AROM Long Arc Quad Sitting;15 reps;AROM; Bilateral  (x 2 sets )   Ankle Pumps Sitting;15 reps;AROM; Bilateral  (x 2 sets )   Marching Sitting;15 reps;AROM; Bilateral  (x 2 sets )   Assessment   Prognosis Good   Problem List Decreased strength;Decreased endurance; Impaired balance;Decreased mobility; Decreased safety awareness;Decreased cognition; Impaired judgement   Assessment Pt resting in bed at time of PT treatment session  Pt reports " I want to walk" and is willing to participate in PT treatment session  Pt able to perform all bed mobility with min A x 1 and all transfers with min A x 1 which is approximately the same level of assist as compared to PT eval  Pt continues to be impulsive with all mobility and nees constant cues for safety and to redirect attention  Pt tolerated increased ambulation distances this session with min A x 1 and the use of a RW  No gross LOB noted, although constant cues needed for safety and for RW management  Pt remained tangential throughout entire session and needs frequent cues  Pt able to tolerate and perform all therex seated OOB in chair this session without any increase in complaints  Cues required for proper form and pacing with therex  Pt assisted back into chair at conclusion of PT session with all needs within reach and chair alarm on  PT will continue to follow  D/C recommendation when medically cleared is rehab due to decreased functional mobility compared to baseline and increased a needed from caregiver at current time  Barriers to Discharge Decreased caregiver support   Goals   Patient Goals " to go home"   STG Expiration Date 02/02/20   PT Treatment Day 1   Plan   Treatment/Interventions Functional transfer training;LE strengthening/ROM; Elevations; Therapeutic exercise; Endurance training;Patient/family training;Bed mobility; Equipment eval/education;Gait training;Spoke to nursing   Progress Progressing toward goals   PT Frequency Other (Comment)  (3-5x a week )   Recommendation   Recommendation Post acute IP rehab   Equipment Recommended Walker  (RW)   PT - OK to Discharge Yes  (to rehab when medically cleared)   Paris Gonzalez, PT

## 2020-01-22 NOTE — PROGRESS NOTES
Notified that pt hypoglycemic with glucose 28 on evening check and reportedly more confused than usual  Administered 1 amp D50 and improved to 137  Pt has been NPO since admission for neurosurgery eval  Neurosurgery recommended no surgical intervention at this time, so will order regular diet  Pt was also notably hypoglycemic at the time of admission  No documented hx insulin or sulfonylurea use  Will continue to monitor glucose Q1H until stable, if goes low again will consider starting D10 infusion

## 2020-01-22 NOTE — PROGRESS NOTES
Progress Note - Delilah Flor 1940, 78 y o  male MRN: [id number]    Unit/Bed#: [facility] 604-01 Encounter: [id number]    Primary Care Provider: Khoa Andrews DO   Date and time admitted to hospital: 1/21/2020 11:26 AM        Subdural hematoma [facility])  Assessment & Plan  S/p multiple falls    Imaging:   · CT head wo 1/21/20: Acute left frontal parafalcine subdural hematoma  No mass effect or shift  · CT head w/o 1/22/20: Stable appearing left frontal parafalcine SDH  Final read pending  Plan:   · Continue to monitor neurological exam  GCS 14 with mild confusion  FC  No focal weakness  No drift  · STAT repeat CT head with any neurological decline including drop GCS 2pt in 1 hr  · Hold all anti-platelet and anticoagulation medications for at least 2 weeks  · Patient was on ASA/plavix and Elquis prior to admission for history of CABG/stent/stroke  · Given DDAVP and Kcentra  · Keppra x 1 wk for sz ppx  · Mobilize with physical and occupational therapy  · DVT prophylaxis:  Bilateral SCDs only at this time  OK to begin pharm ppx given stable CTH  Neurosurgery will sign off  Patient will follow up as an outpatient in 2 weeks with a repeat CT head  Please don't hesitate to contact us with any additional questions or concerns  Subjective/Objective   Chief Complaint: None    Subjective: Patient eating lunch  Denies headache, dizziness, confusion, weakness    Objective: Patient sitting in bed in NAD  Alert and oriented, but difficult to keep conversation on track  Concentration decreased       Intake/Output       01/22/20 0701 - 01/23/20 0700      [id number] Total       Intake    P O   360  -- 360    Total Intake 360 -- 360       Output    Urine  --  -- --    Unmeasured Urine Occurrence 1 x -- 1 x    Stool  --  -- --    Unmeasured Stool Occurrence 1 x -- 1 x    Total Output -- -- --       Net I/O     360 -- 360          Invasive Devices     Peripheral Intravenous Line            Peripheral IV 01/21/20 Left Antecubital 1 day    Peripheral IV 01/21/20 Left Hand 1 day                Vitals: Blood pressure 136/69, pulse 70, temperature 97 9 °F (36 6 °C), temperature source Oral, resp  rate 20, height 5' 8" (1 727 m), weight 95 3 kg (210 lb 1 6 oz), SpO2 94 %  ,Body mass index is 31 95 kg/m²  General appearance: alert, appears stated age, cooperative and no distress  Head: Normocephalic, without obvious abnormality, atraumatic  Eyes: EOMI, PERRL  Neck: supple, symmetrical, trachea midline and NT  Back: no kyphosis present, no tenderness to percussion or palpation  Lungs: non labored breathing  Heart: regular heart rate  Neurologic:   Mental status: Alert, oriented x3, thought content appropriate, concentration decreased, attention decreased  Cranial nerves: grossly intact (Cranial nerves II-XII)  Sensory: normal to LT bilaterally  Motor: moving all extremities without focal weakness, 5/5 bilaterally  Reflexes: 2+ and symmetric, no hoffmans or clonus  Coordination: finger to nose normal bilaterally, no drift bilaterally    Lab Results: I have personally reviewed pertinent results      Results from last 7 days   Lab Units 01/22/20  0448 01/21/20  0806   WBC Thousand/uL 6 83 8 20   HEMOGLOBIN g/dL 10 0* 11 0*   HEMATOCRIT % 31 1* 33 9*   PLATELETS Thousands/uL 130* 138*   NEUTROS PCT %  --  86*   MONOS PCT %  --  5     Results from last 7 days   Lab Units 01/22/20  0448 01/21/20  2153 01/21/20  0806   POTASSIUM mmol/L 3 7 3 5 4 4   CHLORIDE mmol/L 113* 111* 106   CO2 mmol/L 25 25 24   BUN mg/dL 27* 24 25   CREATININE mg/dL 1 30 1 27 1 42*   CALCIUM mg/dL 8 9 8 9 9 2   ALK PHOS U/L  --   --  31*   ALT U/L  --   --  7   AST U/L  --   --  17     Results from last 7 days   Lab Units 01/22/20  0448   MAGNESIUM mg/dL 1 3*     Results from last 7 days   Lab Units 01/22/20  0448   PHOSPHORUS mg/dL 3 2     Results from last 7 days   Lab Units 01/22/20  0448 01/21/20  0948   INR  1 21* 2 10*     No results found for: [address]   ABG:No results found for: PHART, GOU3UHU, PO2ART, KSV2SNJ, P4IVGUPK, BEART, SOURCE    Imaging Studies: I have personally reviewed pertinent reports  and I have personally reviewed pertinent films in PACS     Xr Chest Pa & Lateral    Result Date: 1/21/2020  Narrative: CHEST INDICATION:   fever COMPARISON:  Chest radiograph from 10/10/2018 and 10/11/2013  EXAM PERFORMED/VIEWS:  XR CHEST AP & LATERAL  FINDINGS: Mild cardiomegaly  CABG  Right subclavian pacemaker leads in the right atrial appendage, right ventricular apex, and coronary vein  Question increased opacity in the right mid lung and right posterior costophrenic angle on the lateral projection  No effusion or pneumothorax  Osseous structures are within normal limits for age  Impression: Question increased opacity in the right midlung and in the right posterior costophrenic angle on the lateral projection  If this is a real finding, it could represent pneumonia  The study was marked in [facility] for immediate notification  Workstation performed: [id number]     Ct Head Wo Contrast    Result Date: 1/21/2020  Narrative: CT BRAIN - WITHOUT CONTRAST INDICATION:   frequent falls, on aspirin  COMPARISON:  9/10/2018 TECHNIQUE:  CT examination of the brain was performed  In addition to axial images, coronal 2D reformatted images were created and submitted for interpretation  Radiation dose length product (DLP) for this visit:  770 9 mGy-cm   This examination, like all CT scans performed in the [facility], was performed utilizing techniques to minimize radiation dose exposure, including the use of iterative reconstruction and automated exposure control  IMAGE QUALITY:  Diagnostic   FINDINGS: PARENCHYMA: Decreased attenuation is noted in periventricular and subcortical white matter demonstrating an appearance that is statistically most likely to represent mild microangiopathic change; this appearance is similar when compared to most recent prior examination  Prior left frontotemporal chronic CVA without change  Acute extra-axial, subdural hematoma seen along the left frontal falx with a maximum thickness of 11 mm  No CT signs of acute infarction  No intracranial mass, mass effect or midline shift  VENTRICLES AND EXTRA-AXIAL SPACES:  Generalized moderate atrophy appearing stable  Ventricles remain symmetric and within expected limits for degree of sulcation  VISUALIZED ORBITS AND PARANASAL SINUSES:  Unremarkable  CALVARIUM AND EXTRACRANIAL SOFT TISSUES:  Normal      Impression: Small acute left frontal parafalcine subdural hematoma  No mass effect or shift  Generalized atrophy and left frontal encephalomalacia appearing stable  I personally discussed this study with Teagan Abdul on 1/21/2020 at 9:12 AM  Workstation performed: [id number]     EKG, Pathology, and Other Studies: I have personally reviewed pertinent reports        VTE Pharmacologic Prophylaxis: Reason for no pharmacologic prophylaxis OK to start pharm ppx given stable CTH    VTE Mechanical Prophylaxis: sequential compression device

## 2020-01-22 NOTE — ASSESSMENT & PLAN NOTE
- Right-sided pneumonia, present on admission   - Continue current antibiotic regimen with plan to complete 5 day course of antibiotics  - Monitor respiratory status  - Encourage incentive spirometer use and adequate pulmonary hygiene  - Supplemental oxygen as needed only to maintain saturations greater than equal to 94%

## 2020-01-22 NOTE — SOCIAL WORK
Cm reviewed role with pt  Pt was alert and oriented during the conversation  Pt reported his wife, Jessica Dominguez as his emergency contact   Pt reported that he lives in a mobile home with 2 steps with railings to enter from the front and 4 steps with railings to enter from the back  Pt reported that he was IPTA with ADLS  Pt denied inpatient PT/OT, inpatient MH, and substance abuse treatment  Pt confirmed [place] [address] services 7 years ago, but does not recall the name of the agency  Pt reported [facility] in [facility] as his pharmacy of choice  Pt refused to give the name of his PCP, pt reported that he will be switching soon  Pt reported that he was a RW, BSC and shower chair in the home  Pt reported that he is retired and no longer drives himself  CM reviewed d/c planning process including the following: identifying help at home, patient preference for d/c planning needs, Discharge Lounge, [facility] Meds to Bed program, availability of treatment team to discuss questions or concerns patient and/or family may have regarding understanding medications and recognizing signs and symptoms once discharged  CM also encouraged patient to follow up with all recommended appointments after discharge  Patient advised of importance for patient and family to participate in managing patients medical well being     ~ I have read and agree with the above documentation    EMILY Fuentes

## 2020-01-22 NOTE — UTILIZATION REVIEW
Notification of Inpatient Admission/Inpatient Authorization Request   This is a Notification of Inpatient Admission for [address]  Be advised that this patient was admitted to our facility under Inpatient Status  Contact Nubia Alcaraz at [phone] for additional admission information  [facility] DE[facility]  DEDICATED [phone]  Patient Name:   Venkat Stevens   YOB: 1940       [address]   [address]:   [place]  Tax ID: [id number]  NPI: [id number] Attending Provider/NPI: Emanuel Polanco Do [[id number]]   300 [facility]  Phone 1: [phone]  Phone 2:   Fax: [phone]   Place of Service Code: 24     [facility]   Start Date: 1/21/20 1215     Discharge Date & Time: No discharge date for patient encounter  Type of Admission: Inpatient Status Discharge Disposition (if discharged): [address] [place] [address]   Patient Diagnoses: Trauma [T14 90XA]  Subdural hematoma ([address] ) [W29 1Y6G]  Fall [W19  XXXA]     Orders: Admission Orders (From admission, onward)     Ordered        01/21/20 1222  Inpatient Admission  Once                    Assigned Utilization Review Contact: Rosa Hendrickson Utilization Review Department  Phone: [phone]; Fax [phone]  Email: [email or web address]   ATTENTION PAYERS: Please call the assigned Utilization  directly with any questions or concerns ALL voicemails in the department are confidential  Send all requests for admission clinical reviews, approved or denied determinations and any other requests to dedicated fax number belonging to the campus where the patient is receiving treatment

## 2020-01-22 NOTE — TELEPHONE ENCOUNTER
02/05/2020-CALLED PT FOR "NO SHOW" APT, SPOKE TO WIFE CLOTILDE, SCHEDULED 02/11/2020 CT HEAD AND RESCHEDULED F/U APT FOR 02/13/2020  CLOTILDE REQUESTED ONLY Tuesday AND Thursday APT'S BECAUSE OF TRANSPORTATION  02/04/2020-[facility]ED [address], SPOKE TO Sebastien Lomax [address], [address] ADVISED PT WAS DISCHARGED ON 01/29/2020 TO HOME  CALLED PT, LEFT MESSAGE ON MACHINE CONFIRMING 02/05/2020 APT AND TO SCHEDULE CT PRIOR  LEFT MESSAGE STATING IF CT NOT COMPLETED BY TOMORROWS APT, THEN WE WILL BE HAPPY TO RESCHEDULE FOR AFTER CT APT  02/03/2020-CALLED [facility] TO SCHEDULE CT, BUT NO ANSWER     01/28/2020-HENRIK CALLED BACK FROM [facility], CONFIRMED 02/05/2020 APT AND THEY WILL SCHEDULE CT PRIOR TO APT  FAXED CT SCRIPT TO FACILITY TO T#[phone]  WAITING FOR CT TO BE SCHEDULED         01/28/2020-CALLED [address] AND LEFT MESSAGE ON MACHINE CONFIRMING 02/05 APT AND CALL BACK TO SCHEDULE CT         01/27/2020-PT STILL IN HOSPITAL    01/24/2020-PT STILL IN HOSPITAL    01/23/2020-PT STILL IN HOSPITAL    01/22/2020-PT STILL IN HOSPITAL  02/05/2020 APT W/CT HEAD      ----- Message from Tiffany Blanco PA-C sent at 1/22/2020  2:08 PM EST -----  Can you please schedule 2 week hospital follow up with CT head? Thanks!

## 2020-01-22 NOTE — ASSESSMENT & PLAN NOTE
- Acute encephalopathy likely multifactorial in setting of TBI, hypoglycemia, and pneumonia  - Delirium precautions  - Await Geriatric Medicine evaluation and recommendations  - Continue treatment/management of associated medical/trauma conditions

## 2020-01-22 NOTE — UTILIZATION REVIEW
Initial Clinical Review    Admission: Date/Time/Statement: Inpatient Admission Orders (From admission, onward)     Ordered        01/21/20 1222  Inpatient Admission  Once                   Orders Placed This Encounter   Procedures    Inpatient Admission     Standing Status:   Standing     Number of Occurrences:   1     Order Specific Question:   Admitting Physician     Answer:   Alexandre Silveira     Order Specific Question:   Level of Care     Answer:   Level 2 Stepdown / HOT [14]     Order Specific Question:   Estimated length of stay     Answer:   More than 2 Midnights     Order Specific Question:   Certification     Answer:   I certify that inpatient services are medically necessary for this patient for a duration of greater than two midnights  See H&P and MD Progress Notes for additional information about the patient's course of treatment  ED Arrival Information     Expected Arrival Acuity Means of Arrival Escorted By Service Admission Type    - 1/21/2020 11:25 Immediate Ambulance [place] [facility] Trauma Emergency    Arrival Complaint    - Trauma:  fall        Chief Complaint   Patient presents with    Trauma     [facility] transfer from [facility] campus  Pt had hypoglycemic episode, fall  +headstrike, +thinners denies LOC     Assessment/Plan: 78year old male, presented to the ED @ [address] [place] [address], Transferred to the ED @ [facility] via EMS, higher level of care  Admitted as Inpatient due to TRAUMA > Fall > SDH  Presents as a transfer from [facility] where he was brought by ambulance after wife had difficulty awakening him  On EMS arrival, patient was found to be hypoglycemic and was given D10  Patient awoke and had no complaints  At [facility], patient was febrile and CXR revealed possible pneumonia  Patient also found to have a subdural hematoma and was given K-centra and DDAVP  Patient is a difficult historian and seems confused at this time which is not his baseline   He and his his wife do not recall recent falls  Per the son, patient had told him he fell in the woods about a month ago  Fall precautions  Consult Neurosurgery  Neuro checks q1h, repeat CTH tomorrow  IV abx  Monitor fever curve and WBCs  Hold Xarelto, ASA and Plavix, was reversed with Kcentra and DDAVP  Hold AC/AP       01/21/2020  Consult Neurosurgery:  Subdural hematoma:   Continue to monitor neurological exam  GCS 14 with mild confusion  FC  No focal weakness  No drift  STAT repeat CT head with any neurological decline including drop GCS 2pt in 1 hr   Hold all anti-platelet and anticoagulation medications  Patient was on ASA/plavix and Elquis prior to admission for history of CABG/stent/stroke  Given DDAVP and Kcentra  Keppra x 1 wk for sz ppx  Mobilize with physical and occupational therapy  DVT prophylaxis:  Bilateral SCDs only at this time  Defer pharmacological DVT prophylaxis until stable repeat CT head obtained  Order repeat CT head to be completed tomorrow      ED Triage Vitals   Temperature Pulse Respirations Blood Pressure SpO2   01/21/20 1130 01/21/20 1130 01/21/20 1130 01/21/20 1130 01/21/20 1130   98 °F (36 7 °C) 71 16 164/84 97 %      Temp Source Heart Rate Source Patient Position - Orthostatic VS BP Location FiO2 (%)   01/21/20 1130 -- 01/22/20 1054 01/22/20 1054 --   Oral  Lying Right arm       Pain Score       01/21/20 1441       No Pain        Wt Readings from Last 1 Encounters:   01/22/20 95 3 kg (210 lb 1 6 oz)     Additional Vital Signs:   Date/Time  Temp  Pulse  Resp  BP  MAP (mmHg)  SpO2  O2 Device  Patient Position - Orthostatic VS   01/22/20 10:54:09  97 9 °F (36 6 °C)  70  20  136/69  91  94 %  None (Room air)  Lying   01/22/20 07:28:47  97 7 °F (36 5 °C)  62    127/67  87  96 %       01/22/20 07:28:06  97 7 °F (36 5 °C)  67    127/67  87  94 %       01/22/20 02:40:43  98 2 °F (36 8 °C)  64  20  125/65  85  95 %       01/21/20 23:37:16  98 2 °F (36 8 °C)  60  19  121/63  82  98 %       01/21/20 19:34:33 97 7 °F (36 5 °C)  61  18  153/70  98  98 %       20 13:38:28  98 2 °F (36 8 °C)  60  18  123/63  83  95 %       20 1300    60  16  139/66    93 %       20 1200    64  16  127/74    94 %         Date and Time Eye Opening Best Verbal Response Best Motor Response Ennis Coma Scale Score   20 1400 4 4 6 14   20 1300 4 4 6 14   20 1200 4 4 6 14   20 1057 4 4 6 14   20 1000 4 4 6 14   20 0900 4 4 6 14   20 0805 4 4 6 14   20 0700 4 5 6 15   20 0600 4 5 6 15   20 0500 4 5 6 15   20 0400 4 5 6 15   20 0300 4 5 6 15   20 0200 4 5 6 15   20 0100 4 5 6 15   20 0000 4 5 6 15   20 2235 4 5 6 15   20 2200 4 5 6 15   20 2100 4 4 6 14   20 2013 4 4 6 14   20 1845 4 4 6 14   20 1801 4 4 6 14   20 1700 4 4 6 14   20 1559 4 4 6 14   20 1500 4 4 6 14   20 1345 4 4 6 14   20 1130 4 4 6 14     2020 @ 1431  CT head:  Pending official reading    2020 @ 0906  CT head:  Small acute left frontal parafalcine subdural hematoma   No mass effect or shift  Generalized atrophy and left frontal encephalomalacia appearing stable      2020 @ 0928  Chest X:  Question increased opacity in the right midlung and in the right posterior costophrenic angle on the lateral projection   If this is a real finding, it could represent pneumonia     2020 @ 0752  EC, Ventricular-paced rhythm    Pertinent Labs/Diagnostic Test Results:   Results from last 7 days   Lab Units 20  0448 20  0806   WBC Thousand/uL 6 83 8 20   HEMOGLOBIN g/dL 10 0* 11 0*   HEMATOCRIT % 31 1* 33 9*   PLATELETS Thousands/uL 130* 138*   NEUTROS ABS Thousands/µL  --  7 10*     Results from last 7 days   Lab Units 20  0448 20  2153 20  0806   SODIUM mmol/L 144 142 140   POTASSIUM mmol/L 3 7 3 5 4 4   CHLORIDE mmol/L 113* 111* 106   CO2 mmol/L 25 25 24 ANION GAP mmol/L 6 6 10   BUN mg/dL 27* 24 25   CREATININE mg/dL 1 30 1 27 1 42*   EGFR ml/min/1 73sq m 51 53 46   CALCIUM mg/dL 8 9 8 9 9 2   MAGNESIUM mg/dL 1 3*  --   --    PHOSPHORUS mg/dL 3 2  --   --      Results from last 7 days   Lab Units 01/21/20  0806   AST U/L 17   ALT U/L 7   ALK PHOS U/L 31*   TOTAL PROTEIN g/dL 7 2   ALBUMIN g/dL 4 4   TOTAL BILIRUBIN mg/dL 0 80     Results from last 7 days   Lab Units 01/22/20  0954 01/22/20  0558 01/22/20  0445 01/22/20  0204 01/22/20  0103 01/21/20  2336 01/21/20  2212 01/21/20  2154 01/21/20  2148 01/21/20  0750   POC GLUCOSE mg/dl 202* 169* 84 136 140 129 137 28* 49* 104     Results from last 7 days   Lab Units 01/22/20  0448 01/21/20  2153 01/21/20  0806   GLUCOSE RANDOM mg/dL 89 37* 119*     Results from last 7 days   Lab Units 01/22/20  0448 01/21/20  0948   PROTIME seconds 14 9* 24 5*   INR  1 21* 2 10*     ED Treatment:   Medication Administration from 01/21/2020 1125 to 01/21/2020 1326       Date/Time Order Dose Route Action Action by Comments   NONE     Past Medical History:   Diagnosis Date    Cardiac disease     Diabetes mellitus ([facility] )     Hyperlipidemia     Hypertension     Seizures ([facility] )     Skin cancer     Stroke ([facility] )      Present on Admission:   Subdural hematoma (HCC)   Hypoglycemia   Encephalopathy    Admitting Diagnosis: Trauma [T14 90XA]  Subdural hematoma ([facility] ) [S06 5X9A]  Fall [W19  XXXA]  Age/Sex: 78 y o  male  Admission Orders:  Scheduled Medications:  Medications:  allopurinol 100 mg Oral Daily   atorvastatin 10 mg Oral Daily   azithromycin 500 mg Intravenous Q24H   cefTRIAXone 2,000 mg Intravenous Q24H   finasteride 5 mg Oral Daily   levETIRAcetam 500 mg Oral Q12H SHOAIB   pantoprazole 20 mg Oral Early Morning   sertraline 50 mg Oral Daily   Continuous IV Infusions:   PRN Meds:  acetaminophen 650 mg Oral Q6H PRN   oxyCODONE 2 5 mg Oral Q4H PRN   oxyCODONE 5 mg Oral Q4H PRN   Neuro checks q1h  Santiago SCDs  TELM  IP CONSULT TO NEUROSURGERY  IP CONSULT TO GERONTOLOGY  IP CONSULT TO CASE MANAGEMENT    Network Utilization Review Department  [email or web address]  ATTENTION: Please call with any questions or concerns to [phone] and carefully listen to the prompts so that you are directed to the right person  All voicemails are confidential   Mariangel Dawson all requests for admission clinical reviews, approved or denied determinations and any other requests to dedicated fax number below belonging to the campus where the patient is receiving treatment   List of dedicated fax numbers for the Facilities:  [address] DENIALS (Administrative/Medical Necessity) [phone]   [address] (Maternity/NICU/Pediatrics) [phone]   Jonathan Kim [phone]   [facility] [phone]   [address] [place] [address] [phone]   [address] [place] [address]  [phone]   [address] [place] [address] [phone]   [facility]  [phone]   [address] [place] [address], [address]  2401 [facility] 1000 [facility] [phone]

## 2020-01-22 NOTE — QUICK NOTE
I attempted to call the patient's wife, she Mary Jo Shen, via the phone to provide an update on the patient's condition  There was no answer  Additionally, the patient has been off of his home oral diabetic medications and often insulin regimen due to episodes of hypoglycemia both pre-hospital and during his hospitalization  His blood sugars have steadily risen throughout the day today with most recent 1 being elevated to 325  Endocrinology has been consulted, but will likely not see the patient until 01/23/2020  At this time, I will initiate an insulin regimen including basal insulin and mealtime insulin as well as sliding scale coverage insulin  In addition to the insulin regimen, I will continue/initiate hypoglycemia protocol      Dontae Villalpando PA-C  1/22/2020 03:40 PM

## 2020-01-22 NOTE — ASSESSMENT & PLAN NOTE
- Appreciate Neurosurgery evaluation and recommendations  Non operative management recommended  - Awaiting repeat CT scan of the head today  - Continue to hold all anti-platelet and anticoagulant medications   - PT and OT evaluation and treatment as indicated  - Symptomatic management as indicated

## 2020-01-22 NOTE — PROGRESS NOTES
While performing hourly neuro check at 2200 pt seemed to be more confused  It was noted he was hypoglycemic by EMS per H&P  I checked his BG is was 49, and on recheck is was below 28  I ordered a STAT BMP  Was able to talk with Jhony Snyder from trauma who ordered and amp of D50  His BG on BMP was 37  After the amp his BG was 137  His diet was also changed and he ate a peanut butter and jelly sandwich along with a glass of milk  Per Jhony Snyder she wanted to recheck Q1 hour BG to make sure he they were stable  He has remained in between 120-140  Per Jhony Snyder okay to switch to Q4 hour after 0200 check

## 2020-01-22 NOTE — PROGRESS NOTES
Progress Note - Jihan Benson 1940, 78 y o  male MRN: [id number]    Unit/Bed#: [facility] 604-01 Encounter: [id number]    Primary Care Provider: Woodrow Liu DO   Date and time admitted to hospital: 1/21/2020 11:26 AM        * Subdural hematoma [facility])  [facility] Neurosurgery evaluation and recommendations  Non operative management recommended  - Awaiting repeat CT scan of the head today  - Continue to hold all anti-platelet and anticoagulant medications   - PT and OT evaluation and treatment as indicated  - Symptomatic management as indicated  Encephalopathy  Assessment & Plan  - Acute encephalopathy likely multifactorial in setting of TBI, hypoglycemia, and pneumonia  - Delirium precautions  - Await Geriatric Medicine evaluation and recommendations  - Continue treatment/management of associated medical/trauma conditions  Hypoglycemia  Assessment & Plan  - History of type 2 diabetes mellitus with use of oral medications  No history long-term insulin use  Patient was noted to be hypoglycemic in the pre-hospital setting as well as having episodes of hypoglycemia since admission   - Await endocrinology evaluation and recommendations  - Continue to hold all oral medications  - Hold insulin regimen for now and continue Accu-Cheks  Pneumonia involving right lung  Assessment & Plan  - Right-sided pneumonia, present on admission   - Continue current antibiotic regimen with plan to complete 5 day course of antibiotics  - Monitor respiratory status  - Encourage incentive spirometer use and adequate pulmonary hygiene  - Supplemental oxygen as needed only to maintain saturations greater than equal to 94%  Bedside nurse rounds completed with nurse Oj Bruner  Prophylaxis: Sequential compression device (Venodyne)     Disposition:  Anticipate need for discharge to post acute care facility for rehab once medically appropriate    Continue PT and OT evaluation and treatment as indicated  Case Management following for disposition planning  Code status:  Level 1 - Full Code    Consultants:     1  [facility] Neurosurgery  2  Geriatric Medicine  Is the patient 72 years or older?: YES:    1  Before the illness or injury that brought you to the Emergency, did you need someone to help you on a regular basis? 0=No   2  Since the illness or injury that brought you to the Emergency, have you needed more help than usual to take care of yourself? 1=Yes   3  Have you been hospitalized for one or more nights during the past 6 months (excluding a stay in the Emergency Department)? 0=No   4  In general, do you see well? 0=Yes   5  In general, do you have serious problems with your memory? 1=Yes   6  Do you take more than three different medications everyday? 1=Yes   TOTAL   3     Did you order a geriatric consult if the score was 2 or greater?: yes    SUBJECTIVE:     Transfer from: [address] [place] [address]  Outside Films Received: yes  Tertiary Exam Due on: 1/22/2020    Mechanism of Injury:Other: Unknown traumatic injury; suspect fall  Details related to Injury: +LOC:  yes    Chief Complaint: "Can I go home yet?"    HPI/Last 24 hour events:  Patient is feeling okay this morning  He offers no complaints  He remembers having issues with his blood sugar last night, but feels better this morning  He denies having headache, visual changes, lightheadedness or dizziness, chest pain, back pain, abdominal pain, nausea or vomiting, shortness of breath or difficulty breathing, any pain in his extremities, any weakness/tingling/numbness in his extremities      Active medications:           Current Facility-Administered Medications:     acetaminophen (TYLENOL) tablet 650 mg, 650 mg, Oral, Q6H PRN    allopurinol (ZYLOPRIM) tablet 100 mg, 100 mg, Oral, Daily, 100 mg at 01/22/20 0813    atorvastatin (LIPITOR) tablet 10 mg, 10 mg, Oral, Daily, 10 mg at 01/22/20 0813    azithromycin (ZITHROMAX) 500 mg in sodium chloride 0 9% 250mL IVPB 500 mg, 500 mg, Intravenous, Q24H, 500 mg at 01/21/20 2015    cefTRIAXone (ROCEPHIN) 2,000 mg in dextrose 5 % 50 mL IVPB, 2,000 mg, Intravenous, Q24H, 2,000 mg at 01/21/20 1525    finasteride (PROSCAR) tablet 5 mg, 5 mg, Oral, Daily, 5 mg at 01/22/20 0813    levETIRAcetam (KEPPRA) tablet 500 mg, 500 mg, Oral, Q12H SHOAIB, 500 mg at 01/22/20 0813    oxyCODONE (ROXICODONE) IR tablet 2 5 mg, 2 5 mg, Oral, Q4H PRN    oxyCODONE (ROXICODONE) IR tablet 5 mg, 5 mg, Oral, Q4H PRN    pantoprazole (PROTONIX) EC tablet 20 mg, 20 mg, Oral, Early Morning, 20 mg at 01/22/20 0556    sertraline (ZOLOFT) tablet 50 mg, 50 mg, Oral, Daily, 50 mg at 01/22/20 0813      OBJECTIVE:     Vitals:   Vitals:    01/22/20 1054   BP: 136/69   Pulse: 70   Resp: 20   Temp: 97 9 °F (36 6 °C)   SpO2: 94%       Physical Exam:   GENERAL APPEARANCE: Patient in no acute distress  HEENT: NCAT; PERRL, EOMs intact; Mucous membranes moist  NECK / BACK:  No cervical, thoracic or lumbar spine tenderness; no step-offs or deformities  CV: Regular rate and rhythm; + S1, S2; no murmur/gallops/rubs appreciated  CHEST / LUNGS:  Mildly decreased breath sounds at the right base, otherwise clear to auscultation; no wheezes/rales/rhonci; no chest wall tenderness, crepitus or deformity  ABD: NABS; soft; non-distended; non-tender  EXT: +2 pulses bilaterally upper & lower extremities; no clubbing/cyanosis/edema; normal range of motion in all 4 extremities without pain or tenderness  NEURO: GCS 15 (; stable, mild right-sided facial droop (chronic finding per chart), alert and oriented times 2 (oriented to himself and the time, but not place); neurovascularly intact  SKIN: Warm, dry and well perfused; no rash; no jaundice  I/O:   I/O       01/20 0701 - 01/21 0700 01/21 0701 - 01/22 0700 01/22 0701 - 01/23 0700    P  O   60     Total Intake(mL/kg)  60 (0 6)     Urine (mL/kg/hr)  650     Total Output  650     Net  -590 Invasive Devices: Invasive Devices     Peripheral Intravenous Line            Peripheral IV 01/21/20 Left Antecubital 1 day    Peripheral IV 01/21/20 Left Hand 1 day                  Imaging:   Xr Chest Pa & Lateral    Result Date: 1/21/2020  Impression: Question increased opacity in the right midlung and in the right posterior costophrenic angle on the lateral projection  If this is a real finding, it could represent pneumonia  The study was marked in [facility] for immediate notification  Workstation performed: [id number]     Ct Head Wo Contrast    Result Date: 1/21/2020  Impression: Small acute left frontal parafalcine subdural hematoma  No mass effect or shift  Generalized atrophy and left frontal encephalomalacia appearing stable    I personally discussed this study with Barbie Hdez on 1/21/2020 at 9:12 AM  Workstation performed: [id number]       Labs:   CBC:   Lab Results   Component Value Date    WBC 6 83 01/22/2020    HGB 10 0 (L) 01/22/2020    HCT 31 1 (L) 01/22/2020    MCV 99 (H) 01/22/2020     (L) 01/22/2020    MCH 31 9 01/22/2020    MCHC 32 2 01/22/2020    RDW 17 3 (H) 01/22/2020    MPV 11 3 01/22/2020     CMP:   Lab Results   Component Value Date     (H) 01/22/2020    CO2 25 01/22/2020    BUN 27 (H) 01/22/2020    CREATININE 1 30 01/22/2020    CALCIUM 8 9 01/22/2020    EGFR 51 01/22/2020     Coagulation:   Lab Results   Component Value Date    INR 1 21 (H) 01/22/2020       Kb Chaudhari PA-C  1/22/2020 07:41 AM

## 2020-01-22 NOTE — ASSESSMENT & PLAN NOTE
S/p multiple falls    Imaging:   · CT head wo 1/21/20: Acute left frontal parafalcine subdural hematoma  No mass effect or shift  · CT head w/o 1/22/20: Stable appearing left frontal parafalcine SDH  Final read pending  Plan:   · Continue to monitor neurological exam  GCS 14 with mild confusion  FC  No focal weakness  No drift  · STAT repeat CT head with any neurological decline including drop GCS 2pt in 1 hr  · Hold all anti-platelet and anticoagulation medications  · Patient was on ASA/plavix and Elquis prior to admission for history of CABG/stent/stroke  · Given DDAVP and Kcentra  · Keppra x 1 wk for sz ppx  · Mobilize with physical and occupational therapy  · DVT prophylaxis:  Bilateral SCDs only at this time  OK to begin pharm ppx given stable CTH  Neurosurgery will sign off  Patient will follow up as an outpatient in 2 weeks with a repeat CT head  Please don't hesitate to contact us with any additional questions or concerns

## 2020-01-22 NOTE — PLAN OF CARE
Problem: Nutrition/Hydration-ADULT  Goal: Nutrient/Hydration intake appropriate for improving, restoring or maintaining nutritional needs  Description  Monitor and assess patient's nutrition/hydration status for malnutrition  Collaborate with interdisciplinary team and initiate plan and interventions as ordered  Monitor patient's weight and dietary intake as ordered or per policy  Utilize nutrition screening tool and intervene as necessary  Determine patient's food preferences and provide high-protein, high-caloric foods as appropriate       INTERVENTIONS:  - Monitor oral intake, urinary output, labs, and treatment plans  - Assess nutrition and hydration status and recommend course of action  - Evaluate amount of meals eaten  - Assist patient with eating if necessary   - Allow adequate time for meals  - Recommend/ encourage appropriate diets, oral nutritional supplements, and vitamin/mineral supplements  - Order, calculate, and assess calorie counts as needed  - Recommend, monitor, and adjust tube feedings and TPN/PPN based on assessed needs  - Assess need for intravenous fluids  - Provide specific nutrition/hydration education as appropriate  - Include patient/family/caregiver in decisions related to nutrition  Outcome: Progressing     Problem: Prexisting or High Potential for Compromised Skin Integrity  Goal: Skin integrity is maintained or improved  Description  INTERVENTIONS:  - Identify patients at risk for skin breakdown  - Assess and monitor skin integrity  - Assess and monitor nutrition and hydration status  - Monitor labs   - Assess for incontinence   - Turn and reposition patient  - Assist with mobility/ambulation  - Relieve pressure over bony prominences  - Avoid friction and shearing  - Provide appropriate hygiene as needed including keeping skin clean and dry  - Evaluate need for skin moisturizer/barrier cream  - Collaborate with interdisciplinary team   - Patient/family teaching  - Consider wound care consult   Outcome: Progressing     Problem: Potential for Falls  Goal: Patient will remain free of falls  Description  INTERVENTIONS:  - Assess patient frequently for physical needs  -  Identify cognitive and physical deficits and behaviors that affect risk of falls    -  [place] fall precautions as indicated by assessment   - Educate patient/family on patient safety including physical limitations  - Instruct patient to call for assistance with activity based on assessment  - Modify environment to reduce risk of injury  - Consider OT/PT consult to assist with strengthening/mobility  Outcome: Progressing     Problem: PAIN - ADULT  Goal: Verbalizes/displays adequate comfort level or baseline comfort level  Description  Interventions:  - Encourage patient to monitor pain and request assistance  - Assess pain using appropriate pain scale  - Administer analgesics based on type and severity of pain and evaluate response  - Implement non-pharmacological measures as appropriate and evaluate response  - Consider cultural and social influences on pain and pain management  - Notify physician/advanced practitioner if interventions unsuccessful or patient reports new pain  Outcome: Progressing     Problem: INFECTION - ADULT  Goal: Absence or prevention of progression during hospitalization  Description  INTERVENTIONS:  - Assess and monitor for signs and symptoms of infection  - Monitor lab/diagnostic results  - Monitor all insertion sites, i e  indwelling lines, tubes, and drains  - Monitor endotracheal if appropriate and nasal secretions for changes in amount and color  - [place] appropriate cooling/warming therapies per order  - Administer medications as ordered  - Instruct and encourage patient and family to use good hand hygiene technique  - Identify and instruct in appropriate isolation precautions for identified infection/condition  Outcome: Progressing  Goal: Absence of fever/infection during neutropenic period  Description  INTERVENTIONS:  - Monitor WBC    Outcome: Progressing     Problem: SAFETY ADULT  Goal: Maintain or return to baseline ADL function  Description  INTERVENTIONS:  -  Assess patient's ability to carry out ADLs; assess patient's baseline for ADL function and identify physical deficits which impact ability to perform ADLs (bathing, care of mouth/teeth, toileting, grooming, dressing, etc )  - Assess/evaluate cause of self-care deficits   - Assess range of motion  - Assess patient's mobility; develop plan if impaired  - Assess patient's need for assistive devices and provide as appropriate  - Encourage maximum independence but intervene and supervise when necessary  - Involve family in performance of ADLs  - Assess for home care needs following discharge   - Consider OT consult to assist with ADL evaluation and planning for discharge  - Provide patient education as appropriate  Outcome: Progressing  Goal: Maintain or return mobility status to optimal level  Description  INTERVENTIONS:  - Assess patient's baseline mobility status (ambulation, transfers, stairs, etc )    - Identify cognitive and physical deficits and behaviors that affect mobility  - Identify mobility aids required to assist with transfers and/or ambulation (gait belt, sit-to-stand, lift, walker, cane, etc )  - [place] fall precautions as indicated by assessment  - Record patient progress and toleration of activity level on Mobility SBAR; progress patient to next Phase/Stage  - Instruct patient to call for assistance with activity based on assessment  - Consider rehabilitation consult to assist with strengthening/weightbearing, etc   Outcome: Progressing     Problem: DISCHARGE PLANNING  Goal: Discharge to home or other facility with appropriate resources  Description  INTERVENTIONS:  - Identify barriers to discharge w/patient and caregiver  - Arrange for needed discharge resources and transportation as appropriate  - Identify discharge learning needs (meds, wound care, etc )  - Arrange for interpretive services to assist at discharge as needed  - Refer to Case Management Department for coordinating discharge planning if the patient needs post-hospital services based on physician/advanced practitioner order or complex needs related to functional status, cognitive ability, or social support system  Outcome: Progressing     Problem: Knowledge Deficit  Goal: Patient/family/caregiver demonstrates understanding of disease process, treatment plan, medications, and discharge instructions  Description  Complete learning assessment and assess knowledge base    Interventions:  - Provide teaching at level of understanding  - Provide teaching via preferred learning methods  Outcome: Progressing

## 2020-01-22 NOTE — PLAN OF CARE
Problem: PHYSICAL THERAPY ADULT  Goal: Performs mobility at highest level of function for planned discharge setting  See evaluation for individualized goals  Description  Treatment/Interventions: OT, Spoke to case management, Spoke to nursing, Gait training, Bed mobility, Patient/family training, Endurance training, LE strengthening/ROM, Functional transfer training  Equipment Recommended: (pt reports having RW)       See flowsheet documentation for full assessment, interventions and recommendations  Outcome: Progressing  Note:   Prognosis: Good  Problem List: Decreased strength, Decreased endurance, Impaired balance, Decreased mobility, Decreased safety awareness, Decreased cognition, Impaired judgement  Assessment: Pt resting in bed at time of PT treatment session  Pt reports " I want to walk" and is willing to participate in PT treatment session  Pt able to perform all bed mobility with min A x 1 and all transfers with min A x 1 which is approximately the same level of assist as compared to PT eval  Pt continues to be impulsive with all mobility and nees constant cues for safety and to redirect attention  Pt tolerated increased ambulation distances this session with min A x 1 and the use of a RW  No gross LOB noted, although constant cues needed for safety and for RW management  Pt remained tangential throughout entire session and needs frequent cues  Pt able to tolerate and perform all therex seated OOB in chair this session without any increase in complaints  Cues required for proper form and pacing with therex  Pt assisted back into chair at conclusion of PT session with all needs within reach and chair alarm on  PT will continue to follow  D/C recommendation when medically cleared is rehab due to decreased functional mobility compared to baseline and increased a needed from caregiver at current time     Barriers to Discharge: Decreased caregiver support     Recommendation: Post acute IP rehab     PT - OK to Discharge: Yes(to rehab when medically cleared)    See flowsheet documentation for full assessment
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
## 2020-01-22 NOTE — ASSESSMENT & PLAN NOTE
S/p multiple falls    Imaging:   · CT head wo 1/21/20: Acute left frontal parafalcine subdural hematoma  No mass effect or shift  · CT head w/o 1/22/20: Stable appearing left frontal parafalcine SDH  Final read pending  Plan:   · Continue to monitor neurological exam  GCS 14 with mild confusion  FC  No focal weakness  No drift  · STAT repeat CT head with any neurological decline including drop GCS 2pt in 1 hr  · Hold all anti-platelet and anticoagulation medications for at least 2 weeks  · Patient was on ASA/plavix and Elquis prior to admission for history of CABG/stent/stroke  · Given DDAVP and Kcentra  · Keppra x 1 wk for sz ppx  · Mobilize with physical and occupational therapy  · DVT prophylaxis:  Bilateral SCDs only at this time  OK to begin pharm ppx given stable CTH  Neurosurgery will sign off  Patient will follow up as an outpatient in 2 weeks with a repeat CT head  Please don't hesitate to contact us with any additional questions or concerns

## 2020-01-22 NOTE — PROGRESS NOTES
Pt refusing to wear SCD pumps and Anibal but is neurologically unable to give proper consent  Will continue to monitor

## 2020-01-22 NOTE — QUICK NOTE
I spoke with the patient and his family, including his wife Renee Turcios, at the bedside to provide an update on his condition and his treatment plan  I explained that his repeat head CT demonstrated stable appearance of his traumatic brain injury and that no additional workup or intervention was necessary at this time from the neurosurgery standpoint unless the patient clinically declined  We also reviewed his issues with hypoglycemia, suspected pneumonia, and delirium  I explained that we will be having the endocrinology service evaluate him and make recommendations for his diabetic management, we will be continuing antibiotics for pneumonia, and the geriatric service will be seeing him for his encephalopathy and assistance with medication management      Brennen Mosqueda PA-C  1/22/2020 04:38 PM

## 2020-01-22 NOTE — ASSESSMENT & PLAN NOTE
- History of type 2 diabetes mellitus with use of oral medications  No history long-term insulin use  Patient was noted to be hypoglycemic in the pre-hospital setting as well as having episodes of hypoglycemia since admission   - Await endocrinology evaluation and recommendations  - Continue to hold all oral medications  - Hold insulin regimen for now and continue Accu-Cheks

## 2020-01-23 PROBLEM — Z45.018 BIVENTRICULAR PACEMAKER CHECK: Status: ACTIVE | Noted: 2020-01-23

## 2020-01-23 PROBLEM — G40.909 SEIZURE DISORDER (HCC): Status: ACTIVE | Noted: 2020-01-23

## 2020-01-23 PROBLEM — I50.42 CHRONIC COMBINED SYSTOLIC (CONGESTIVE) AND DIASTOLIC (CONGESTIVE) HEART FAILURE (HCC): Status: ACTIVE | Noted: 2020-01-23

## 2020-01-23 PROBLEM — I48.19 PERSISTENT ATRIAL FIBRILLATION (HCC): Status: ACTIVE | Noted: 2020-01-23

## 2020-01-23 PROBLEM — I25.10 CORONARY ARTERY DISEASE INVOLVING NATIVE CORONARY ARTERY OF NATIVE HEART WITHOUT ANGINA PECTORIS: Status: ACTIVE | Noted: 2020-01-23

## 2020-01-23 PROBLEM — E11.9 TYPE 2 DIABETES MELLITUS, WITHOUT LONG-TERM CURRENT USE OF INSULIN (HCC): Status: ACTIVE | Noted: 2020-01-23

## 2020-01-23 PROBLEM — I63.9 CVA (CEREBRAL VASCULAR ACCIDENT) (HCC): Status: ACTIVE | Noted: 2020-01-23

## 2020-01-23 LAB
ANION GAP SERPL CALCULATED.3IONS-SCNC: 4 MMOL/L (ref 4–13)
BASOPHILS # BLD AUTO: 0.07 THOUSANDS/ΜL (ref 0–0.1)
BASOPHILS NFR BLD AUTO: 1 % (ref 0–1)
BUN SERPL-MCNC: 19 MG/DL (ref 5–25)
CALCIUM SERPL-MCNC: 8.8 MG/DL (ref 8.3–10.1)
CHLORIDE SERPL-SCNC: 110 MMOL/L (ref 100–108)
CO2 SERPL-SCNC: 26 MMOL/L (ref 21–32)
CREAT SERPL-MCNC: 1.01 MG/DL (ref 0.6–1.3)
EOSINOPHIL # BLD AUTO: 0.13 THOUSAND/ΜL (ref 0–0.61)
EOSINOPHIL NFR BLD AUTO: 2 % (ref 0–6)
ERYTHROCYTE [DISTWIDTH] IN BLOOD BY AUTOMATED COUNT: 17.3 % (ref 11.6–15.1)
GFR SERPL CREATININE-BSD FRML MDRD: 69 ML/MIN/1.73SQ M
GLUCOSE SERPL-MCNC: 100 MG/DL (ref 65–140)
GLUCOSE SERPL-MCNC: 125 MG/DL (ref 65–140)
GLUCOSE SERPL-MCNC: 144 MG/DL (ref 65–140)
GLUCOSE SERPL-MCNC: 153 MG/DL (ref 65–140)
GLUCOSE SERPL-MCNC: 158 MG/DL (ref 65–140)
GLUCOSE SERPL-MCNC: 183 MG/DL (ref 65–140)
HCT VFR BLD AUTO: 32.4 % (ref 36.5–49.3)
HGB BLD-MCNC: 10.3 G/DL (ref 12–17)
IMM GRANULOCYTES # BLD AUTO: 0.02 THOUSAND/UL (ref 0–0.2)
IMM GRANULOCYTES NFR BLD AUTO: 0 % (ref 0–2)
LYMPHOCYTES # BLD AUTO: 0.77 THOUSANDS/ΜL (ref 0.6–4.47)
LYMPHOCYTES NFR BLD AUTO: 11 % (ref 14–44)
MCH RBC QN AUTO: 32.5 PG (ref 26.8–34.3)
MCHC RBC AUTO-ENTMCNC: 31.8 G/DL (ref 31.4–37.4)
MCV RBC AUTO: 102 FL (ref 82–98)
MONOCYTES # BLD AUTO: 0.73 THOUSAND/ΜL (ref 0.17–1.22)
MONOCYTES NFR BLD AUTO: 10 % (ref 4–12)
NEUTROPHILS # BLD AUTO: 5.53 THOUSANDS/ΜL (ref 1.85–7.62)
NEUTS SEG NFR BLD AUTO: 76 % (ref 43–75)
NRBC BLD AUTO-RTO: 0 /100 WBCS
PLATELET # BLD AUTO: 124 THOUSANDS/UL (ref 149–390)
PMV BLD AUTO: 11.5 FL (ref 8.9–12.7)
POTASSIUM SERPL-SCNC: 3.6 MMOL/L (ref 3.5–5.3)
RBC # BLD AUTO: 3.17 MILLION/UL (ref 3.88–5.62)
SODIUM SERPL-SCNC: 140 MMOL/L (ref 136–145)
WBC # BLD AUTO: 7.25 THOUSAND/UL (ref 4.31–10.16)

## 2020-01-23 PROCEDURE — 85025 COMPLETE CBC W/AUTO DIFF WBC: CPT | Performed by: SURGERY

## 2020-01-23 PROCEDURE — 99232 SBSQ HOSP IP/OBS MODERATE 35: CPT | Performed by: EMERGENCY MEDICINE

## 2020-01-23 PROCEDURE — 97535 SELF CARE MNGMENT TRAINING: CPT

## 2020-01-23 PROCEDURE — 99232 SBSQ HOSP IP/OBS MODERATE 35: CPT | Performed by: INTERNAL MEDICINE

## 2020-01-23 PROCEDURE — 82948 REAGENT STRIP/BLOOD GLUCOSE: CPT

## 2020-01-23 PROCEDURE — 97530 THERAPEUTIC ACTIVITIES: CPT

## 2020-01-23 PROCEDURE — 80048 BASIC METABOLIC PNL TOTAL CA: CPT | Performed by: SURGERY

## 2020-01-23 PROCEDURE — 99223 1ST HOSP IP/OBS HIGH 75: CPT | Performed by: INTERNAL MEDICINE

## 2020-01-23 RX ORDER — HEPARIN SODIUM 5000 [USP'U]/ML
5000 INJECTION, SOLUTION INTRAVENOUS; SUBCUTANEOUS EVERY 8 HOURS SCHEDULED
Status: DISCONTINUED | OUTPATIENT
Start: 2020-01-23 | End: 2020-01-27 | Stop reason: HOSPADM

## 2020-01-23 RX ADMIN — INSULIN LISPRO 1 UNITS: 100 INJECTION, SOLUTION INTRAVENOUS; SUBCUTANEOUS at 22:38

## 2020-01-23 RX ADMIN — CEFTRIAXONE 2000 MG: 2 INJECTION, POWDER, FOR SOLUTION INTRAMUSCULAR; INTRAVENOUS at 17:39

## 2020-01-23 RX ADMIN — HEPARIN SODIUM 5000 UNITS: 5000 INJECTION INTRAVENOUS; SUBCUTANEOUS at 13:09

## 2020-01-23 RX ADMIN — INSULIN LISPRO 1 UNITS: 100 INJECTION, SOLUTION INTRAVENOUS; SUBCUTANEOUS at 13:09

## 2020-01-23 RX ADMIN — INSULIN GLARGINE 14 UNITS: 100 INJECTION, SOLUTION SUBCUTANEOUS at 22:38

## 2020-01-23 RX ADMIN — SERTRALINE HYDROCHLORIDE 50 MG: 50 TABLET ORAL at 09:52

## 2020-01-23 RX ADMIN — HEPARIN SODIUM 5000 UNITS: 5000 INJECTION INTRAVENOUS; SUBCUTANEOUS at 22:38

## 2020-01-23 RX ADMIN — INSULIN LISPRO 5 UNITS: 100 INJECTION, SOLUTION INTRAVENOUS; SUBCUTANEOUS at 09:53

## 2020-01-23 RX ADMIN — HEPARIN SODIUM 5000 UNITS: 5000 INJECTION INTRAVENOUS; SUBCUTANEOUS at 09:52

## 2020-01-23 RX ADMIN — PANTOPRAZOLE SODIUM 20 MG: 20 TABLET, DELAYED RELEASE ORAL at 06:32

## 2020-01-23 RX ADMIN — INSULIN LISPRO 1 UNITS: 100 INJECTION, SOLUTION INTRAVENOUS; SUBCUTANEOUS at 18:20

## 2020-01-23 RX ADMIN — LEVETIRACETAM 500 MG: 500 TABLET, FILM COATED ORAL at 09:52

## 2020-01-23 RX ADMIN — AZITHROMYCIN MONOHYDRATE 500 MG: 500 INJECTION, POWDER, LYOPHILIZED, FOR SOLUTION INTRAVENOUS at 22:39

## 2020-01-23 RX ADMIN — FINASTERIDE 5 MG: 5 TABLET, FILM COATED ORAL at 09:52

## 2020-01-23 RX ADMIN — ATORVASTATIN CALCIUM 10 MG: 10 TABLET, FILM COATED ORAL at 09:52

## 2020-01-23 RX ADMIN — ALLOPURINOL 100 MG: 100 TABLET ORAL at 09:52

## 2020-01-23 RX ADMIN — LEVETIRACETAM 500 MG: 500 TABLET, FILM COATED ORAL at 22:40

## 2020-01-23 NOTE — SOCIAL WORK
Cm reviewed pt during care coordination rounds  Per medical team pt is recommended for rehab  Cm has provided pt and pt's wife with a list of rehab choices

## 2020-01-23 NOTE — OCCUPATIONAL THERAPY NOTE
Occupational Therapy Treatment Note:     01/23/20 0501   Restrictions/Precautions   Weight Bearing Precautions Per Order No   Other Precautions Cognitive; Impulsive; Fall Risk   Pain Assessment   Pain Score No Pain   Functional Standing Tolerance   Time ~3 mins   Activity static standing   Comments no AD HHA   Bed Mobility   Supine to Sit 4  Minimal assistance   Additional items Assist x 1; Increased time required;HOB elevated   Sit to Supine 4  Minimal assistance   Additional items Assist x 1; Increased time required   Transfers   Sit to Stand 4  Minimal assistance   Additional items Assist x 1; Increased time required;Verbal cues   Stand to Sit 4  Minimal assistance   Additional items Assist x 1; Increased time required;Verbal cues   Additional Comments side step toward HOB   Cognition   Overall Cognitive Status Impaired   Arousal/Participation Alert   Attention Difficulty attending to directions   Orientation Level Oriented to person;Oriented to place;Oriented to time;Disoriented to situation   Memory Decreased recall of precautions;Decreased recall of recent events   Following Commands Follows one step commands with increased time or repetition   Comments PArticipated in ACLS, see below for details  PT is very distractable and tangential, difficulit to redirect  Pt becomes easily frustrated during ACLS when task becomes increasingly difficly and hand ACLS back saying " Where did you get this from a different country? I'm done with this! "   Cognition Assessment Tools ACLS   Score 3 4   Activity Tolerance   Activity Tolerance Patient limited by fatigue   Medical Staff Made Aware NSG aware   Assessment   Assessment Pt was seen this date for OT tx session focusing on self  cares, particiaiton in ACLS, bed mobility, sit to stand , tranfers and overall activity tolernace  Pt scored a 3 4/6 0 on the ACLS, indicating pt should have 24 hour S and should NOT drive, please see below for details   Pt contineus to requrie min A for overall safety  Increased cues for redirect and overall direction following  Pt resting in supine, all needs in reach and alarm on  Would benefit from continued OT tx to improve overall functial abilites  COntineu to follow with [facility]   Plan   Treatment Interventions ADL retraining   Goal Expiration Date 01/31/20   OT Treatment Day 1   OT Frequency 3-5x/wk   Recommendation   OT Discharge Recommendation Short Term Rehab  (24 HOUR SUPERVISION)     3 4    Administered Veto Odor Cognitive Level Screen (ACLS)  Pt scored 3 4/6 0 indicating 24 hour care is recommended to sequence through routine steps of toileting, bathing, grooming and dressing  Behavior:  Speaks without considering comprehension of listener  May be distractible  Grooming:  Spontaneously sustains actions of combing, brushing, shaving with electric razor or applying makeup  Uses too much or too little toothpaste/makeup  Looks at objects but fails to note effects  Restrict access to harmful objects  Dressing:  Selects items laid out and begins to don garments  May pick several items and be unable to decide  May quit before finished or don several layers  May not pay attention to condition of garments (cleanliness or need for repair)  Bathing:  Picks up washcloth, soap, towel and wipes easy to reach body parts  May wash in one spot, forget to use soap, may not remove all dirt or quit before task is complete  Patient should not be left alone during bathing tasks  Walking and exercising:  Ambulates within 2 or 3 familiar rooms to access desirable activities  Can alter ambulation pace but is easily distracted  May be impulsive when changing positions  Has difficulty walking and talking at the same time  Is at risk for falls  Eating:  May anticipate meal times based on familiar signs (activity in kitchen)  Uses all utensils except knife  Rate may be rushed and may eat strongly preferred items only    Failure to observe manners may alienate others  Check food and beverage temperature  Cannot follow dietary restrictions  Toileting:  Recognizes need to void and goes to familiar bathrooms  May not close door while in bathroom  Dons and doffs clothing slowly, may need help with unusual fasteners  Wipes but does not check results or wipes repeatedly using excess toilet paper  May forget to wash hands  May leave zipper open  May need reminders to toilet in order to avoid accidents  Medications: May recognize medications by color or shape when it is given daily but does not note amounts or time of day  Does not understand purpose of medications or side effects, may mistake for candy  Prescription bottles need to be child proof  Store medications in secure location away from patient  Pre-measured medications should be handed to the patient  Use of adaptive devices: May be able to propel a wheelchair but cannot get around furniture and may get lost if allowed outside  May not be able to utilize adaptive devices in safe manner  May not be able to learn use of new adaptive device  Housekeeping:  No awareness of need for housekeeping  May  cloth and begin action of cleaning  Does not note results and may quit when distracted  Meal Preparation:  Does not plan for food  May eat from refrigerator  May be able to replicate repetitive actions for simple meal prep with supervision  May be impulsive and require frequent redirection  Restrict access to sharp tools and hot objects/surfaces  Spending money:  May recognize local currency  May hand money to another person in a familiar exchange situation; however, may not attend to amount given/received  May not understand money is owed for services  May loose money  Should have assistance for all finances  Shopping: Follows a guide to shopping areas  Looks in windows or at displays with no intent to purchase  May take items without paying    May fail to notice molina or if they have enough money to pay  Do not leave alone in shopping areas  Laundry:  May not recognize clothing is dirty  Has no awareness of methods of doing laundry  May do repetitive actions but may not be able to sequence through steps of task  Traveling: May be able to sit for 30 minutes in a car  May not be aware of destination  May recognize features on a familiar route  May attempt to enter or leave car before it is fully stopped  Use child safety locks  Telephone:  Able to  phone when it rings and say hello  May be able to call for another person or hang up when phone is not for them  Not able to take messages  May dial 1 or 2 known numbers, but may call for no reason  May forget to hang up     Driving:  Should not operate a motor vehicle         [employer], [address]

## 2020-01-23 NOTE — PROGRESS NOTES
Progress Note - Trauma   Reese Bhakta 78 y o  male MRN: [id number]  Unit/Bed#: [facility] 604-01 Encounter: [id number]    Assessment/Plan:    Pneumonia involving right lung  Assessment & Plan  - Right-sided pneumonia, present on admission   - Continue current antibiotic regimen with plan to complete 5 day course of antibiotics  - Monitor respiratory status  - Encourage incentive spirometer use and adequate pulmonary hygiene  - Supplemental oxygen as needed only to maintain saturations greater than equal to 94%  Encephalopathy  Assessment & Plan  - Acute encephalopathy likely multifactorial in setting of TBI, hypoglycemia, and pneumonia  - Delirium precautions  - follow-up geriatrics recommendations  - Continue treatment/management of associated medical/trauma conditions  Hypoglycemia  Assessment & Plan  - History of type 2 diabetes mellitus with use of oral medications  No history long-term insulin use  Patient was noted to be hypoglycemic in the pre-hospital setting as well as having episodes of hypoglycemia since admission   -follow-up endocrinology consultation  - Continue to hold all oral medications  - currently on 14 units Lantus q h s  And 5 units Humalog at meals plus sliding scale    * Subdural hematoma [facility])  Assessment & Plan  - Appreciate Neurosurgery evaluation and recommendations  Non operative management recommended  - repeat CT head (1/22) stable from prior  - cleared by Neurosurgery to resume pharmacologic DVT prophylaxis  - PT and OT evaluation and treatment as indicated  - Symptomatic management as indicated  Subjective/Objective     Subjective:  Patient noted to be hypoglycemic overnight  Did not eat dinner, patient states he ate a large/late lunch  Otherwise doing well this morning  Having bowel function, voiding  No new complaints        Objective:     Vitals: Temp:  [97 7 °F (36 5 °C)-97 9 °F (36 6 °C)] 97 9 °F (36 6 °C)  HR:  [61-70] 61  Resp:  [20-24] 24  BP: (127-160)/(67-79) 160/79  Body mass index is 31 95 kg/m²  I/O       01/21 0701 - 01/22 0700 01/22 0701 - 01/23 0700 01/23 0701 - 01/24 0700    P  O  60 680 120    Total Intake(mL/kg) 60 (0 6) 680 (7 1) 120 (1 3)    Urine (mL/kg/hr) 650 250 (0 1) 300 (99 4)    Total Output 650 250 300    Net -590 +430 -180           Unmeasured Urine Occurrence  2 x     Unmeasured Stool Occurrence  1 x           Physical Exam:  GEN: NAD  HEENT: MMM  Pupils equal round and reactive to light  CV: RRR  Lung: Normal effort  Ab: Soft, NT/ND  Extrem: No CCE  Neuro:  GCS 15  Following commands, answering questions appropriately  Moving all 4 extremities equally      Lab, Imaging and other studies: CBC with diff: No results found for: WBC, HGB, HCT, MCV, PLT, ADJUSTEDWBC, MCH, MCHC, RDW, MPV, NRBC, BMP/CMP: No results found for: SODIUM, K, CL, CO2, ANIONGAP, BUN, CREATININE, GLUCOSE, CALCIUM, AST, ALT, ALKPHOS, PROT, BILITOT, EGFR  VTE Pharmacologic Prophylaxis: Heparin  VTE Mechanical Prophylaxis: sequential compression device

## 2020-01-23 NOTE — ASSESSMENT & PLAN NOTE
Subdural hematoma likely secondary to a fall with possible association to acquired coagulopathy and platelet dysfunction secondary to Xarelto, aspirin and Plavix use  Patient administered Kcentra on admission   - Appreciate Neurosurgery evaluation and recommendations  Non operative management recommended  - Repeat CT head on 01/22/2020 was reviewed and appears stable from prior   - Cleared by Neurosurgery to resume pharmacologic DVT prophylaxis  - PT and OT evaluation and treatment as indicated  - Symptomatic management as indicated

## 2020-01-23 NOTE — PLAN OF CARE
Problem: OCCUPATIONAL THERAPY ADULT  Goal: Performs self-care activities at highest level of function for planned discharge setting  See evaluation for individualized goals  Description  Treatment Interventions: ADL retraining, Functional transfer training, Endurance training, Cognitive reorientation, Patient/family training, Equipment evaluation/education, Compensatory technique education, Activityengagement          See flowsheet documentation for full assessment, interventions and recommendations  Outcome: Progressing  Note:   Limitation: Decreased ADL status, Decreased Safe judgement during ADL, Decreased cognition, Decreased endurance, Decreased self-care trans, Decreased high-level ADLs  Prognosis: Good, Fair  Assessment: Pt was seen this date for OT tx session focusing on self  cares, particiaiton in ACLS, bed mobility, sit to stand , tranfers and overall activity tolernace  Pt scored a 3 4/6 0 on the ACLS, indicating pt should have 24 hour S and should NOT drive, please see below for details  Pt contineus to requrie min A for overall safety  Increased cues for redirect and overall direction following  Pt resting in supine, all needs in reach and alarm on  Would benefit from continued OT tx to improve overall functial abilites   [facility]     OT Discharge Recommendation: Short Term [facility](51 HOUR SUPERVISION)

## 2020-01-23 NOTE — ASSESSMENT & PLAN NOTE
- History of type 2 diabetes mellitus with use of oral medications  No history long-term insulin use  Patient was noted to be hypoglycemic in the pre-hospital setting as well as having episodes of hypoglycemia since admission   - Appreciate Endocrinology evaluation and recommendations  - Continue to hold all oral medications  - Continue subcutaneous insulin regimen and adjust regimen per Endocrinology recommendations  - Will need outpatient follow-up with primary care provider

## 2020-01-23 NOTE — CONSULTS
Consultation - Shanika Pearl 78 y o  male MRN: [id number]    Unit/Bed#: [facility] 604-01 Encounter: [id number]      Assessment/Plan     1  Hypoglycemia - suspect secondary to sulfonylurea (glipizide) in geriatric patient  Last dose was approximately 72-hours ago on 1/20 per patient's spouse  Hypoglycemia unawareness  · Discontinue glipizide and do not restart at discharge  · Discontinue prandial insulin and monitor on basal coverage only with Lantus 14 units QHS  · Change mealtime CSI to algorithm 1 and continue bedtime CSI algorithm 1  · Monitor blood sugars 4 times daily before meals and at bedtime  · Okay to continue regular diet    2  Type 2 diabetes mellitus - on metformin and glipizide as outpatient  A1c 5 5% on 1/22/2020  Periods of hypoglycemia with unawareness  · Recommend liberalizing glycemic control  · Discontinue glipizide  · At discharge, recommend metformin monotherapy with decreased dosing frequency to twice daily  · Inpatient glycemic management as detailed above     3  Right-sided pneumonia - improving  · Continue antibiotics and management per primary team    History of Present Illness   HPI: Shanika Pearl is a 78y o  year old male with history of CAD status post CABG, chronic combined systolic/diastolic heart failure, persistent atrial fibrillation, SSS status post PPM, noninsulin-dependent type 2 diabetes mellitus, seizure disorder, and prior CVA, initially presented to [facility] ED after being found unresponsive by wife  Workup in the ED was notable for hypoglycemia which transiently improved after IV dextrose, pneumonia, and small subdural hematoma  Patient was transferred to [facility] for trauma and neurosurgical evaluation  Patient has since been cleared by Neurosurgery  Endocrinology consultation requested for history of noninsulin-dependent type 2 diabetes mellitus with hypoglycemia at time of presentation and additional episodes during hospitalization      History is limited as patient is a poor historian with mild cognitive impairment secondary to prior CVA and is also very hard of hearing  According to patient's wife who was present at bedside during this encounter, patient takes 2 oral medications for his diabetes  She reports that he takes metformin 3 times a day and glipizide once a day  Last dose of glipizide was on Monday  At time of presentation, the patient was significantly hypoglycemic  The patient's oral diabetes medications were held at time of admission and he was placed on an appropriate weight-based basal-bolus insulin regimen  He has had several further episodes of hypoglycemia during his hospital course, appearing to occur primarily overnight  Questionable hypoglycemia awareness  Inpatient consult to Endocrinology     Date/Time 2020 10:19 AM     Performed by  Edward Donovan DO     Authorized by Lena Jolly PA-C          Review of Systems   HENT: Positive for hearing loss  Respiratory: Positive for cough  All other systems reviewed and are negative      Historical Information   Past Medical History:   Diagnosis Date    Cardiac disease     Diabetes mellitus [facility]     Hyperlipidemia     Hypertension     Seizures ([facility] )     Skin cancer     Stroke [facility])      Past Surgical History:   Procedure Laterality Date    CARDIAC SURGERY      bypass      Social History   Social History     Substance and Sexual Activity   Alcohol Use Never    Frequency: Never    Binge frequency: Never     Social History     Substance and Sexual Activity   Drug Use Not on file     Social History     Tobacco Use   Smoking Status Former Smoker    Last attempt to quit: Frances [place]ri[place] Years since quittin 0   Smokeless Tobacco Never Used     Family History:   Family History   Problem Relation Age of Onset    Alzheimer's disease Mother     Stroke Father     Suicide Attempts Son      Meds/Allergies   all current active meds have been reviewed, current meds: Current Facility-Administered Medications   Medication Dose Route Frequency    acetaminophen (TYLENOL) tablet 650 mg  650 mg Oral Q6H PRN    allopurinol (ZYLOPRIM) tablet 100 mg  100 mg Oral Daily    atorvastatin (LIPITOR) tablet 10 mg  10 mg Oral Daily    azithromycin (ZITHROMAX) 500 mg in sodium chloride 0 9% 250mL IVPB 500 mg  500 mg Intravenous Q24H    cefTRIAXone (ROCEPHIN) 2,000 mg in dextrose 5 % 50 mL IVPB  2,000 mg Intravenous Q24H    dextrose 50 % IV solution 25 mL  25 mL Intravenous PRN    finasteride (PROSCAR) tablet 5 mg  5 mg Oral Daily    heparin (porcine) subcutaneous injection 5,000 Units  5,000 Units Subcutaneous Q8H [address]    insulin glargine (LANTUS) subcutaneous injection 14 Units 0 14 mL  14 Units Subcutaneous HS    insulin lispro (HumaLOG) 100 units/mL subcutaneous injection 1-5 Units  1-5 Units Subcutaneous HS    insulin lispro (HumaLOG) 100 units/mL subcutaneous injection 1-6 Units  1-6 Units Subcutaneous TID AC    insulin lispro (HumaLOG) 100 units/mL subcutaneous injection 5 Units  5 Units Subcutaneous Daily With Breakfast    insulin lispro (HumaLOG) 100 units/mL subcutaneous injection 5 Units  5 Units Subcutaneous Daily With Lunch    insulin lispro (HumaLOG) 100 units/mL subcutaneous injection 5 Units  5 Units Subcutaneous Daily With Dinner    levETIRAcetam (KEPPRA) tablet 500 mg  500 mg Oral Q12H SHOAIB    oxyCODONE (ROXICODONE) IR tablet 2 5 mg  2 5 mg Oral Q4H PRN    oxyCODONE (ROXICODONE) IR tablet 5 mg  5 mg Oral Q4H PRN    pantoprazole (PROTONIX) EC tablet 20 mg  20 mg Oral Early Morning    sertraline (ZOLOFT) tablet 50 mg  50 mg Oral Daily    and PTA meds:   Prior to Admission Medications   Prescriptions Last Dose Informant Patient Reported?  Taking?   allopurinol (ZYLOPRIM) 100 mg tablet   Yes No   Sig: Take 100 mg by mouth daily   aspirin (ECOTRIN LOW STRENGTH) 81 mg EC tablet   Yes No   Sig: Take 81 mg by mouth daily   atorvastatin (LIPITOR) 10 mg tablet   Yes No Sig: Take 10 mg by mouth daily   carvedilol (COREG) 6 25 mg tablet   No No   Sig: TAKE ONE TABLET BY MOUTH TWICE DAILY WITH FOOD   clopidogrel (PLAVIX) 75 mg tablet   Yes No   Sig: Take by mouth daily   esomeprazole (NexIUM) 20 mg capsule   Yes No   Sig: Take 20 mg by mouth every morning before breakfast   finasteride (PROSCAR) 5 mg tablet   Yes No   Sig: Take 5 mg by mouth daily   glipiZIDE (GLUCOTROL) 10 mg tablet   Yes No   Sig: Take by mouth 2 (two) times a day before meals   levETIRAcetam (KEPPRA) 500 mg tablet   Yes No   Sig: Take 500 mg by mouth every 12 (twelve) hours   metFORMIN (GLUCOPHAGE) 500 mg tablet   Yes No   Sig: Take 500 mg by mouth 3 (three) times a day before meals   rivaroxaban (XARELTO) 10 mg tablet   Yes No   Sig: Take 10 mg by mouth daily   sertraline (ZOLOFT) 100 mg tablet   Yes No   Sig: Take 50 mg by mouth daily      Facility-Administered Medications: None     No Known Allergies    Objective   Vitals: Blood pressure 146/62, pulse 60, temperature 97 9 °F (36 6 °C), resp  rate (!) 24, height 5' 8" (1 727 m), weight 95 3 kg (210 lb 1 6 oz), SpO2 93 %  Intake/Output Summary (Last 24 hours) at 1/23/2020 1019  Last data filed at 1/23/2020 0900  Gross per 24 hour   Intake 800 ml   Output 800 ml   Net 0 ml     Invasive Devices     Peripheral Intravenous Line            Peripheral IV 01/23/20 Right;Ventral (anterior) Forearm less than 1 day              Physical Exam   Constitutional: He is oriented to person, place, and time  He appears well-developed  No distress  Elderly male   HENT:   Head: Normocephalic and atraumatic  Nose: Nose normal    Hard of hearing   Eyes: Conjunctivae and EOM are normal    Neck: Neck supple  No JVD present  No tracheal deviation present  Cardiovascular: Normal rate, regular rhythm and normal heart sounds  Pulmonary/Chest: [place] normal and breath sounds normal  No respiratory distress  Abdominal: Soft  Bowel sounds are normal  He exhibits no distension  There is no tenderness  There is no rebound and no guarding  Musculoskeletal: He exhibits no edema  Neurological: He is alert and oriented to person, place, and time  Skin: Skin is warm and dry  He is not diaphoretic  Left shoulder incision C/D/I   Psychiatric: He has a normal mood and affect  His behavior is normal    Nursing note and vitals reviewed  Lab Results: I have personally reviewed pertinent reports  Results from last 7 days   Lab Units 01/23/20  0819 01/22/20  0448 01/21/20  2153 01/21/20  0806   POTASSIUM mmol/L 3 6 3 7 3 5 4 4   CHLORIDE mmol/L 110* 113* 111* 106   CO2 mmol/L 26 25 25 24   BUN mg/dL 19 27* 24 25   CREATININE mg/dL 1 01 1 30 1 27 1 42*   CALCIUM mg/dL 8 8 8 9 8 9 9 2   ALK PHOS U/L  --   --   --  31*   ALT U/L  --   --   --  7   AST U/L  --   --   --  17     Imaging Studies: I have personally reviewed pertinent films in PACS   Xr Chest Pa & Lateral    Result Date: 1/21/2020  Impression: Question increased opacity in the right midlung and in the right posterior costophrenic angle on the lateral projection  If this is a real finding, it could represent pneumonia  The study was marked in [facility] for immediate notification  Workstation performed: [id number]     Ct Head Wo Contrast    Result Date: 1/22/2020  Impression: 1  Compared to prior head CT 1/21/2020, grossly stable left anterior falx and interhemispheric subdural hematoma  No new acute intracranial hemorrhage  2   Sequela of remote infarcts as described  Workstation performed: [id number]     Ct Head Wo Contrast    Result Date: 1/21/2020  Impression: Small acute left frontal parafalcine subdural hematoma  No mass effect or shift  Generalized atrophy and left frontal encephalomalacia appearing stable  I personally discussed this study with Kelly Negron on 1/21/2020 at 9:12 AM  Workstation performed: [id number]     EKG, Pathology, and Other Studies: I have personally reviewed pertinent reports      VTE Prophylaxis: Sequential compression device (Venodyne)  and Heparin    Code Status: Level 1 - Full Code  Advance Directive and Living Will:      Power of :    POLST:      Counseling / Coordination of Care  Total floor / unit time spent today 30 minutes  Greater than 50% of total time was spent with the patient and / or family counseling and / or coordination of care   A description of the counseling / coordination of care: N/A

## 2020-01-23 NOTE — SOCIAL WORK
Cm reviewed pt during care coordination rounds  Medical team has recommended pt for rehab  Cm provided pt with a list of rehab options  Pt has requested [facility] and 2) [facility] in [place], [place]  Pt did not want to select a third option  Cm has sent out referrals to both facilities, response pending

## 2020-01-23 NOTE — PROGRESS NOTES
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  -being seen by endocrinology service for recommendations for treatment when transitioned from [facility] hospital  -his diabetes control is likely too good with his hemoglobin A1c being 5 5% during this admission  -I recommend discontinuation of his glipizide    Cognitive impairment  -likely vascular in nature given history of CVA  -previous multiple traumatic brain injuries likely contributing, also  -continue with supportive services and safe discharge planning    Ambulatory dysfunction  -therapy recommending inpatient rehabilitation therapies once stable for discharge  -case management working on discharge plan    Hearing impairment  -he did very well with hearing amplifier again today  -he is pleased with the device and will look into of acquiring units for both he and his wife when he returns home      Subjective:   He is seen for a follow-up visit by the geriatric service to update the care and treatment of his acute metabolic encephalopathy, non-insulin-requiring type 2 diabetes mellitus, ambulatory dysfunction, cognitive impairment, and ambulatory dysfunction  Therapy department has recommended inpatient therapies upon discharge from [facility] hospital     He was seen by the neurosurgery service yesterday and clearance given to resume pharmacologic anticoagulation  Recommendation is for outpatient follow-up in 2 weeks with repeat CT imaging of subdural hematoma      He is being seen in consultation by the endocrinology service today to help with discharge planning for his diabetes care  He reports being tired today secondary to not sleeping well because of frequent interruptions  Otherwise, he feels well  Review of Systems   Respiratory: Positive for cough  Negative for shortness of breath  Cardiovascular: Negative for chest pain  Gastrointestinal: Negative for abdominal pain  Objective:     Vitals: Blood pressure 146/62, pulse 60, temperature 97 9 °F (36 6 °C), resp  rate (!) 24, height 5' 8" (1 727 m), weight 95 3 kg (210 lb 1 6 oz), SpO2 93 %  ,Body mass index is 31 95 kg/m²  Intake/Output Summary (Last 24 hours) at 1/23/2020 1203  Last data filed at 1/23/2020 0900  Gross per 24 hour   Intake 800 ml   Output 800 ml   Net 0 ml       Current Medications: Reviewed    Physical Exam:   Physical Exam   Constitutional: Vital signs are normal  He appears well-developed and well-nourished  He is cooperative  Non-toxic appearance  No distress  He is awake and appears comfortable lying in bed, his stated age, and frail  Eyes: Right eye exhibits no discharge  Left eye exhibits no discharge  No scleral icterus  Cardiovascular: Normal rate, regular rhythm and normal heart sounds  Exam reveals no gallop and no friction rub  No murmur heard  Pulmonary/Chest: Effort normal and breath sounds normal  No stridor  No respiratory distress  He has no wheezes  He has no rales  Neurological: He is alert  Skin: Skin is warm and dry  He is not diaphoretic  Left posterior shoulder incision remains clean, dry, intact  Suture line looks well  Psychiatric: He has a normal mood and affect          Invasive Devices     Peripheral Intravenous Line            Peripheral IV 01/23/20 Right;Ventral (anterior) Forearm less than 1 day                Lab, Imaging and other studies:       Lab Results   Component Value Date    WBC 7 25 01/23/2020    HGB 10 3 (L) 01/23/2020    HCT 32 4 (L) 01/23/2020     (H) 01/23/2020  (L) 01/23/2020     Lab Results   Component Value Date    SODIUM 140 01/23/2020    K 3 6 01/23/2020     (H) 01/23/2020    CO2 26 01/23/2020    BUN 19 01/23/2020    CREATININE 1 01 01/23/2020    GLUC 100 01/23/2020    CALCIUM 8 8 01/23/2020     Lab Results   Component Value Date    HGBA1C 5 5 01/22/2020

## 2020-01-23 NOTE — ASSESSMENT & PLAN NOTE
- Acute encephalopathy likely multifactorial in setting of TBI, hypoglycemia, and pneumonia  Encephalopathy is improving/resolving   - Delirium precautions  - [facility] evaluation and recommendations  - Continue treatment/management of associated medical/trauma conditions

## 2020-01-24 LAB
ANION GAP SERPL CALCULATED.3IONS-SCNC: 5 MMOL/L (ref 4–13)
BASOPHILS # BLD AUTO: 0.09 THOUSANDS/ΜL (ref 0–0.1)
BASOPHILS NFR BLD AUTO: 1 % (ref 0–1)
BUN SERPL-MCNC: 19 MG/DL (ref 5–25)
CALCIUM SERPL-MCNC: 9.1 MG/DL (ref 8.3–10.1)
CHLORIDE SERPL-SCNC: 110 MMOL/L (ref 100–108)
CO2 SERPL-SCNC: 28 MMOL/L (ref 21–32)
CREAT SERPL-MCNC: 1.08 MG/DL (ref 0.6–1.3)
EOSINOPHIL # BLD AUTO: 0.19 THOUSAND/ΜL (ref 0–0.61)
EOSINOPHIL NFR BLD AUTO: 3 % (ref 0–6)
ERYTHROCYTE [DISTWIDTH] IN BLOOD BY AUTOMATED COUNT: 17.2 % (ref 11.6–15.1)
GFR SERPL CREATININE-BSD FRML MDRD: 64 ML/MIN/1.73SQ M
GLUCOSE SERPL-MCNC: 104 MG/DL (ref 65–140)
GLUCOSE SERPL-MCNC: 106 MG/DL (ref 65–140)
GLUCOSE SERPL-MCNC: 217 MG/DL (ref 65–140)
GLUCOSE SERPL-MCNC: 256 MG/DL (ref 65–140)
GLUCOSE SERPL-MCNC: 96 MG/DL (ref 65–140)
HCT VFR BLD AUTO: 33.6 % (ref 36.5–49.3)
HGB BLD-MCNC: 10.8 G/DL (ref 12–17)
IMM GRANULOCYTES # BLD AUTO: 0.03 THOUSAND/UL (ref 0–0.2)
IMM GRANULOCYTES NFR BLD AUTO: 0 % (ref 0–2)
LYMPHOCYTES # BLD AUTO: 0.92 THOUSANDS/ΜL (ref 0.6–4.47)
LYMPHOCYTES NFR BLD AUTO: 13 % (ref 14–44)
MCH RBC QN AUTO: 32.2 PG (ref 26.8–34.3)
MCHC RBC AUTO-ENTMCNC: 32.1 G/DL (ref 31.4–37.4)
MCV RBC AUTO: 100 FL (ref 82–98)
MONOCYTES # BLD AUTO: 0.65 THOUSAND/ΜL (ref 0.17–1.22)
MONOCYTES NFR BLD AUTO: 9 % (ref 4–12)
NEUTROPHILS # BLD AUTO: 5.39 THOUSANDS/ΜL (ref 1.85–7.62)
NEUTS SEG NFR BLD AUTO: 74 % (ref 43–75)
NRBC BLD AUTO-RTO: 0 /100 WBCS
PLATELET # BLD AUTO: 143 THOUSANDS/UL (ref 149–390)
PMV BLD AUTO: 11.9 FL (ref 8.9–12.7)
POTASSIUM SERPL-SCNC: 3.5 MMOL/L (ref 3.5–5.3)
RBC # BLD AUTO: 3.35 MILLION/UL (ref 3.88–5.62)
SODIUM SERPL-SCNC: 143 MMOL/L (ref 136–145)
WBC # BLD AUTO: 7.27 THOUSAND/UL (ref 4.31–10.16)

## 2020-01-24 PROCEDURE — 80048 BASIC METABOLIC PNL TOTAL CA: CPT | Performed by: SURGERY

## 2020-01-24 PROCEDURE — 99232 SBSQ HOSP IP/OBS MODERATE 35: CPT | Performed by: SURGERY

## 2020-01-24 PROCEDURE — 99232 SBSQ HOSP IP/OBS MODERATE 35: CPT | Performed by: INTERNAL MEDICINE

## 2020-01-24 PROCEDURE — 85025 COMPLETE CBC W/AUTO DIFF WBC: CPT | Performed by: SURGERY

## 2020-01-24 PROCEDURE — 82948 REAGENT STRIP/BLOOD GLUCOSE: CPT

## 2020-01-24 RX ORDER — INSULIN GLARGINE 100 [IU]/ML
12 INJECTION, SOLUTION SUBCUTANEOUS
Status: DISCONTINUED | OUTPATIENT
Start: 2020-01-24 | End: 2020-01-27 | Stop reason: HOSPADM

## 2020-01-24 RX ADMIN — ALLOPURINOL 100 MG: 100 TABLET ORAL at 09:01

## 2020-01-24 RX ADMIN — ACETAMINOPHEN 650 MG: 325 TABLET ORAL at 04:41

## 2020-01-24 RX ADMIN — HEPARIN SODIUM 5000 UNITS: 5000 INJECTION INTRAVENOUS; SUBCUTANEOUS at 05:01

## 2020-01-24 RX ADMIN — CEFTRIAXONE 2000 MG: 2 INJECTION, POWDER, FOR SOLUTION INTRAMUSCULAR; INTRAVENOUS at 16:43

## 2020-01-24 RX ADMIN — FINASTERIDE 5 MG: 5 TABLET, FILM COATED ORAL at 09:01

## 2020-01-24 RX ADMIN — LEVETIRACETAM 500 MG: 500 TABLET, FILM COATED ORAL at 21:15

## 2020-01-24 RX ADMIN — INSULIN GLARGINE 12 UNITS: 100 INJECTION, SOLUTION SUBCUTANEOUS at 21:14

## 2020-01-24 RX ADMIN — INSULIN LISPRO 1 UNITS: 100 INJECTION, SOLUTION INTRAVENOUS; SUBCUTANEOUS at 21:15

## 2020-01-24 RX ADMIN — HEPARIN SODIUM 5000 UNITS: 5000 INJECTION INTRAVENOUS; SUBCUTANEOUS at 21:15

## 2020-01-24 RX ADMIN — AZITHROMYCIN MONOHYDRATE 500 MG: 500 INJECTION, POWDER, LYOPHILIZED, FOR SOLUTION INTRAVENOUS at 21:15

## 2020-01-24 RX ADMIN — ATORVASTATIN CALCIUM 10 MG: 10 TABLET, FILM COATED ORAL at 09:01

## 2020-01-24 RX ADMIN — LEVETIRACETAM 500 MG: 500 TABLET, FILM COATED ORAL at 09:02

## 2020-01-24 RX ADMIN — PANTOPRAZOLE SODIUM 20 MG: 20 TABLET, DELAYED RELEASE ORAL at 05:02

## 2020-01-24 RX ADMIN — HEPARIN SODIUM 5000 UNITS: 5000 INJECTION INTRAVENOUS; SUBCUTANEOUS at 13:14

## 2020-01-24 RX ADMIN — SERTRALINE HYDROCHLORIDE 50 MG: 50 TABLET ORAL at 09:02

## 2020-01-24 RX ADMIN — INSULIN LISPRO 2 UNITS: 100 INJECTION, SOLUTION INTRAVENOUS; SUBCUTANEOUS at 13:13

## 2020-01-24 NOTE — SOCIAL WORK
Pt accepted to [address] [place] [address] for SNF  [facility] as they can't assist with Sliding Scale

## 2020-01-24 NOTE — PROGRESS NOTES
Progress Note - Venkat Stevens 1940, 78 y o  male MRN: [id number]    Unit/Bed#: [facility] 604-01 Encounter: [id number]    Primary Care Provider: Tracey Guillen DO   Date and time admitted to hospital: 1/21/2020 11:26 AM        * Subdural hematoma (HCC)  Assessment & Plan  Subdural hematoma likely secondary to a fall with possible association to acquired coagulopathy and platelet dysfunction secondary to Xarelto, aspirin and Plavix use  Patient administered Kcentra on admission   - Appreciate Neurosurgery evaluation and recommendations  Non operative management recommended  - Repeat CT head on 01/22/2020 was reviewed and appears stable from prior   - Cleared by Neurosurgery to resume pharmacologic DVT prophylaxis  - PT and OT evaluation and treatment as indicated  - Symptomatic management as indicated  Encephalopathy  Assessment & Plan  - Acute encephalopathy likely multifactorial in setting of TBI, hypoglycemia, and pneumonia  Encephalopathy is improving/resolving   - Delirium precautions  - [facility] evaluation and recommendations  - Continue treatment/management of associated medical/trauma conditions  Hypoglycemia  Assessment & Plan  - History of type 2 diabetes mellitus with use of oral medications  No history long-term insulin use  Patient was noted to be hypoglycemic in the pre-hospital setting as well as having episodes of hypoglycemia since admission   - Appreciate Endocrinology evaluation and recommendations  - Continue to hold all oral medications  - Continue subcutaneous insulin regimen and adjust regimen per Endocrinology recommendations  - Will need outpatient follow-up with primary care provider  Pneumonia involving right lung  Assessment & Plan  - Right-sided pneumonia, present on admission   - Continue current antibiotic regimen with plan to complete 5 day course of antibiotics  - Monitor respiratory status    - Encourage incentive spirometer use and adequate pulmonary hygiene  - Supplemental oxygen as needed only to maintain saturations greater than equal to 94%  Bedside rounds completed with nurse Andie Johnson  Disposition:  Anticipate discharge to post acute care facility when placement available  Continue PT and OT evaluation and treatment as indicated  Case Management following for disposition planning      SUBJECTIVE:  Chief Complaint:  "I am doing just fine      Subjective:  Patient is feeling fine this morning  He offers no complaints  He denies having any headaches, dizziness or lightheadedness, visual changes, nausea or vomiting  He is tolerating his diet well  He continues to have episodes of confusion per the nursing staff        OBJECTIVE:     Meds/Allergies     Current Facility-Administered Medications:     acetaminophen (TYLENOL) tablet 650 mg, 650 mg, Oral, Q6H PRN, EDDIE Santos-SOL, 650 mg at 01/24/20 0441    allopurinol (ZYLOPRIM) tablet 100 mg, 100 mg, Oral, Daily, EDDIE Santos-SOL, 100 mg at 01/24/20 0901    atorvastatin (LIPITOR) tablet 10 mg, 10 mg, Oral, Daily, Param Chairez PA-C, 10 mg at 01/24/20 0901    azithromycin (ZITHROMAX) 500 mg in sodium chloride 0 9% 250mL IVPB 500 mg, 500 mg, Intravenous, Q24H, Tess Philippe MD, Last Rate: 250 mL/hr at 01/23/20 2239, 500 mg at 01/23/20 2239    cefTRIAXone (ROCEPHIN) 2,000 mg in dextrose 5 % 50 mL IVPB, 2,000 mg, Intravenous, Q24H, Tess Philippe MD, Last Rate: 100 mL/hr at 01/23/20 1739, 2,000 mg at 01/23/20 1739    dextrose 50 % IV solution 25 mL, 25 mL, Intravenous, PRN, Zhanna Khanna MD    finasteride (PROSCAR) tablet 5 mg, 5 mg, Oral, Daily, Param Chairez PA-C, 5 mg at 01/24/20 0901    heparin (porcine) subcutaneous injection 5,000 Units, 5,000 Units, Subcutaneous, Q8H [facility], Zhanna Khanna MD, 5,000 Units at 01/24/20 1314    insulin glargine (LANTUS) subcutaneous injection 12 Units 0 12 mL, 12 Units, Subcutaneous, Octavia GREGORY, DO    insulin lispro (HumaLOG) 100 units/mL subcutaneous injection 1-5 Units, 1-5 Units, Subcutaneous, HS, Arnol Martinez PA-C, 1 Units at 01/23/20 2238    insulin lispro (HumaLOG) 100 units/mL subcutaneous injection 1-5 Units, 1-5 Units, Subcutaneous, TID AC, Carolyn Felipe, DO, 2 Units at 01/24/20 1313    levETIRAcetam (KEPPRA) tablet 500 mg, 500 mg, Oral, Q12H [address], Carolyn Andersen PA-C, 500 mg at 01/24/20 0902    oxyCODONE (ROXICODONE) IR tablet 2 5 mg, 2 5 mg, Oral, Q4H PRN, Carolyn Andersen PA-C    oxyCODONE (ROXICODONE) IR tablet 5 mg, 5 mg, Oral, Q4H PRN, Carolyn Andersen PA-C    pantoprazole (PROTONIX) EC tablet 20 mg, 20 mg, Oral, Early Morning, Carolyn Andersen PA-C, 20 mg at 01/24/20 0502    sertraline (ZOLOFT) tablet 50 mg, 50 mg, Oral, Daily, Carolyn Andersen PA-C, 50 mg at 01/24/20 4548     Vitals:   Vitals:    01/24/20 1508   BP: 138/78   Pulse: 60   Resp: 21   Temp:    SpO2: 94%       Intake/Output:  I/O       01/22 0701 - 01/23 0700 01/23 0701 - 01/24 0700 01/24 0701 - 01/25 0700    P  O  680 840 360    Total Intake(mL/kg) 680 (7 1) 840 (8 8) 360 (3 8)    Urine (mL/kg/hr) 250 (0 1) 700 (0 3)     Stool  0     Total Output 250 700     Net +430 +140 +360           Unmeasured Urine Occurrence 2 x 2 x 1 x    Unmeasured Stool Occurrence 1 x 2 x 1 x           Nutrition/GI Proph/Bowel Reg:  Regular diet; Protonix  Physical Exam:   GENERAL APPEARANCE: Patient in no acute distress  HEENT: NCAT; PERRL, EOMs intact; Mucous membranes moist  CV: Regular rate and rhythm; + S1, S2; no murmur/gallops/rubs appreciated  CHEST / LUNGS: Clear to auscultation; no wheezes/rales/rhonci  ABD: NABS; soft; non-distended; non-tender  EXT: +2 pulses bilaterally upper & lower extremities; no clubbing/cyanosis/edema; mild left anterior knee tenderness, no pain with range of motion  NEURO: GCS 14 (E4, V4, M6) patient was mildly confused this morning; no focal neurologic deficits; neurovascularly intact    SKIN: Warm, dry and well perfused; no rash; no jaundice  Invasive Devices     Peripheral Intravenous Line            Peripheral IV 01/23/20 Right;Ventral (anterior) Forearm 1 day                 Lab Results:   Results: I have personally reviewed pertinent reports   , BMP/CMP:   Lab Results   Component Value Date    SODIUM 143 01/24/2020    K 3 5 01/24/2020     (H) 01/24/2020    CO2 28 01/24/2020    BUN 19 01/24/2020    CREATININE 1 08 01/24/2020    CALCIUM 9 1 01/24/2020    EGFR 64 01/24/2020   , CBC:   Lab Results   Component Value Date    WBC 7 27 01/24/2020    HGB 10 8 (L) 01/24/2020    HCT 33 6 (L) 01/24/2020     (H) 01/24/2020     (L) 01/24/2020    MCH 32 2 01/24/2020    MCHC 32 1 01/24/2020    RDW 17 2 (H) 01/24/2020    MPV 11 9 01/24/2020    NRBC 0 01/24/2020    and Coagulation: No results found for: PT, INR, APTT  Imaging/EKG Studies: Results: I have personally reviewed pertinent reports      Other Studies: N/A  VTE Prophylaxis: Sequential compression device (Venodyne)  and Heparin     Kb Chaudhari PA-C  1/24/2020 07:06 AM

## 2020-01-24 NOTE — PROGRESS NOTES
ENDOCRINOLOGY PROGRESS NOTE     PATIENT INFORMATION     Name: Venkat Stevens   Age & Sex: 78 y o  male   MRN: [id number]  Hospital Stay Days: 3  Unit/Bed#: [facility] 604-01   Encounter: [id number]    ASSESSMENT/PLAN     Principal Problem:    Subdural hematoma (HCC)  Active Problems:    Hypoglycemia    Encephalopathy    Pneumonia involving right lung    Biventricular pacemaker check    Coronary artery disease involving native coronary artery of native heart without angina pectoris    Chronic combined systolic (congestive) and diastolic (congestive) heart failure (HCC)    Type 2 diabetes mellitus, without long-term current use of insulin (HCC)    Seizure disorder (HCC)    CVA (cerebral vascular accident) ([facility] [address] )    Persistent atrial fibrillation    1  Type 2 diabetes mellitus with hypoglycemia - blood sugar readings more stable over last 24 hours  BG slightly low this morning at 96 but patient is asymptomatic  Episodes of hypoglycemia were likely secondary to sulfonylurea, which has likely cleared his system  A1c was 5 5% which is too low in geriatric patient  · Decrease Lantus to 12 units qHS  · Continue CSI-1 and monitor blood sugars before meals and at bedtime  · Discontinue glipizide and do not restart at discharge  · Continue metformin monotherapy at discharge  · Outpatient follow-up with PCP for ongoing management    SUBJECTIVE     Patient seen and examined  No acute events overnight  BG stable overnight  Slightly low this morning but patient is asymptomatic  Offers no acute complaints this morning      OBJECTIVE     Vitals:    20 2100 20 2345 20 2348 20 0730   BP: 129/82 136/80  136/79   BP Location: Right arm Right arm     Pulse: 60 65  63   Resp: 18 20  18   Temp: 98 4 °F (36 9 °C)  98 8 °F (37 1 °C) 97 9 °F (36 6 °C)   TempSrc: Oral  Oral    SpO2: 95% 92%  95%   Weight:       Height:          Temperature:   Temp (24hrs), Av 3 °F (36 8 °C), Min:97 9 °F (36 6 °C), Max:98 8 °F (37 1 °C)    Temperature: 97 9 °F (36 6 °C)  Intake & Output:  I/O       01/22 0701 - 01/23 0700 01/23 0701 - 01/24 0700 01/24 0701 - 01/25 0700    P  O  680 840     Total Intake(mL/kg) 680 (7 1) 840 (8 8)     Urine (mL/kg/hr) 250 (0 1) 700 (0 3)     Stool  0     Total Output 250 700     Net +430 +140            Unmeasured Urine Occurrence 2 x 2 x     Unmeasured Stool Occurrence 1 x 2 x         Weights:   IBW: 68 4 kg    Body mass index is 31 95 kg/m²  Weight (last 2 days)     Date/Time   Weight    01/22/20 0300   95 3 (210 1)            Physical Exam   Constitutional: He appears well-developed  Elderly male   HENT:   Head: Normocephalic and atraumatic  Nose: Nose normal    Eyes: Conjunctivae and EOM are normal    Neck: Neck supple  No tracheal deviation present  Cardiovascular: Normal rate, regular rhythm and normal heart sounds  Pulmonary/Chest: Effort normal and breath sounds normal  No respiratory distress  Abdominal: Soft  Bowel sounds are normal  He exhibits no distension  There is no tenderness  There is no rebound and no guarding  Musculoskeletal: He exhibits no edema or deformity  Neurological:   Sleeping but arouses to physical stimulus, hard of hearing   Skin: Skin is warm and dry  He is not diaphoretic  Psychiatric: He has a normal mood and affect  His behavior is normal    Nursing note and vitals reviewed  LABORATORY DATA     Labs: I have personally reviewed pertinent reports      Results from last 7 days   Lab Units 01/24/20 0455 01/23/20 0819 01/22/20 0448 01/21/20  0806   WBC Thousand/uL 7 27 7 25 6 83 8 20   HEMOGLOBIN g/dL 10 8* 10 3* 10 0* 11 0*   HEMATOCRIT % 33 6* 32 4* 31 1* 33 9*   PLATELETS Thousands/uL 143* 124* 130* 138*   NEUTROS PCT % 74 76*  --  86*   MONOS PCT % 9 10  --  5      Results from last 7 days   Lab Units 01/24/20 0455 01/23/20 0819 01/22/20 0448  01/21/20  0806   POTASSIUM mmol/L 3 5 3 6 3 7   < > 4 4   CHLORIDE mmol/L 110* 110* 113*   < > 106   CO2 mmol/L 28 26 25   < > 24   BUN mg/dL 19 19 27*   < > 25   CREATININE mg/dL 1 08 1 01 1 30   < > 1 42*   CALCIUM mg/dL 9 1 8 8 8 9   < > 9 2   ALK PHOS U/L  --   --   --   --  31*   ALT U/L  --   --   --   --  7   AST U/L  --   --   --   --  17    < > = values in this interval not displayed  Results from last 7 days   Lab Units 01/22/20  0448   MAGNESIUM mg/dL 1 3*     Results from last 7 days   Lab Units 01/22/20  0448   PHOSPHORUS mg/dL 3 2      Results from last 7 days   Lab Units 01/22/20  0448 01/21/20  0948   INR  1 21* 2 10*               IMAGING & DIAGNOSTIC TESTING     Radiology Results: I have personally reviewed pertinent films in PACS  Xr Chest Pa & Lateral    Result Date: 1/21/2020  Impression: Question increased opacity in the right midlung and in the right posterior costophrenic angle on the lateral projection  If this is a real finding, it could represent pneumonia  The study was marked in [facility] for immediate notification  Workstation performed: [id number]     Ct Head Wo Contrast    Result Date: 1/22/2020  Impression: 1  Compared to prior head CT 1/21/2020, grossly stable left anterior falx and interhemispheric subdural hematoma  No new acute intracranial hemorrhage  2   Sequela of remote infarcts as described  Workstation performed: [id number]     Ct Head Wo Contrast    Result Date: 1/21/2020  Impression: Small acute left frontal parafalcine subdural hematoma  No mass effect or shift  Generalized atrophy and left frontal encephalomalacia appearing stable  I personally discussed this study with Sisi Williamson on 1/21/2020 at 9:12 AM  Workstation performed: [id number]     Other Diagnostic Testing: I have personally reviewed pertinent reports        ACTIVE MEDICATIONS     Current Facility-Administered Medications   Medication Dose Route Frequency    acetaminophen (TYLENOL) tablet 650 mg  650 mg Oral Q6H PRN    allopurinol (ZYLOPRIM) tablet 100 mg  100 mg Oral Daily    atorvastatin (LIPITOR) tablet 10 mg  10 mg Oral Daily    azithromycin (ZITHROMAX) 500 mg in sodium chloride 0 9% 250mL IVPB 500 mg  500 mg Intravenous Q24H    cefTRIAXone (ROCEPHIN) 2,000 mg in dextrose 5 % 50 mL IVPB  2,000 mg Intravenous Q24H    dextrose 50 % IV solution 25 mL  25 mL Intravenous PRN    finasteride (PROSCAR) tablet 5 mg  5 mg Oral Daily    heparin (porcine) subcutaneous injection 5,000 Units  5,000 Units Subcutaneous Q8H [address]    insulin glargine (LANTUS) subcutaneous injection 14 Units 0 14 mL  14 Units Subcutaneous HS    insulin lispro (HumaLOG) 100 units/mL subcutaneous injection 1-5 Units  1-5 Units Subcutaneous HS    insulin lispro (HumaLOG) 100 units/mL subcutaneous injection 1-5 Units  1-5 Units Subcutaneous TID AC    levETIRAcetam (KEPPRA) tablet 500 mg  500 mg Oral Q12H [address]    oxyCODONE (ROXICODONE) IR tablet 2 5 mg  2 5 mg Oral Q4H PRN    oxyCODONE (ROXICODONE) IR tablet 5 mg  5 mg Oral Q4H PRN    pantoprazole (PROTONIX) EC tablet 20 mg  20 mg Oral Early Morning    sertraline (ZOLOFT) tablet 50 mg  50 mg Oral Daily     VTE Pharmacologic Prophylaxis: Heparin  VTE Mechanical Prophylaxis: sequential compression device    ==  Michael Gardner DO  Chief Resident, PGY-3  [facility] Internal Medicine Residency

## 2020-01-24 NOTE — PROGRESS NOTES
Progress Note - Geriatric Medicine   Naresh Alvarado 78 y o  male MRN: [id number]  Unit/Bed#: [facility] 604-01 Encounter: [id number]      Assessment/Plan:    Acute metabolic encephalopathy  -he continues to do well with treatment of his pneumonia and hypoglycemia  -continue with delirium precautions as noted in geriatric consultation  -will continue to follow along with you    Acute subdural hematoma  -status post evaluation by neurosurgery  -okay given to restart anticoagulation  -plan is to follow up in their office in 2 weeks with repeat imaging    Non-insulin requiring 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  -seen by endocrinology service yesterday who agreed with discontinuation of sulfonylurea and restarting metformin upon discharge    Ambulatory dysfunction  -he is to be discharged to rehabilitation facility for therapies to progress to prior level of functioning    Hearing impairment  -recommendation has been given to him to obtain a hearing amplifier  -printout regarding this information will be available to him with discharge paperwork    Left knee pain  -appears to be related to his left medial collateral ligament  -agree with Tylenol, ice therapy, and physical therapy  -continue with management at rehabilitation facility    Cognitive impairment  -likely vascular in nature given history of CVA  -continue with supportive services and arranging for safe discharge planning to community    Pneumonia  -symptom medically and improved with antibiotic course  -follow-up as outpatient with PCP    Mild anemia  -lab values have remained stable during hospitalization  -recommend to follow-up with PCP when discharged    Subjective:   He is seen for a follow-up visit by the geriatric service to update the care and treatment of metabolic encephalopathy, non-insulin requiring type 2 diabetes mellitus with episode of hypoglycemia, cognitive impairment, and ambulatory dysfunction      He reports sleeping well overnight except for having left knee discomfort  Nursing reports that he complained of left knee pain around 3:00 a m  earlier today  She reports applying ice and given Tylenol with good results  He states the pain is on the inside of his left knee  He denies difficulty walking because of the pain  He states the ice therapy was very helpful  He reports that his cough is much improved  He does not have any chest pain or trouble breathing  He was seen by the Endocrinology Service, yesterday  They agree with recommendation of discontinuing his sulfonylurea and restarting his metformin upon discharge from the hospital      Case management is looking into a rehabilitation stay after hospital discharge  He reports going through rehabilitation after his heart attack and felt it was helpful  Review of Systems   Respiratory: Negative for cough (Much improved) and shortness of breath  Cardiovascular: Negative for chest pain  Musculoskeletal:        Left knee pain-see HPI  Objective:     Vitals: Blood pressure 136/79, pulse 63, temperature 97 9 °F (36 6 °C), resp  rate 18, height 5' 8" (1 727 m), weight 95 3 kg (210 lb 1 6 oz), SpO2 95 %  ,Body mass index is 31 95 kg/m²  Intake/Output Summary (Last 24 hours) at 1/24/2020 1030  Last data filed at 1/24/2020 0900  Gross per 24 hour   Intake 720 ml   Output 150 ml   Net 570 ml       Current Medications: Reviewed    Physical Exam:   Physical Exam   Constitutional: Vital signs are normal  He appears well-developed and well-nourished  He is cooperative  Non-toxic appearance  No distress  He has difficulty hearing  He is awake and appears comfortable lying in bed, his stated age, and frail  Eyes: No scleral icterus  Cardiovascular: Normal rate, regular rhythm and normal heart sounds  Exam reveals no gallop and no friction rub  No murmur heard  Pulmonary/Chest: Effort normal  No stridor  No respiratory distress  He has no wheezes   He has no rales    Musculoskeletal:   There is no deformity or effusion noted about his left knee  There is no pain with passive range of motion of left knee  There is no meniscal tenderness elicited  There is tenderness over his medial collateral ligament  There is no tenderness over his lateral collateral ligament  Neurological: He is alert  Invasive Devices     Peripheral Intravenous Line            Peripheral IV 01/23/20 Right;Ventral (anterior) Forearm 1 day                Lab, Imaging and other studies: I have personally reviewed pertinent reports  Lab Results   Component Value Date    WBC 7 27 01/24/2020    HGB 10 8 (L) 01/24/2020    HCT 33 6 (L) 01/24/2020     (H) 01/24/2020     (L) 01/24/2020     Lab Results   Component Value Date    SODIUM 143 01/24/2020    K 3 5 01/24/2020     (H) 01/24/2020    CO2 28 01/24/2020    BUN 19 01/24/2020    CREATININE 1 08 01/24/2020    GLUC 106 01/24/2020    CALCIUM 9 1 01/24/2020     lastglu

## 2020-01-24 NOTE — PLAN OF CARE
Problem: Nutrition/Hydration-ADULT  Goal: Nutrient/Hydration intake appropriate for improving, restoring or maintaining nutritional needs  Description  Monitor and assess patient's nutrition/hydration status for malnutrition  Collaborate with interdisciplinary team and initiate plan and interventions as ordered  Monitor patient's weight and dietary intake as ordered or per policy  Utilize nutrition screening tool and intervene as necessary  Determine patient's food preferences and provide high-protein, high-caloric foods as appropriate       INTERVENTIONS:  - Monitor oral intake, urinary output, labs, and treatment plans  - Assess nutrition and hydration status and recommend course of action  - Evaluate amount of meals eaten  - Assist patient with eating if necessary   - Allow adequate time for meals  - Recommend/ encourage appropriate diets, oral nutritional supplements, and vitamin/mineral supplements  - Order, calculate, and assess calorie counts as needed  - Recommend, monitor, and adjust tube feedings and TPN/PPN based on assessed needs  - Assess need for intravenous fluids  - Provide specific nutrition/hydration education as appropriate  - Include patient/family/caregiver in decisions related to nutrition  Outcome: Progressing     Problem: Prexisting or High Potential for Compromised Skin Integrity  Goal: Skin integrity is maintained or improved  Description  INTERVENTIONS:  - Identify patients at risk for skin breakdown  - Assess and monitor skin integrity  - Assess and monitor nutrition and hydration status  - Monitor labs   - Assess for incontinence   - Turn and reposition patient  - Assist with mobility/ambulation  - Relieve pressure over bony prominences  - Avoid friction and shearing  - Provide appropriate hygiene as needed including keeping skin clean and dry  - Evaluate need for skin moisturizer/barrier cream  - Collaborate with interdisciplinary team   - Patient/family teaching  - Consider wound care consult   Outcome: Progressing     Problem: Potential for Falls  Goal: Patient will remain free of falls  Description  INTERVENTIONS:  - Assess patient frequently for physical needs  -  Identify cognitive and physical deficits and behaviors that affect risk of falls    -  [place] fall precautions as indicated by assessment   - Educate patient/family on patient safety including physical limitations  - Instruct patient to call for assistance with activity based on assessment  - Modify environment to reduce risk of injury  - Consider OT/PT consult to assist with strengthening/mobility  Outcome: Progressing     Problem: PAIN - ADULT  Goal: Verbalizes/displays adequate comfort level or baseline comfort level  Description  Interventions:  - Encourage patient to monitor pain and request assistance  - Assess pain using appropriate pain scale  - Administer analgesics based on type and severity of pain and evaluate response  - Implement non-pharmacological measures as appropriate and evaluate response  - Consider cultural and social influences on pain and pain management  - Notify physician/advanced practitioner if interventions unsuccessful or patient reports new pain  Outcome: Progressing     Problem: INFECTION - ADULT  Goal: Absence or prevention of progression during hospitalization  Description  INTERVENTIONS:  - Assess and monitor for signs and symptoms of infection  - Monitor lab/diagnostic results  - Monitor all insertion sites, i e  indwelling lines, tubes, and drains  - Monitor endotracheal if appropriate and nasal secretions for changes in amount and color  - [place] appropriate cooling/warming therapies per order  - Administer medications as ordered  - Instruct and encourage patient and family to use good hand hygiene technique  - Identify and instruct in appropriate isolation precautions for identified infection/condition  Outcome: Progressing  Goal: Absence of fever/infection during neutropenic period  Description  INTERVENTIONS:  - Monitor WBC    Outcome: Progressing     Problem: SAFETY ADULT  Goal: Maintain or return to baseline ADL function  Description  INTERVENTIONS:  -  Assess patient's ability to carry out ADLs; assess patient's baseline for ADL function and identify physical deficits which impact ability to perform ADLs (bathing, care of mouth/teeth, toileting, grooming, dressing, etc )  - Assess/evaluate cause of self-care deficits   - Assess range of motion  - Assess patient's mobility; develop plan if impaired  - Assess patient's need for assistive devices and provide as appropriate  - Encourage maximum independence but intervene and supervise when necessary  - Involve family in performance of ADLs  - Assess for home care needs following discharge   - Consider OT consult to assist with ADL evaluation and planning for discharge  - Provide patient education as appropriate  Outcome: Progressing  Goal: Maintain or return mobility status to optimal level  Description  INTERVENTIONS:  - Assess patient's baseline mobility status (ambulation, transfers, stairs, etc )    - Identify cognitive and physical deficits and behaviors that affect mobility  - Identify mobility aids required to assist with transfers and/or ambulation (gait belt, sit-to-stand, lift, walker, cane, etc )  - [place] fall precautions as indicated by assessment  - Record patient progress and toleration of activity level on Mobility SBAR; progress patient to next Phase/Stage  - Instruct patient to call for assistance with activity based on assessment  - Consider rehabilitation consult to assist with strengthening/weightbearing, etc   Outcome: Progressing     Problem: DISCHARGE PLANNING  Goal: Discharge to home or other facility with appropriate resources  Description  INTERVENTIONS:  - Identify barriers to discharge w/patient and caregiver  - Arrange for needed discharge resources and transportation as appropriate  - Identify discharge learning needs (meds, wound care, etc )  - Arrange for interpretive services to assist at discharge as needed  - Refer to Case Management Department for coordinating discharge planning if the patient needs post-hospital services based on physician/advanced practitioner order or complex needs related to functional status, cognitive ability, or social support system  Outcome: Progressing     Problem: Knowledge Deficit  Goal: Patient/family/caregiver demonstrates understanding of disease process, treatment plan, medications, and discharge instructions  Description  Complete learning assessment and assess knowledge base    Interventions:  - Provide teaching at level of understanding  - Provide teaching via preferred learning methods  Outcome: Progressing

## 2020-01-25 LAB
GLUCOSE SERPL-MCNC: 107 MG/DL (ref 65–140)
GLUCOSE SERPL-MCNC: 162 MG/DL (ref 65–140)
GLUCOSE SERPL-MCNC: 173 MG/DL (ref 65–140)
GLUCOSE SERPL-MCNC: 215 MG/DL (ref 65–140)

## 2020-01-25 PROCEDURE — 99232 SBSQ HOSP IP/OBS MODERATE 35: CPT | Performed by: SURGERY

## 2020-01-25 PROCEDURE — 82948 REAGENT STRIP/BLOOD GLUCOSE: CPT

## 2020-01-25 PROCEDURE — 97530 THERAPEUTIC ACTIVITIES: CPT

## 2020-01-25 RX ADMIN — INSULIN LISPRO 1 UNITS: 100 INJECTION, SOLUTION INTRAVENOUS; SUBCUTANEOUS at 16:44

## 2020-01-25 RX ADMIN — AZITHROMYCIN MONOHYDRATE 500 MG: 500 INJECTION, POWDER, LYOPHILIZED, FOR SOLUTION INTRAVENOUS at 20:16

## 2020-01-25 RX ADMIN — LEVETIRACETAM 500 MG: 500 TABLET, FILM COATED ORAL at 08:33

## 2020-01-25 RX ADMIN — LEVETIRACETAM 500 MG: 500 TABLET, FILM COATED ORAL at 20:16

## 2020-01-25 RX ADMIN — SERTRALINE HYDROCHLORIDE 50 MG: 50 TABLET ORAL at 08:31

## 2020-01-25 RX ADMIN — CEFTRIAXONE 2000 MG: 2 INJECTION, POWDER, FOR SOLUTION INTRAMUSCULAR; INTRAVENOUS at 16:24

## 2020-01-25 RX ADMIN — INSULIN GLARGINE 12 UNITS: 100 INJECTION, SOLUTION SUBCUTANEOUS at 21:42

## 2020-01-25 RX ADMIN — HEPARIN SODIUM 5000 UNITS: 5000 INJECTION INTRAVENOUS; SUBCUTANEOUS at 21:42

## 2020-01-25 RX ADMIN — FINASTERIDE 5 MG: 5 TABLET, FILM COATED ORAL at 08:32

## 2020-01-25 RX ADMIN — ATORVASTATIN CALCIUM 10 MG: 10 TABLET, FILM COATED ORAL at 08:31

## 2020-01-25 RX ADMIN — DICLOFENAC SODIUM 2 G: 10 GEL TOPICAL at 21:42

## 2020-01-25 RX ADMIN — HEPARIN SODIUM 5000 UNITS: 5000 INJECTION INTRAVENOUS; SUBCUTANEOUS at 05:19

## 2020-01-25 RX ADMIN — INSULIN LISPRO 1 UNITS: 100 INJECTION, SOLUTION INTRAVENOUS; SUBCUTANEOUS at 12:34

## 2020-01-25 RX ADMIN — OXYCODONE HYDROCHLORIDE 5 MG: 5 TABLET ORAL at 11:56

## 2020-01-25 RX ADMIN — HEPARIN SODIUM 5000 UNITS: 5000 INJECTION INTRAVENOUS; SUBCUTANEOUS at 14:20

## 2020-01-25 RX ADMIN — DICLOFENAC SODIUM 2 G: 10 GEL TOPICAL at 18:23

## 2020-01-25 RX ADMIN — PANTOPRAZOLE SODIUM 20 MG: 20 TABLET, DELAYED RELEASE ORAL at 05:18

## 2020-01-25 RX ADMIN — ALLOPURINOL 100 MG: 100 TABLET ORAL at 08:34

## 2020-01-25 RX ADMIN — INSULIN LISPRO 1 UNITS: 100 INJECTION, SOLUTION INTRAVENOUS; SUBCUTANEOUS at 21:42

## 2020-01-25 NOTE — PHYSICAL THERAPY NOTE
Physical Therapy Progress Note     01/25/20 1125   Pain Assessment   Pain Assessment 0-10   Pain Score Worst Possible Pain   Pain Location Knee   Pain Orientation Left   Pain Rating: FLACC (Rest) - Face 0   Pain Rating: FLACC (Rest) - Legs 0   Pain Rating: FLACC (Rest) - Activity 0   Pain Rating: FLACC (Rest) - Cry 1   Pain Rating: FLACC (Rest) - Consolability 0   Score: FLACC (Rest) 1   Pain Rating: FLACC (Activity) - Face 1   Pain Rating: FLACC (Activity) - Legs 0   Pain Rating: FLACC (Activity) - Activity 0   Pain Rating: FLACC (Activity) - Cry 1   Pain Rating: FLACC (Activity) - Consolability 0   Score: FLACC (Activity) 2   Restrictions/Precautions   Other Precautions Cognitive; Chair Alarm; Bed Alarm;Pain; Fall Risk   Subjective   Subjective Pt encountered supine in bed, initially asleep, easily roused & pleasant ,but refusing ambulation due to pain in L knee   "it's fine at home, but it hurts while I'm here now "  Does not appear to be in distress otherwise  Pt is [place], easily distracted & difficult to redirect in conversation to task at hand  Bed Mobility   Supine to Sit 5  Supervision   Additional items Assist x 1; Increased time required   Transfers   Sit to Stand 4  Minimal assistance   Additional items Assist x 1; Armrests; Increased time required;Verbal cues   Stand to Sit 4  Minimal assistance   Additional items Assist x 1; Armrests; Increased time required   Stand pivot 4  Minimal assistance   Additional items Assist x 1; Increased time required   Ambulation/Elevation   Gait pattern Excessively slow; Step to;Short stride;Decreased foot clearance; Antalgic; Improper Weight shift   Gait Assistance 4  Minimal assist   Additional items Assist x 1   Assistive Device Rolling walker   Distance 3' bed to chair   Balance   Static Sitting Fair +   Static Standing Fair -   Ambulatory Poor +   Endurance Deficit   Endurance Deficit Yes   Endurance Deficit Description pain in L knee, impaired cognition   Activity Tolerance Activity Tolerance Patient tolerated treatment well;Patient limited by pain   Nurse Latrice Robbins 2, RN   Assessment   Prognosis Good   Problem List Decreased strength;Decreased endurance; Impaired balance;Decreased mobility; Decreased safety awareness;Decreased cognition; Impaired judgement   Assessment Pt demonstrated decreased mobility this session due to complaints of increased pain in L knee througout session  Reports it is on medial aspect of knee, with some pain with palpation, but no pain with AROM  Pain increases significantly as per pt with weight bearing, but pt able to do so without LOB or need for increased assist for mobility compared to last session  Encouraged pt to increase ambulation during session, but pt adamently refused at this time  Agreeable to transfer out to chair & was able to perform additional standing transfer to fix linens in chair  Increased time required for all mobility due to pt being easily distracted by conversation & being [facility], which makes it more difficult to redirect to task  At conclusion of session, pt remained in chair with all needs in reach & chair alarm active  RN notified of pt's position & current mobility status  Will continue to benefit from therapy services to improve ambulatory tolerances, improve endurance, and reduce need for assist with mobilty tasks at this time  Barriers to Discharge Decreased caregiver support   Goals   Patient Goals for pain in knee to go down   STG Expiration Date 02/02/20   PT Treatment Day 2   Plan   Treatment/Interventions Functional transfer training;LE strengthening/ROM; Therapeutic exercise; Endurance training;Patient/family training;Equipment eval/education; Bed mobility;Gait training;Elevations   Progress Progressing toward goals   PT Frequency   (3-5x/week)   Recommendation   Recommendation Post acute IP rehab   Equipment Recommended Dannie Yun, ARCHANA

## 2020-01-25 NOTE — ASSESSMENT & PLAN NOTE
Lab Results   Component Value Date    HGBA1C 5 5 01/22/2020       Recent Labs     01/24/20  1211 01/24/20  1708 01/24/20 2056 01/25/20  0809   POCGLU 256* 104 217* 107       Blood Sugar Average: Last 72 hrs:  (P) 769 [id number]

## 2020-01-25 NOTE — ASSESSMENT & PLAN NOTE
Wt Readings from Last 3 Encounters:   01/22/20 95 3 kg (210 lb 1 6 oz)   01/21/20 97 2 kg (214 lb 6 oz)   09/10/18 95 kg (209 lb 7 oz)

## 2020-01-25 NOTE — PLAN OF CARE
Problem: PHYSICAL THERAPY ADULT  Goal: Performs mobility at highest level of function for planned discharge setting  See evaluation for individualized goals  Description  Treatment/Interventions: OT, Spoke to case management, Spoke to nursing, Gait training, Bed mobility, Patient/family training, Endurance training, LE strengthening/ROM, Functional transfer training  Equipment Recommended: (pt reports having RW)       See flowsheet documentation for full assessment, interventions and recommendations  Outcome: Progressing  Note:   Prognosis: Good  Problem List: Decreased strength, Decreased endurance, Impaired balance, Decreased mobility, Decreased safety awareness, Decreased cognition, Impaired judgement  Assessment: Pt demonstrated decreased mobility this session due to complaints of increased pain in L knee througout session  Reports it is on medial aspect of knee, with some pain with palpation, but no pain with AROM  Pain increases significantly as per pt with weight bearing, but pt able to do so without LOB or need for increased assist for mobility compared to last session  Encouraged pt to increase ambulation during session, but pt adamently refused at this time  Agreeable to transfer out to chair & was able to perform additional standing transfer to fix linens in chair  Increased time required for all mobility due to pt being easily distracted by conversation & being [facility], which makes it more difficult to redirect to task  At conclusion of session, pt remained in chair with all needs in reach & chair alarm active  RN notified of pt's position & current mobility status  Will continue to benefit from therapy services to improve ambulatory tolerances, improve endurance, and reduce need for assist with mobilty tasks at this time    Barriers to Discharge: Decreased caregiver support     Recommendation: Post acute IP rehab     PT - OK to Discharge: Yes(to rehab when medically cleared)    See flowsheet documentation for full assessment

## 2020-01-25 NOTE — PROGRESS NOTES
Progress Note - Radha Morillo 1940, 78 y o  male MRN: [id number]    Unit/Bed#: [facility] 604-01 Encounter: [id number]    Primary Care Provider: Sunil Champion DO   Date and time admitted to hospital: 1/21/2020 11:26 AM        Type 2 diabetes mellitus, without long-term current use of insulin [facility])  Assessment & Plan  Lab Results   Component Value Date    HGBA1C 5 5 01/22/2020       Recent Labs     01/24/20  1211 01/24/20  1708 01/24/20  2056 01/25/20  0809   POCGLU 256* 104 217* 107       Blood Sugar Average: Last 72 hrs:  (P) 795 [id number]    Chronic combined systolic (congestive) and diastolic (congestive) heart failure (HCC)  Assessment & Plan  Wt Readings from Last 3 Encounters:   01/22/20 95 3 kg (210 lb 1 6 oz)   01/21/20 97 2 kg (214 lb 6 oz)   09/10/18 95 kg (209 lb 7 oz)             Pneumonia involving right lung  Assessment & Plan  - Right-sided pneumonia, present on admission   - Continue current antibiotic regimen with plan to complete 5 day course of antibiotics  - Monitor respiratory status  - Encourage incentive spirometer use and adequate pulmonary hygiene  - Supplemental oxygen as needed only to maintain saturations greater than equal to 94%  Encephalopathy  Assessment & Plan  - Acute encephalopathy likely multifactorial in setting of TBI, hypoglycemia, and pneumonia  Encephalopathy is improving/resolving   - Delirium precautions  - [address] [facility] evaluation and recommendations  - Continue treatment/management of associated medical/trauma conditions  Hypoglycemia  Assessment & Plan  - History of type 2 diabetes mellitus with use of oral medications  No history long-term insulin use  Patient was noted to be hypoglycemic in the pre-hospital setting as well as having episodes of hypoglycemia since admission   - Appreciate Endocrinology evaluation and recommendations  - Continue to hold all oral medications    - Continue subcutaneous insulin regimen and adjust regimen per Endocrinology recommendations  - Will need outpatient follow-up with primary care provider  * Subdural hematoma (HCC)  Assessment & Plan  Subdural hematoma likely secondary to a fall with possible association to acquired coagulopathy and platelet dysfunction secondary to Xarelto, aspirin and Plavix use  Patient administered Kcentra on admission   - Appreciate Neurosurgery evaluation and recommendations  Non operative management recommended  - Repeat CT head on 01/22/2020 was reviewed and appears stable from prior   - Cleared by Neurosurgery to resume pharmacologic DVT prophylaxis  - PT and OT evaluation and treatment as indicated  - Symptomatic management as indicated  Bedside rounds completed with nurse Anne Marie       Disposition: home vs rehab      SUBJECTIVE:  Chief Complaint: sore    Subjective: I need new ice bags      OBJECTIVE:     Meds/Allergies     Current Facility-Administered Medications:     acetaminophen (TYLENOL) tablet 650 mg, 650 mg, Oral, Q6H PRN, EDDIE Guy-SOL, 650 mg at 01/24/20 0441    allopurinol (ZYLOPRIM) tablet 100 mg, 100 mg, Oral, Daily, EDDIE Guy-C, 100 mg at 01/25/20 9727    atorvastatin (LIPITOR) tablet 10 mg, 10 mg, Oral, Daily, EDDIE Guy-C, 10 mg at 01/25/20 0831    azithromycin (ZITHROMAX) 500 mg in sodium chloride 0 9% 250mL IVPB 500 mg, 500 mg, Intravenous, Q24H, Eitan Jerry MD, Last Rate: 250 mL/hr at 01/23/20 2239, 500 mg at 01/24/20 2115    cefTRIAXone (ROCEPHIN) 2,000 mg in dextrose 5 % 50 mL IVPB, 2,000 mg, Intravenous, Q24H, Eitan Jerry MD, Last Rate: 100 mL/hr at 01/24/20 1643, 2,000 mg at 01/24/20 1643    dextrose 50 % IV solution 25 mL, 25 mL, Intravenous, PRN, Natalio Pappas MD    finasteride (PROSCAR) tablet 5 mg, 5 mg, Oral, Daily, EDDIE Gyu-SOL, 5 mg at 01/25/20 0765    heparin (porcine) subcutaneous injection 5,000 Units, 5,000 Units, Subcutaneous, Q8H [address], Natalio Pappas MD, 5,000 Units at 01/25/20 0519    insulin glargine (LANTUS) subcutaneous injection 12 Units 0 12 mL, 12 Units, Subcutaneous, HS, [facility], 12 Units at 01/24/20 2114    insulin lispro (HumaLOG) 100 units/mL subcutaneous injection 1-5 Units, 1-5 Units, Subcutaneous, HS, Louisecorrie Hayes, PA-C, 1 Units at 01/24/20 2115    insulin lispro (HumaLOG) 100 units/mL subcutaneous injection 1-5 Units, 1-5 Units, Subcutaneous, TID AC, [facility], Stopped at 01/25/20 6793    levETIRAcetam (KEPPRA) tablet 500 mg, 500 mg, Oral, Q12H [facility], Maldonado Bolanos PA-C, 500 mg at 01/25/20 6860    oxyCODONE (ROXICODONE) IR tablet 2 5 mg, 2 5 mg, Oral, Q4H PRN, Maldonado Bolanos PA-C    oxyCODONE (ROXICODONE) IR tablet 5 mg, 5 mg, Oral, Q4H PRN, Maldonado Bolanos PA-C    pantoprazole (PROTONIX) EC tablet 20 mg, 20 mg, Oral, Early Morning, Maldonado Bolanos PA-C, 20 mg at 01/25/20 0518    sertraline (ZOLOFT) tablet 50 mg, 50 mg, Oral, Daily, Maldonado Bolanos PA-C, 50 mg at 01/25/20 0831     Vitals:   Vitals:    01/25/20 0619   BP: 149/82   Pulse: 61   Resp: 18   Temp:    SpO2: 92%       Intake/Output:  I/O       01/23 0701 - 01/24 0700 01/24 0701 - 01/25 0700 01/25 0701 - 01/26 0700    P  O  840 720     Total Intake(mL/kg) 840 (8 8) 720 (7 6)     Urine (mL/kg/hr) 700 (0 3) 200 (0 1)     Stool 0      Total Output 700 200     Net +140 +520            Unmeasured Urine Occurrence 2 x 2 x     Unmeasured Stool Occurrence 2 x 1 x            Nutrition/GI Proph/Bowel Reg: regular    Physical Exam:   GENERAL APPEARANCE: resting in bed  NEURO: intact,  GCS - 14, does go off on certain topics and rambles  HEENT: EOM's intact  CV: RRR< no complaint of chest pain  LUNGS: CTA bilaterally, no shortness of breath  GI: tolerating a diet  : voiding  MSK: moves all four extremities  SKIN: warm and dry    Invasive Devices     Peripheral Intravenous Line            Peripheral IV 01/23/20 Right;Ventral (anterior) Forearm 2 days                 Lab Results: Results for Retana, Cassius Don (MRN [id number]) as of 1/25/2020 12:34   Ref   Range 1/25/2020 08:09   POC Glucose Latest Ref Range: 65 - 140 mg/dl 107     Imaging/EKG Studies: none  Other Studies: none  VTE Prophylaxis: Sequential compression device (Venodyne)

## 2020-01-26 LAB
GLUCOSE SERPL-MCNC: 165 MG/DL (ref 65–140)
GLUCOSE SERPL-MCNC: 196 MG/DL (ref 65–140)
GLUCOSE SERPL-MCNC: 266 MG/DL (ref 65–140)
GLUCOSE SERPL-MCNC: 80 MG/DL (ref 65–140)

## 2020-01-26 PROCEDURE — 99232 SBSQ HOSP IP/OBS MODERATE 35: CPT | Performed by: SURGERY

## 2020-01-26 PROCEDURE — 97530 THERAPEUTIC ACTIVITIES: CPT

## 2020-01-26 PROCEDURE — 82948 REAGENT STRIP/BLOOD GLUCOSE: CPT

## 2020-01-26 PROCEDURE — 97116 GAIT TRAINING THERAPY: CPT

## 2020-01-26 PROCEDURE — 97110 THERAPEUTIC EXERCISES: CPT

## 2020-01-26 PROCEDURE — 97535 SELF CARE MNGMENT TRAINING: CPT

## 2020-01-26 RX ADMIN — SERTRALINE HYDROCHLORIDE 50 MG: 50 TABLET ORAL at 08:34

## 2020-01-26 RX ADMIN — ALLOPURINOL 100 MG: 100 TABLET ORAL at 08:36

## 2020-01-26 RX ADMIN — HEPARIN SODIUM 5000 UNITS: 5000 INJECTION INTRAVENOUS; SUBCUTANEOUS at 05:46

## 2020-01-26 RX ADMIN — INSULIN GLARGINE 12 UNITS: 100 INJECTION, SOLUTION SUBCUTANEOUS at 21:05

## 2020-01-26 RX ADMIN — HEPARIN SODIUM 5000 UNITS: 5000 INJECTION INTRAVENOUS; SUBCUTANEOUS at 21:05

## 2020-01-26 RX ADMIN — HEPARIN SODIUM 5000 UNITS: 5000 INJECTION INTRAVENOUS; SUBCUTANEOUS at 13:00

## 2020-01-26 RX ADMIN — LEVETIRACETAM 500 MG: 500 TABLET, FILM COATED ORAL at 21:06

## 2020-01-26 RX ADMIN — FINASTERIDE 5 MG: 5 TABLET, FILM COATED ORAL at 08:34

## 2020-01-26 RX ADMIN — CEFTRIAXONE 2000 MG: 2 INJECTION, POWDER, FOR SOLUTION INTRAMUSCULAR; INTRAVENOUS at 16:24

## 2020-01-26 RX ADMIN — LEVETIRACETAM 500 MG: 500 TABLET, FILM COATED ORAL at 08:34

## 2020-01-26 RX ADMIN — ATORVASTATIN CALCIUM 10 MG: 10 TABLET, FILM COATED ORAL at 08:34

## 2020-01-26 RX ADMIN — INSULIN LISPRO 1 UNITS: 100 INJECTION, SOLUTION INTRAVENOUS; SUBCUTANEOUS at 18:27

## 2020-01-26 RX ADMIN — INSULIN LISPRO 1 UNITS: 100 INJECTION, SOLUTION INTRAVENOUS; SUBCUTANEOUS at 21:06

## 2020-01-26 RX ADMIN — PANTOPRAZOLE SODIUM 20 MG: 20 TABLET, DELAYED RELEASE ORAL at 05:46

## 2020-01-26 RX ADMIN — INSULIN LISPRO 2 UNITS: 100 INJECTION, SOLUTION INTRAVENOUS; SUBCUTANEOUS at 12:56

## 2020-01-26 NOTE — PLAN OF CARE
Problem: PHYSICAL THERAPY ADULT  Goal: Performs mobility at highest level of function for planned discharge setting  See evaluation for individualized goals  Description  Treatment/Interventions: OT, Spoke to case management, Spoke to nursing, Gait training, Bed mobility, Patient/family training, Endurance training, LE strengthening/ROM, Functional transfer training  Equipment Recommended: (pt reports having RW)       See flowsheet documentation for full assessment, interventions and recommendations  Outcome: Progressing  Note:   Prognosis: Good  Problem List: Decreased strength, Decreased range of motion, Decreased endurance, Decreased mobility, Decreased cognition, Decreased coordination, Impaired judgement, Pain  Assessment: Patient lying supine in bed, agreeable to participate in therapy  He demonstates improved mobility, resistant to walking due to increase knee pain, however once educated, he did ambulate beyond household distances  Patient tolerated ambulation well and was able to transfer with S to [place]  He requires standing rest breaks throughout mobility  He is progressing slowly and would continue to benefit from skilled PT to maximize functional independence  Barriers to Discharge: Decreased caregiver support     Recommendation: Post acute IP rehab     PT - OK to Discharge: Yes(when medically stable)    See flowsheet documentation for full assessment

## 2020-01-26 NOTE — PLAN OF CARE
Problem: OCCUPATIONAL THERAPY ADULT  Goal: Performs self-care activities at highest level of function for planned discharge setting  See evaluation for individualized goals  Description  Treatment Interventions: ADL retraining, Functional transfer training, Endurance training, Cognitive reorientation, Patient/family training, Equipment evaluation/education, Compensatory technique education, Activityengagement          See flowsheet documentation for full assessment, interventions and recommendations  Outcome: Progressing  Note:   Limitation: Decreased ADL status, Decreased Safe judgement during ADL, Decreased cognition, Decreased endurance, Decreased self-care trans, Decreased high-level ADLs  Prognosis: Good, Fair  Assessment: pt was seen for pm ot session focusing on activity tolerance and transfers  fair+ balance x 10 min for woodworking tabletop activity  limited by reported pain in l knee  overall supervision for bed mobility, min a for transfers  walked short distance to w/c s device       OT Discharge Recommendation: Short Term Rehab  OT - OK to Discharge: Yes  Sukumar Giron

## 2020-01-26 NOTE — SOCIAL WORK
Patient accepted to [address] [place] [address] for rehab  Patient needs insurance authorization  CM will continue to follow

## 2020-01-26 NOTE — ASSESSMENT & PLAN NOTE
Lab Results   Component Value Date    HGBA1C 5 5 01/22/2020       Recent Labs     01/25/20  1638 01/25/20  2113 01/26/20  0802 01/26/20  1126   POCGLU 215* 162* 80 266*       Blood Sugar Average: Last 72 hrs:  (P) 162 6

## 2020-01-26 NOTE — OCCUPATIONAL THERAPY NOTE
Occupational Therapy Treatment Note:     01/26/20 1500   Restrictions/Precautions   Weight Bearing Precautions Per Order No   Other Precautions Cognitive; Bed Alarm; Fall Risk   Pain Assessment   Pain Assessment FLACC   Pain Rating: FLACC (Rest) - Face 0   Pain Rating: FLACC (Rest) - Legs 0   Pain Rating: FLACC (Rest) - Activity 0   Pain Rating: FLACC (Rest) - Cry 1   Pain Rating: FLACC (Rest) - Consolability 0   Score: FLACC (Rest) 1   Pain Rating: FLACC (Activity) - Face 1   Pain Rating: FLACC (Activity) - Legs 0   Pain Rating: FLACC (Activity) - Activity 0   Pain Rating: FLACC (Activity) - Cry 1   Pain Rating: FLACC (Activity) - Consolability 0   Score: FLACC (Activity) 2   ADL   Toileting Assistance  4  Minimal Assistance  (pulled pants over hips)   Toileting Comments in stance, no device   Functional Standing Tolerance   Activity fair+ balance x 10 min for woodworking tabletop activity   Comments no device   Bed Mobility   Supine to Sit 5  Supervision   Additional items Bedrails;HOB elevated; Increased time required   Sit to Supine 5  Supervision   Additional items Increased time required   Transfers   Sit to Stand 4  Minimal assistance   Additional items Verbal cues   Stand to Sit 4  Minimal assistance   Additional items Verbal cues   Additional Comments x2 from bed and w/c   Functional Mobility   Functional Mobility 4  Minimal assistance   Additional Comments walked short distance to w/c, declined rw   Cognition   Overall Cognitive Status Impaired   Arousal/Participation Alert   Attention Attends with cues to redirect   Memory Decreased recall of recent events;Decreased recall of precautions   Following Commands Follows one step commands with increased time or repetition   Comments pt required encouragement to participate but engaged with activity once begun     Activity Tolerance   Activity Tolerance Patient limited by pain   Assessment   Assessment pt was seen for pm ot session focusing on activity tolerance and transfers  fair+ balance x 10 min for woodworking tabletop activity  limited by reported pain in l knee  overall supervision for bed mobility, min a for transfers  walked short distance to w/c s device  Plan   Treatment Interventions ADL retraining;Functional transfer training; Endurance training;Cognitive reorientation;Patient/family training;Equipment evaluation/education; Compensatory technique education; Activityengagement   Goal Expiration Date 01/31/20   OT Treatment Day 2   OT Frequency 3-5x/wk   Recommendation   OT Discharge Recommendation Short Term Rehab   OT - OK to Discharge Yes   Tai Lopez

## 2020-01-26 NOTE — PHYSICAL THERAPY NOTE
Physical Therapy Progress Note        01/26/20 0924   Pain Assessment   Pain Assessment 0-10   Pain Score 6   Pain Type Chronic pain   Pain Location Knee   Pain Orientation Left   Hospital Pain Intervention(s) Ambulation/increased activity;Repositioned   Response to Interventions Tolerated   Restrictions/Precautions   Weight Bearing Precautions Per Order No   Other Precautions Bed Alarm;Cognitive; Fall Risk   General   Chart Reviewed Yes   Response to Previous Treatment Patient with no complaints from previous session  Family/Caregiver Present No   Cognition   Overall Cognitive Status Impaired   Arousal/Participation Alert   Comments Patient requires encouragment to participate, but agreeable following education   Subjective   Subjective "I want to get out of here"   Bed Mobility   Supine to Sit 5  Supervision   Additional items Assist x 1; Increased time required   Sit to Supine 5  Supervision   Additional items Assist x 1; Increased time required   Transfers   Sit to Stand 4  Minimal assistance   Additional items Assist x 1; Increased time required;Verbal cues   Stand to Sit 4  Minimal assistance   Additional items Assist x 1; Increased time required;Verbal cues   Ambulation/Elevation   Gait pattern Excessively slow; Step to; Inconsistent martha;Decreased foot clearance   Gait Assistance 4  Minimal assist   Additional items Assist x 1;Verbal cues   Assistive Device Rolling walker   Distance 360 feet   Balance   Static Sitting Fair +   Dynamic Sitting Fair   Static Standing Fair -   Dynamic Standing Poor +   Endurance Deficit   Endurance Deficit Yes   Activity Tolerance   Activity Tolerance Patient tolerated treatment well;Patient limited by pain   Nurse Made Aware Appropriate to see per RN   Exercises   Hip Flexion Sitting;15 reps;AROM; Right   Hip Abduction Sitting;15 reps;AROM; Bilateral   Knee AROM Long Arc Quad Sitting;15 reps;AROM; Bilateral   Assessment   Prognosis Good   Problem List Decreased strength;Decreased range of motion;Decreased endurance;Decreased mobility; Decreased cognition;Decreased coordination; Impaired judgement;Pain   Assessment Patient lying supine in bed, agreeable to participate in therapy  He demonstates improved mobility, resistant to walking due to increase knee pain, however once educated, he did ambulate beyond household distances  Patient tolerated ambulation well and was able to transfer with S to [place]  He requires standing rest breaks throughout mobility  He is progressing slowly and would continue to benefit from skilled PT to maximize functional independence  Barriers to Discharge Decreased caregiver support   Goals   Patient Goals For his pain to stop   STG Expiration Date 02/02/20   PT Treatment Day 3   Plan   Treatment/Interventions Functional transfer training;LE strengthening/ROM; Therapeutic exercise; Endurance training;Bed mobility;Gait training;Spoke to nursing   Progress Progressing toward goals   PT Frequency   (3-5x a week)   Recommendation   Recommendation Post acute IP rehab   Equipment Recommended Walker  (RW)   PT - OK to Discharge Yes  (when medically stable)     Baron Yoandy PTA

## 2020-01-26 NOTE — PLAN OF CARE
Problem: Nutrition/Hydration-ADULT  Goal: Nutrient/Hydration intake appropriate for improving, restoring or maintaining nutritional needs  Description  Monitor and assess patient's nutrition/hydration status for malnutrition  Collaborate with interdisciplinary team and initiate plan and interventions as ordered  Monitor patient's weight and dietary intake as ordered or per policy  Utilize nutrition screening tool and intervene as necessary  Determine patient's food preferences and provide high-protein, high-caloric foods as appropriate       INTERVENTIONS:  - Monitor oral intake, urinary output, labs, and treatment plans  - Assess nutrition and hydration status and recommend course of action  - Evaluate amount of meals eaten  - Assist patient with eating if necessary   - Allow adequate time for meals  - Recommend/ encourage appropriate diets, oral nutritional supplements, and vitamin/mineral supplements  - Order, calculate, and assess calorie counts as needed  - Recommend, monitor, and adjust tube feedings and TPN/PPN based on assessed needs  - Assess need for intravenous fluids  - Provide specific nutrition/hydration education as appropriate  - Include patient/family/caregiver in decisions related to nutrition  Outcome: Progressing     Problem: Prexisting or High Potential for Compromised Skin Integrity  Goal: Skin integrity is maintained or improved  Description  INTERVENTIONS:  - Identify patients at risk for skin breakdown  - Assess and monitor skin integrity  - Assess and monitor nutrition and hydration status  - Monitor labs   - Assess for incontinence   - Turn and reposition patient  - Assist with mobility/ambulation  - Relieve pressure over bony prominences  - Avoid friction and shearing  - Provide appropriate hygiene as needed including keeping skin clean and dry  - Evaluate need for skin moisturizer/barrier cream  - Collaborate with interdisciplinary team   - Patient/family teaching  - Consider wound care consult   Outcome: Progressing     Problem: Potential for Falls  Goal: Patient will remain free of falls  Description  INTERVENTIONS:  - Assess patient frequently for physical needs  -  Identify cognitive and physical deficits and behaviors that affect risk of falls    -  [place] fall precautions as indicated by assessment   - Educate patient/family on patient safety including physical limitations  - Instruct patient to call for assistance with activity based on assessment  - Modify environment to reduce risk of injury  - Consider OT/PT consult to assist with strengthening/mobility  Outcome: Progressing     Problem: PAIN - ADULT  Goal: Verbalizes/displays adequate comfort level or baseline comfort level  Description  Interventions:  - Encourage patient to monitor pain and request assistance  - Assess pain using appropriate pain scale  - Administer analgesics based on type and severity of pain and evaluate response  - Implement non-pharmacological measures as appropriate and evaluate response  - Consider cultural and social influences on pain and pain management  - Notify physician/advanced practitioner if interventions unsuccessful or patient reports new pain  Outcome: Progressing     Problem: INFECTION - ADULT  Goal: Absence or prevention of progression during hospitalization  Description  INTERVENTIONS:  - Assess and monitor for signs and symptoms of infection  - Monitor lab/diagnostic results  - Monitor all insertion sites, i e  indwelling lines, tubes, and drains  - Monitor endotracheal if appropriate and nasal secretions for changes in amount and color  - [place] appropriate cooling/warming therapies per order  - Administer medications as ordered  - Instruct and encourage patient and family to use good hand hygiene technique  - Identify and instruct in appropriate isolation precautions for identified infection/condition  Outcome: Progressing  Goal: Absence of fever/infection during neutropenic period  Description  INTERVENTIONS:  - Monitor WBC    Outcome: Progressing     Problem: SAFETY ADULT  Goal: Maintain or return to baseline ADL function  Description  INTERVENTIONS:  -  Assess patient's ability to carry out ADLs; assess patient's baseline for ADL function and identify physical deficits which impact ability to perform ADLs (bathing, care of mouth/teeth, toileting, grooming, dressing, etc )  - Assess/evaluate cause of self-care deficits   - Assess range of motion  - Assess patient's mobility; develop plan if impaired  - Assess patient's need for assistive devices and provide as appropriate  - Encourage maximum independence but intervene and supervise when necessary  - Involve family in performance of ADLs  - Assess for home care needs following discharge   - Consider OT consult to assist with ADL evaluation and planning for discharge  - Provide patient education as appropriate  Outcome: Progressing  Goal: Maintain or return mobility status to optimal level  Description  INTERVENTIONS:  - Assess patient's baseline mobility status (ambulation, transfers, stairs, etc )    - Identify cognitive and physical deficits and behaviors that affect mobility  - Identify mobility aids required to assist with transfers and/or ambulation (gait belt, sit-to-stand, lift, walker, cane, etc )  - [place] fall precautions as indicated by assessment  - Record patient progress and toleration of activity level on Mobility SBAR; progress patient to next Phase/Stage  - Instruct patient to call for assistance with activity based on assessment  - Consider rehabilitation consult to assist with strengthening/weightbearing, etc   Outcome: Progressing     Problem: DISCHARGE PLANNING  Goal: Discharge to home or other facility with appropriate resources  Description  INTERVENTIONS:  - Identify barriers to discharge w/patient and caregiver  - Arrange for needed discharge resources and transportation as appropriate  - Identify discharge learning needs (meds, wound care, etc )  - Arrange for interpretive services to assist at discharge as needed  - Refer to Case Management Department for coordinating discharge planning if the patient needs post-hospital services based on physician/advanced practitioner order or complex needs related to functional status, cognitive ability, or social support system  Outcome: Progressing     Problem: Knowledge Deficit  Goal: Patient/family/caregiver demonstrates understanding of disease process, treatment plan, medications, and discharge instructions  Description  Complete learning assessment and assess knowledge base    Interventions:  - Provide teaching at level of understanding  - Provide teaching via preferred learning methods  Outcome: Progressing

## 2020-01-26 NOTE — PROGRESS NOTES
Progress Note - Delilah Flor 1940, 78 y o  male MRN: [id number]    Unit/Bed#: [facility] 604-01 Encounter: [id number]    Primary Care Provider: Khoa Andrews DO   Date and time admitted to hospital: 1/21/2020 11:26 AM        Type 2 diabetes mellitus, without long-term current use of insulin [facility])  Assessment & Plan  Lab Results   Component Value Date    HGBA1C 5 5 01/22/2020       Recent Labs     01/25/20  1638 01/25/20  2113 01/26/20  0802 01/26/20  1126   POCGLU 215* 162* 80 266*       Blood Sugar Average: Last 72 hrs:  (P) 162 6    Pneumonia involving right lung  Assessment & Plan  - Right-sided pneumonia, present on admission   - Continue current antibiotic regimen with plan to complete 5 day course of antibiotics  - Monitor respiratory status  - Encourage incentive spirometer use and adequate pulmonary hygiene  - Supplemental oxygen as needed only to maintain saturations greater than equal to 94%  Encephalopathy  Assessment & Plan  - Acute encephalopathy likely multifactorial in setting of TBI, hypoglycemia, and pneumonia  Encephalopathy is improving/resolving   - Delirium precautions  - [address] [facility] evaluation and recommendations  - Continue treatment/management of associated medical/trauma conditions  Hypoglycemia  Assessment & Plan  - History of type 2 diabetes mellitus with use of oral medications  No history long-term insulin use  Patient was noted to be hypoglycemic in the pre-hospital setting as well as having episodes of hypoglycemia since admission   - Appreciate Endocrinology evaluation and recommendations  - Continue to hold all oral medications  - Continue subcutaneous insulin regimen and adjust regimen per Endocrinology recommendations  - Will need outpatient follow-up with primary care provider      * Subdural hematoma (HCC)  Assessment & Plan  Subdural hematoma likely secondary to a fall with possible association to acquired coagulopathy and platelet dysfunction secondary to Xarelto, aspirin and Plavix use  Patient administered Kcentra on admission   - Appreciate Neurosurgery evaluation and recommendations  Non operative management recommended  - Repeat CT head on 01/22/2020 was reviewed and appears stable from prior   - Cleared by Neurosurgery to resume pharmacologic DVT prophylaxis  - PT and OT evaluation and treatment as indicated  - Symptomatic management as indicated  Bedside rounds completed with nurse Anne Marie       Disposition: rehab      SUBJECTIVE:  Chief Complaint: none    Subjective: I don't like this food      OBJECTIVE:     Meds/Allergies     Current Facility-Administered Medications:     acetaminophen (TYLENOL) tablet 650 mg, 650 mg, Oral, Q6H PRN, Gomez Villareal PA-C, 650 mg at 01/24/20 0441    allopurinol (ZYLOPRIM) tablet 100 mg, 100 mg, Oral, Daily, Gomez Villareal PA-C, 100 mg at 01/26/20 9495    atorvastatin (LIPITOR) tablet 10 mg, 10 mg, Oral, Daily, Gomez Villareal PA-C, 10 mg at 01/26/20 7323    cefTRIAXone (ROCEPHIN) 2,000 mg in dextrose 5 % 50 mL IVPB, 2,000 mg, Intravenous, Q24H, Huy York MD, Stopped at 01/25/20 1732    dextrose 50 % IV solution 25 mL, 25 mL, Intravenous, PRN, Trever Pacheco MD    diclofenac sodium (VOLTAREN) 1 % topical gel 2 g, 2 g, Topical, 4x Daily, LOVE Dennis, 2 g at 01/25/20 2142    finasteride (PROSCAR) tablet 5 mg, 5 mg, Oral, Daily, Gomez Villareal PA-C, 5 mg at 01/26/20 2973    heparin (porcine) subcutaneous injection 5,000 Units, 5,000 Units, Subcutaneous, Q8H [address], Trever Pacheco MD, 5,000 Units at 01/26/20 1300    insulin glargine (LANTUS) subcutaneous injection 12 Units 0 12 mL, 12 Units, Subcutaneous, HS, Maggi Snider DO, 12 Units at 01/25/20 2142    insulin lispro (HumaLOG) 100 units/mL subcutaneous injection 1-5 Units, 1-5 Units, Subcutaneous, HS, Randy De Luna PA-C, 1 Units at 01/25/20 2142    insulin lispro (HumaLOG) 100 units/mL subcutaneous injection 1-5 Units, 1-5 Units, Subcutaneous, TID AC, Mace Sharon, DO, 2 Units at 01/26/20 1256    levETIRAcetam (KEPPRA) tablet 500 mg, 500 mg, Oral, Q12H [address], Melvin Groom, PA-C, 500 mg at 01/26/20 8683    oxyCODONE (ROXICODONE) IR tablet 2 5 mg, 2 5 mg, Oral, Q4H PRN, Melvin Groom, PA-C    oxyCODONE (ROXICODONE) IR tablet 5 mg, 5 mg, Oral, Q4H PRN, Melvin Groom, PA-C, 5 mg at 01/25/20 1156    pantoprazole (PROTONIX) EC tablet 20 mg, 20 mg, Oral, Early Morning, Melvin Groom, PA-C, 20 mg at 01/26/20 0546    sertraline (ZOLOFT) tablet 50 mg, 50 mg, Oral, Daily, Melvin Groom, PA-C, 50 mg at 01/26/20 8890     Vitals:   Vitals:    01/26/20 0651   BP: 153/82   Pulse: 58   Resp: 18   Temp: 97 7 °F (36 5 °C)   SpO2: (!) 88%       Intake/Output:  I/O       01/24 0701 - 01/25 0700 01/25 0701 - 01/26 0700 01/26 0701 - 01/27 0700    P  O  720 616 420    IV Piggyback  300     Total Intake(mL/kg) 720 (7 6) 916 (9 6) 420 (4 4)    Urine (mL/kg/hr) 200 (0 1) 150 (0 1)     Stool  0     Total Output 200 150     Net +520 +766 +420           Unmeasured Urine Occurrence 2 x 1 x 2 x    Unmeasured Stool Occurrence 1 x 1 x 2 x           Nutrition/GI Proph/Bowel Reg: regular    Physical Exam:   GENERAL APPEARANCE: comfortable  NEURO: GCS - 15, intact  HEENT: EOm's intact  CV: RRR, no complaint of chest pain  LUNGS: CTA bilaterally, no shortness of breath  GI: tolerating a diet, no further nausea  : voiding  MSK: moving all four extremities  SKIN: warm and dry    Invasive Devices     Peripheral Intravenous Line            Peripheral IV 01/23/20 Right;Ventral (anterior) Forearm 3 days                 Lab Results: Results for Pauletet Petit (MRN [id number]) as of 1/26/2020 14:04   Ref   Range 1/26/2020 11:26   POC Glucose Latest Ref Range: 65 - 140 mg/dl 266 (H)     Imaging/EKG Studies: none  Other Studies: none  VTE Prophylaxis: Sequential compression device (Venodyne)  and Heparin

## 2020-01-27 VITALS
OXYGEN SATURATION: 92 % | TEMPERATURE: 98.2 F | RESPIRATION RATE: 18 BRPM | HEART RATE: 60 BPM | DIASTOLIC BLOOD PRESSURE: 77 MMHG | SYSTOLIC BLOOD PRESSURE: 155 MMHG | BODY MASS INDEX: 31.84 KG/M2 | HEIGHT: 68 IN | WEIGHT: 210.1 LBS

## 2020-01-27 LAB
GLUCOSE SERPL-MCNC: 123 MG/DL (ref 65–140)
GLUCOSE SERPL-MCNC: 270 MG/DL (ref 65–140)

## 2020-01-27 PROCEDURE — 99238 HOSP IP/OBS DSCHRG MGMT 30/<: CPT | Performed by: NURSE PRACTITIONER

## 2020-01-27 PROCEDURE — 82948 REAGENT STRIP/BLOOD GLUCOSE: CPT

## 2020-01-27 RX ORDER — OXYCODONE HYDROCHLORIDE 5 MG/1
2.5 TABLET ORAL EVERY 4 HOURS PRN
Qty: 30 TABLET | Refills: 0 | Status: SHIPPED | OUTPATIENT
Start: 2020-01-27 | End: 2020-02-06

## 2020-01-27 RX ORDER — ACETAMINOPHEN 325 MG/1
650 TABLET ORAL EVERY 6 HOURS PRN
Qty: 30 TABLET | Refills: 0 | Status: SHIPPED | OUTPATIENT
Start: 2020-01-27

## 2020-01-27 RX ORDER — AZITHROMYCIN 250 MG/1
TABLET, FILM COATED ORAL
Qty: 2 TABLET | Refills: 0 | Status: SHIPPED | OUTPATIENT
Start: 2020-01-27 | End: 2020-01-31

## 2020-01-27 RX ORDER — HEPARIN SODIUM 5000 [USP'U]/ML
5000 INJECTION, SOLUTION INTRAVENOUS; SUBCUTANEOUS EVERY 8 HOURS SCHEDULED
Qty: 1 ML | Refills: 0 | Status: SHIPPED | OUTPATIENT
Start: 2020-01-27

## 2020-01-27 RX ADMIN — SERTRALINE HYDROCHLORIDE 50 MG: 50 TABLET ORAL at 08:24

## 2020-01-27 RX ADMIN — ALLOPURINOL 100 MG: 100 TABLET ORAL at 08:24

## 2020-01-27 RX ADMIN — HEPARIN SODIUM 5000 UNITS: 5000 INJECTION INTRAVENOUS; SUBCUTANEOUS at 05:55

## 2020-01-27 RX ADMIN — LEVETIRACETAM 500 MG: 500 TABLET, FILM COATED ORAL at 08:24

## 2020-01-27 RX ADMIN — HEPARIN SODIUM 5000 UNITS: 5000 INJECTION INTRAVENOUS; SUBCUTANEOUS at 13:36

## 2020-01-27 RX ADMIN — INSULIN LISPRO 2 UNITS: 100 INJECTION, SOLUTION INTRAVENOUS; SUBCUTANEOUS at 12:11

## 2020-01-27 RX ADMIN — PANTOPRAZOLE SODIUM 20 MG: 20 TABLET, DELAYED RELEASE ORAL at 05:56

## 2020-01-27 RX ADMIN — FINASTERIDE 5 MG: 5 TABLET, FILM COATED ORAL at 08:24

## 2020-01-27 RX ADMIN — ATORVASTATIN CALCIUM 10 MG: 10 TABLET, FILM COATED ORAL at 08:24

## 2020-01-27 NOTE — SOCIAL WORK
auth obtained  # [id number]  Level 1  1/30/20  Alisha   [phone] phone  [phone] fax     Pt can d/c to rehab today

## 2020-01-27 NOTE — SOCIAL WORK
Pt will d/c to [address] today @1500 via [facility] wheelchair Kathy Mahoney  CM informed pt's wife as well as pt's nurse

## 2020-01-27 NOTE — DISCHARGE SUMMARY
Discharge Summary - Valente Christian 78 y o  male MRN: [id number]    Unit/Bed#: [facility] 114-13 Encounter: [id number]    Admission Date:   Admission Orders (From admission, onward)     Ordered        01/21/20 1222  Inpatient Admission  Once                     Admitting Diagnosis: Injury, unspecified, initial encounter [T14 90XA]  Subdural hematoma ([address] ) [S06 5X9A]    HPI: per resident:  SOL Evangelista:  " Valente Christian is a 78 y o  male who presents as a transfer from [facility] where he was brought by ambulance after wife had difficulty awakening him  On EMS arrival, patient was found to be hypoglycemic and was given D10  Patient awoke and had no complaints  At [facility], patient was febrile and CXR revealed possible pneumonia  Patient also found to have a subdural hematoma and was given K-centra and DDAVP  Patient is a difficult historian and seems confused at this time which is not his baseline  He and his his wife do not recall recent falls  Per the son, patient had told him he fell in the woods about a month ago  "    Procedures Performed: No orders of the defined types were placed in this encounter  Summary of Hospital Course: 79 y/o male admitted to trauma with a SDH  Neurosurgery consulted and seen patient  Patient monitored, worked with physcian therapy and occupational therapy, remains amnestic to mechanism, just requesting to go home, however has periods of confusion  Will follow up with PCP and will follow up with PCP  Dischargeing today to rehab, no trauma follow up required at this time  Significant Findings, Care, Treatment and Services Provided: Xr Chest Pa & Lateral    Result Date: 1/21/2020  Impression: Question increased opacity in the right midlung and in the right posterior costophrenic angle on the lateral projection  If this is a real finding, it could represent pneumonia  The study was marked in [facility] for immediate notification   Workstation performed: [id number]     Ct Head Wo Contrast    Result Date: 1/22/2020  Impression: 1  Compared to prior head CT 1/21/2020, grossly stable left anterior falx and interhemispheric subdural hematoma  No new acute intracranial hemorrhage  2   Sequela of remote infarcts as described  Workstation performed: [id number]     Ct Head Wo Contrast    Result Date: 1/21/2020  Impression: Small acute left frontal parafalcine subdural hematoma  No mass effect or shift  Generalized atrophy and left frontal encephalomalacia appearing stable  I personally discussed this study with Josephine Hayden on 1/21/2020 at 9:12 AM  Workstation performed: [id number]       Complications: none    Discharge Diagnosis: S/P SDH  Hypoglycemia  Encephalopathy  Pneumonia  CAD  CVA  Seizure disorder    Resolved Problems  Date Reviewed: 1/26/2020    None          Condition at Discharge: stable         Discharge instructions/Information to patient and family:   See after visit summary for information provided to patient and family  Provisions for Follow-Up Care:  See after visit summary for information related to follow-up care and any pertinent home health orders  PCP: Raf Cook DO    Disposition: rehab    Planned Readmission: No      Discharge Statement   I spent 28 minutes discharging the patient  This time was spent on the day of discharge  I had direct contact with the patient on the day of discharge  Additional documentation is required if more than 30 minutes were spent on discharge  Discharge Medications:  See after visit summary for reconciled discharge medications provided to patient and family

## 2020-01-27 NOTE — PLAN OF CARE
Problem: Nutrition/Hydration-ADULT  Goal: Nutrient/Hydration intake appropriate for improving, restoring or maintaining nutritional needs  Description  Monitor and assess patient's nutrition/hydration status for malnutrition  Collaborate with interdisciplinary team and initiate plan and interventions as ordered  Monitor patient's weight and dietary intake as ordered or per policy  Utilize nutrition screening tool and intervene as necessary  Determine patient's food preferences and provide high-protein, high-caloric foods as appropriate       INTERVENTIONS:  - Monitor oral intake, urinary output, labs, and treatment plans  - Assess nutrition and hydration status and recommend course of action  - Evaluate amount of meals eaten  - Assist patient with eating if necessary   - Allow adequate time for meals  - Recommend/ encourage appropriate diets, oral nutritional supplements, and vitamin/mineral supplements  - Order, calculate, and assess calorie counts as needed  - Recommend, monitor, and adjust tube feedings and TPN/PPN based on assessed needs  - Assess need for intravenous fluids  - Provide specific nutrition/hydration education as appropriate  - Include patient/family/caregiver in decisions related to nutrition  Outcome: Progressing     Problem: Prexisting or High Potential for Compromised Skin Integrity  Goal: Skin integrity is maintained or improved  Description  INTERVENTIONS:  - Identify patients at risk for skin breakdown  - Assess and monitor skin integrity  - Assess and monitor nutrition and hydration status  - Monitor labs   - Assess for incontinence   - Turn and reposition patient  - Assist with mobility/ambulation  - Relieve pressure over bony prominences  - Avoid friction and shearing  - Provide appropriate hygiene as needed including keeping skin clean and dry  - Evaluate need for skin moisturizer/barrier cream  - Collaborate with interdisciplinary team   - Patient/family teaching  - Consider wound care consult   Outcome: Progressing     Problem: Potential for Falls  Goal: Patient will remain free of falls  Description  INTERVENTIONS:  - Assess patient frequently for physical needs  -  Identify cognitive and physical deficits and behaviors that affect risk of falls    -  [place] fall precautions as indicated by assessment   - Educate patient/family on patient safety including physical limitations  - Instruct patient to call for assistance with activity based on assessment  - Modify environment to reduce risk of injury  - Consider OT/PT consult to assist with strengthening/mobility  Outcome: Progressing     Problem: PAIN - ADULT  Goal: Verbalizes/displays adequate comfort level or baseline comfort level  Description  Interventions:  - Encourage patient to monitor pain and request assistance  - Assess pain using appropriate pain scale  - Administer analgesics based on type and severity of pain and evaluate response  - Implement non-pharmacological measures as appropriate and evaluate response  - Consider cultural and social influences on pain and pain management  - Notify physician/advanced practitioner if interventions unsuccessful or patient reports new pain  Outcome: Progressing     Problem: INFECTION - ADULT  Goal: Absence or prevention of progression during hospitalization  Description  INTERVENTIONS:  - Assess and monitor for signs and symptoms of infection  - Monitor lab/diagnostic results  - Monitor all insertion sites, i e  indwelling lines, tubes, and drains  - Monitor endotracheal if appropriate and nasal secretions for changes in amount and color  - [place] appropriate cooling/warming therapies per order  - Administer medications as ordered  - Instruct and encourage patient and family to use good hand hygiene technique  - Identify and instruct in appropriate isolation precautions for identified infection/condition  Outcome: Progressing  Goal: Absence of fever/infection during neutropenic period  Description  INTERVENTIONS:  - Monitor WBC    Outcome: Progressing     Problem: SAFETY ADULT  Goal: Maintain or return to baseline ADL function  Description  INTERVENTIONS:  -  Assess patient's ability to carry out ADLs; assess patient's baseline for ADL function and identify physical deficits which impact ability to perform ADLs (bathing, care of mouth/teeth, toileting, grooming, dressing, etc )  - Assess/evaluate cause of self-care deficits   - Assess range of motion  - Assess patient's mobility; develop plan if impaired  - Assess patient's need for assistive devices and provide as appropriate  - Encourage maximum independence but intervene and supervise when necessary  - Involve family in performance of ADLs  - Assess for home care needs following discharge   - Consider OT consult to assist with ADL evaluation and planning for discharge  - Provide patient education as appropriate  Outcome: Progressing  Goal: Maintain or return mobility status to optimal level  Description  INTERVENTIONS:  - Assess patient's baseline mobility status (ambulation, transfers, stairs, etc )    - Identify cognitive and physical deficits and behaviors that affect mobility  - Identify mobility aids required to assist with transfers and/or ambulation (gait belt, sit-to-stand, lift, walker, cane, etc )  - [place] fall precautions as indicated by assessment  - Record patient progress and toleration of activity level on Mobility SBAR; progress patient to next Phase/Stage  - Instruct patient to call for assistance with activity based on assessment  - Consider rehabilitation consult to assist with strengthening/weightbearing, etc   Outcome: Progressing     Problem: DISCHARGE PLANNING  Goal: Discharge to home or other facility with appropriate resources  Description  INTERVENTIONS:  - Identify barriers to discharge w/patient and caregiver  - Arrange for needed discharge resources and transportation as appropriate  - Identify discharge learning needs (meds, wound care, etc )  - Arrange for interpretive services to assist at discharge as needed  - Refer to Case Management Department for coordinating discharge planning if the patient needs post-hospital services based on physician/advanced practitioner order or complex needs related to functional status, cognitive ability, or social support system  Outcome: Progressing     Problem: Knowledge Deficit  Goal: Patient/family/caregiver demonstrates understanding of disease process, treatment plan, medications, and discharge instructions  Description  Complete learning assessment and assess knowledge base    Interventions:  - Provide teaching at level of understanding  - Provide teaching via preferred learning methods  Outcome: Progressing

## 2020-01-27 NOTE — SOCIAL WORK
Auth submitted with Shankar Hernadez CM faxed to the new [employer] fax @ [phone] with reference # [id number]

## 2020-01-28 ENCOUNTER — TELEPHONE (OUTPATIENT)
Dept: SURGERY | Facility: CLINIC | Age: 80
End: 2020-01-28

## 2020-01-28 NOTE — UTILIZATION REVIEW
Notification of Discharge  This is a Notification of Discharge from our facility [address]  Please be advised that this patient has been discharge from our facility  Below you will find the admission and discharge date and time including the patients disposition  PRESENTATION DATE: 1/21/2020 11:26 AM  OBS ADMISSION DATE:   IP ADMISSION DATE: 1/21/20 1215   DISCHARGE DATE: 1/27/2020  4:35 PM  DISPOSITION: Non [facility] Non [facility]   Admission Orders listed below:  Admission Orders (From admission, onward)     Ordered        01/21/20 1222  Inpatient Admission  Once                   Please contact the UR Department if additional information is required to close this patient's authorization/case  [address] Utilization Review Department  Main: [phone] x carefully listen to the prompts  All voicemails are confidential   [email or web address]  Send all requests for admission clinical reviews, approved or denied determinations and any other requests to dedicated fax number below belonging to the campus where the patient is receiving treatment   List of dedicated fax numbers:  [address] DENIALS (Administrative/Medical Necessity) [phone]   [address]  (Maternity/NICU/Pediatrics) [phone]   Debbie Cazares [phone]   Joseph Rey [phone]   Neal Shelley [phone]   [facility] [address] [place] [address] [phone]   [facility]  [phone]   [address] [facility] [address]  [address] [facility] [address] [facility] [phone]

## 2020-01-28 NOTE — TELEPHONE ENCOUNTER
Steven Evans from [facility] DR MELO CALVILLO called needs clarification of the Heparin ordered for patient  Please call [phone] asap

## 2020-01-29 NOTE — TELEPHONE ENCOUNTER
Called to speak with the pharmacist   Informed them that the subcutaneous heparin appears to be for DVT prophylaxis in the setting of her placement at the facility  It does not appear that she has any long bone fracture  Recommended continuation of medication at facility  I told to pharmacy to cancel the prescription as the facility will monitor this

## 2020-02-11 ENCOUNTER — HOSPITAL ENCOUNTER (OUTPATIENT)
Dept: CT IMAGING | Facility: HOSPITAL | Age: 80
Discharge: HOME | End: 2020-02-11
Payer: COMMERCIAL

## 2020-02-11 DIAGNOSIS — S06.5X9A SUBDURAL HEMATOMA (HCC): ICD-10-CM

## 2020-02-11 PROCEDURE — 70450 CT HEAD/BRAIN W/O DYE: CPT

## 2020-02-13 ENCOUNTER — TELEPHONE (OUTPATIENT)
Dept: NEUROSURGERY | Facility: CLINIC | Age: 80
End: 2020-02-13

## 2020-02-13 ENCOUNTER — OFFICE VISIT (OUTPATIENT)
Dept: NEUROSURGERY | Facility: CLINIC | Age: 80
End: 2020-02-13
Payer: COMMERCIAL

## 2020-02-13 VITALS
HEART RATE: 80 BPM | DIASTOLIC BLOOD PRESSURE: 60 MMHG | HEIGHT: 68 IN | BODY MASS INDEX: 31.83 KG/M2 | SYSTOLIC BLOOD PRESSURE: 126 MMHG | WEIGHT: 210 LBS | RESPIRATION RATE: 18 BRPM | TEMPERATURE: 98.7 F

## 2020-02-13 DIAGNOSIS — S06.5X9A SDH (SUBDURAL HEMATOMA) (HCC): Primary | ICD-10-CM

## 2020-02-13 PROCEDURE — 99214 OFFICE O/P EST MOD 30 MIN: CPT | Performed by: NURSE PRACTITIONER

## 2020-02-13 RX ORDER — OXYCODONE HYDROCHLORIDE 5 MG/1
TABLET ORAL
COMMUNITY
Start: 2020-02-07

## 2020-02-13 RX ORDER — LISINOPRIL 2.5 MG/1
TABLET ORAL
COMMUNITY
Start: 2020-02-02

## 2020-02-13 NOTE — PROGRESS NOTES
Neurosurgery Office Note  Christine Friend 78 y o  male MRN: [id number]      Assessment/Plan     Subdural hematoma [facility])  · Patient presents for 2-week follow up s/p hospitalization for falls and left frontal parafalcine subdural hematoma  · He was on ASA, Plavix and Eliquis for hx CABG/stent/stroke, these were discontinued  · He was discharged to rehab but checked himself out after 3 days  · He was seen by his PCP last week and Xarelto was restarted  · Per his daughter he has fallen 3 times on Saturday on his driveway and continues to fall frequently  · He is at his baseline mental status per his family  · Imaging reviewed by myself and attending as follows:  · CT head:  Stable encephalomalacia sick changes related to remote left MCA infarct  Resolution of left parafalcine subdural on minor right parafalcine subarachnoid hemorrhage  New thin right anterior falx subdural   New 8 mm thick largely hyperdense right frontal subdural   Minimal related mass effect  · Discussed extensively with patient, wife and daughter that reinitiating of Xarelto as well as new trauma is likely what resulted in new subdural hemorrhages  · Discusses extensively that we recommend that he discontinue all AC/AP  Discussed risks and benefits and discussed that this needs to be further discussed with PCP Dr Loree Boston as well as Cardiologist so that family can share in decision making regarding decision to continue  · Further discussed possible surgical interventions for worsening SDH including vilma holes and DHC  Discussed that due to patient's age and comorbidities procedures would carry significant risk and he would likely not recover well  · Patient to return in 4 weeks with repeat CT head  · Patient, wife and daughter verbalized understanding and are in agreement with plan         Diagnoses and all orders for this visit:    SDH (subdural hematoma) ([facility] [address] )  -     CT head without contrast; Future    Other orders  -     oxyCODONE (ROXICODONE) 5 mg immediate release tablet; TAKE 1 TABLET BY MOUTH EVERY 6 HOURS AS NEEDED FOR SEVERE PAIN (PAIN SCORE 7 10) MAX DAILY AMOUNT 20 MG  -     lisinopril (ZESTRIL) 2 5 mg tablet            CHIEF COMPLAINT    Chief Complaint   Patient presents with    Follow-up       HISTORY    History of Present Illness     78y o  year old male with PMH including history of stroke, skin cancer, seizures, hypertension, hyperlipidemia, diabetes and CAD status post CABG on aspirin, Plavix and Xarelto who presented on 1/21/2020 as a transfer for subdural hematoma  At the time, patient endorsed that he had woken on the floor on several occasions over the last week and had difficulty getting up  Patient received reversal agents including DDAVP and Kcentra for use of aspirin, Plavix and Xarelto  Mr Lakeisha Solis was discharged form the hospital on 1/27/2020 to [address] [place] [address] rehab facility but checked himself out after 3 days  He is living at home with his wife  Today he presents with his wife and daughter for review of his repeat CT head  He state he is "not good" because his hips have been causing him significant pain  He denies any headaches or vision changes  His thought process is tangential and he is very hard of hearing  He also refers back frequently to a head injury he sustained when he was 13  Per his daughter and wife he is at his mental baseline with some confusion and aggressive/angry behavior  His wife states she took him to his PCP Dr Prasad Pinto last week and his Xarelto was restarted  His daughter states that this weekend he fell three times in the driveway on the pavement  HPI    See Discussion    REVIEW OF SYSTEMS    Review of Systems   Constitutional: Negative  HENT: Positive for hearing loss  Respiratory: Negative  Cardiovascular: Negative  Gastrointestinal: Negative  Genitourinary: Negative      Musculoskeletal:        Pain in left thigh, severe pain across hips   Psychiatric/Behavioral: Positive for confusion  Meds/Allergies     Current Outpatient Medications   Medication Sig Dispense Refill    allopurinol (ZYLOPRIM) 100 mg tablet Take 100 mg by mouth daily      atorvastatin (LIPITOR) 10 mg tablet Take 40 mg by mouth daily       carvedilol (COREG) 6 25 mg tablet TAKE ONE TABLET BY MOUTH TWICE DAILY WITH FOOD 180 tablet 3    esomeprazole (NexIUM) 20 mg capsule Take 20 mg by mouth every morning before breakfast      finasteride (PROSCAR) 5 mg tablet Take 5 mg by mouth daily      levETIRAcetam (KEPPRA) 500 mg tablet Take 500 mg by mouth every 12 (twelve) hours      metFORMIN (GLUCOPHAGE) 500 mg tablet Take 500 mg by mouth 3 (three) times a day before meals      sertraline (ZOLOFT) 100 mg tablet Take 50 mg by mouth daily      acetaminophen (TYLENOL) 325 mg tablet Take 2 tablets (650 mg total) by mouth every 6 (six) hours as needed for mild pain or fever 30 tablet 0    diclofenac sodium (VOLTAREN) 1 % Apply 2 g topically 4 (four) times a day (Patient not taking: Reported on 2/13/2020) 1 Tube 0    glipiZIDE (GLUCOTROL) 10 mg tablet Take by mouth 2 (two) times a day before meals      Heparin Sodium, Porcine, (HEPARIN, PORCINE,) 5,000 units/mL Inject 1 mL (5,000 Units total) under the skin every 8 (eight) hours (Patient not taking: Reported on 2/13/2020) 1 mL 0    lisinopril (ZESTRIL) 2 5 mg tablet       oxyCODONE (ROXICODONE) 5 mg immediate release tablet TAKE 1 TABLET BY MOUTH EVERY 6 HOURS AS NEEDED FOR SEVERE PAIN (PAIN SCORE 7 10) MAX DAILY AMOUNT 20 MG       No current facility-administered medications for this visit          No Known Allergies    PAST HISTORY    Past Medical History:   Diagnosis Date    Cardiac disease     Diabetes mellitus ([facility] [address] )     Hyperlipidemia     Hypertension     Seizures ([facility] [address] )     Skin cancer     Stroke [facility])        Past Surgical History:   Procedure Laterality Date    CARDIAC SURGERY      bypass 2012       Social History     Tobacco Use    Smoking status: Former Smoker     Last attempt to quit:      Years since quittin 1    Smokeless tobacco: Never Used   Substance Use Topics    Alcohol use: Never     Frequency: Never     Binge frequency: Never    Drug use: Not on file       Family History   Problem Relation Age of Onset    Alzheimer's disease Mother     Stroke Father     Suicide Attempts Son          Above history personally reviewed  EXAM    Vitals:Blood pressure 126/60, pulse 80, temperature 98 7 °F (37 1 °C), temperature source Tympanic, resp  rate 18, height 5' 8" (1 727 m), weight 95 3 kg (210 lb)  ,Body mass index is 31 93 kg/m²  Physical Exam   Constitutional: He is oriented to person, place, and time  He appears well-developed and well-nourished  No distress  Eyes: Pupils are equal, round, and reactive to light  Conjunctivae and EOM are normal  Right eye exhibits no discharge  Left eye exhibits no discharge  Neck: Normal range of motion  Neck supple  Cardiovascular: Normal rate and regular rhythm  Pulmonary/Chest: Effort normal and breath sounds normal  No respiratory distress  Abdominal: Soft  Bowel sounds are normal  He exhibits no distension  There is no tenderness  Musculoskeletal: Normal range of motion  Neurological: He is alert and oriented to person, place, and time  He displays normal reflexes  No cranial nerve deficit or sensory deficit  He exhibits normal muscle tone  He has a normal Finger-Nose-Finger Test  Coordination normal    Reflex Scores:       Tricep reflexes are 1+ on the right side and 1+ on the left side  Bicep reflexes are 1+ on the right side and 1+ on the left side  Brachioradialis reflexes are 1+ on the right side and 1+ on the left side  Patellar reflexes are 1+ on the right side and 1+ on the left side  Achilles reflexes are 1+ on the right side and 1+ on the left side  Skin: Skin is warm and dry  He is not diaphoretic     Psychiatric: He has a normal mood and affect  His speech is normal and behavior is normal  Judgment and thought content normal        Neurologic Exam     Mental Status   Oriented to person, place, and time  Oriented to person  Oriented to place  Oriented to time  Oriented to year, month and date  Registration: recalls 3 of 3 objects  Follows 2 step commands  Attention: normal  Concentration: normal    Speech: speech is normal   Level of consciousness: alert  Knowledge: good and consistent with education  Able to name object  Cranial Nerves     CN III, IV, VI   Pupils are equal, round, and reactive to light  Extraocular motions are normal    Right pupil: Size: 3 mm  Shape: regular  Reactivity: brisk  Consensual response: intact  Accommodation: intact  Left pupil: Size: 3 mm  Shape: regular  Reactivity: brisk  Consensual response: intact  Accommodation: intact  Nystagmus: bilateral   Nystagmus type: rapid and horizontal  Diplopia: none  Conjugate gaze: present    CN V   Right facial sensation deficit: none  Left facial sensation deficit: none    CN VII   Facial expression full, symmetric       CN VIII   Hearing: intact    CN IX, X   Palate: symmetric    CN XI   Right sternocleidomastoid strength: normal  Left sternocleidomastoid strength: normal  Right trapezius strength: normal  Left trapezius strength: normal    CN XII   Tongue: not atrophic  Fasciculations: absent  Tongue deviation: none    Motor Exam   Muscle bulk: normal  Overall muscle tone: normal  Right arm pronator drift: absent  Left arm pronator drift: absent    Strength   Right deltoid: 5/5  Left deltoid: 5/5  Right biceps: 5/5  Left biceps: 5/5  Right triceps: 5/5  Left triceps: 5/5  Right quadriceps: 5/5  Left quadriceps: 5/5  Right hamstrin/5  Left hamstrin/5  Right anterior tibial: 5/5  Left anterior tibial: 5/5  Right posterior tibial: 5/5  Left posterior tibial: 5/5  Right peroneal: 5/5  Left peroneal: 5/5  Right gastroc: 5/5  Left gastroc: 5/5    Sensory Exam Light touch normal    Proprioception normal      Gait, Coordination, and Reflexes     Coordination   Finger to nose coordination: normal    Tremor   Resting tremor: absent  Intention tremor: absent  Action tremor: absent    Reflexes   Right brachioradialis: 1+  Left brachioradialis: 1+  Right biceps: 1+  Left biceps: 1+  Right triceps: 1+  Left triceps: 1+  Right patellar: 1+  Left patellar: 1+  Right achilles: 1+  Left achilles: 1+  Right : 1+  Left : 1+  Right Galaviz: absent  Left Galaviz: absent  Right ankle clonus: absent  Left ankle clonus: absent        MEDICAL DECISION MAKING    Imaging Studies:     Xr Chest Pa & Lateral    Result Date: 1/21/2020  Narrative: CHEST INDICATION:   fever COMPARISON:  Chest radiograph from 10/10/2018 and 10/11/2013  EXAM PERFORMED/VIEWS:  XR CHEST AP & LATERAL  FINDINGS: Mild cardiomegaly  CABG  Right subclavian pacemaker leads in the right atrial appendage, right ventricular apex, and coronary vein  Question increased opacity in the right mid lung and right posterior costophrenic angle on the lateral projection  No effusion or pneumothorax  Osseous structures are within normal limits for age  Impression: Question increased opacity in the right midlung and in the right posterior costophrenic angle on the lateral projection  If this is a real finding, it could represent pneumonia  The study was marked in [facility] for immediate notification  Workstation performed: [id number]     Ct Head Wo Contrast    Result Date: 2/12/2020  Narrative: CT BRAIN - WITHOUT CONTRAST INDICATION:   S06  5X9A: Traumatic subdural hemorrhage with loss of consciousness of unspecified duration, initial encounter  COMPARISON:  CT 1/22/2020 TECHNIQUE:  CT examination of the brain was performed  In addition to axial images, coronal 2D reformatted images were created and submitted for interpretation  Radiation dose length product (DLP) for this visit:  0699 646 28 85 mGy-cm     This examination, like all CT scans performed in the [facility], was performed utilizing techniques to minimize radiation dose exposure, including the use of iterative reconstruction and automated exposure control  IMAGE QUALITY:  Diagnostic  FINDINGS: PARENCHYMA:  Encephalomalacic changes, footprint of left MCA infarcts are stable  Marked distortion of the left frontal operculum and insular/subinsular parenchyma are absent  Diffuse generalized volume loss, consistent with age  The subacute left frontal falx subdural and small right anterior subarachnoid hemorrhage have largely resolved  However, there is a new thin recent subdural along the anterior falx which was not previously present series  In addition, there is a relatively low dense right frontal subdural collection  Slightly greater than CSF in the small pockets of more acute hemorrhage, this approaches nearly 9 mm in width  The underlying frontal parenchyma is displaced slightly with minor rotation of the right lateral ventricle, new since prior study  This could represent chronic subdural hematoma or subdural hygroma with minor hemorrhage  There may be a similar but much smaller collection in the posterior left parietal region  VENTRICLES AND EXTRA-AXIAL SPACES:  Diffuse generalized volume loss  VISUALIZED ORBITS AND PARANASAL SINUSES:  Footprint of chronic left tripod fracture  Nonspecific soft tissue within both external auditory canals and minor fluid in the left mastoid air cells  CALVARIUM AND EXTRACRANIAL SOFT TISSUES:  No depressed calvarial fractures  Impression: Stable encephalomalacic changes related to remote left MCA infarct  Resolution of left parafalcine subdural and minor right parafalcine subarachnoid hemorrhage  New thin right anterior falx subdural  New 8mm thick largely hypodense right frontal subdural   Minimal related mass effect  Findings may be related to hygroma versus chronic subdural hematoma    This was not previously evident one month earlier  The study was marked in [employer] for significant notification  Workstation performed: [id number]     Ct Head Wo Contrast    Result Date: 1/22/2020  Narrative: CT BRAIN - WITHOUT CONTRAST INDICATION:   follow-up falx SDH  COMPARISON:  Head CT 1/21/2020 TECHNIQUE:  CT examination of the brain was performed  In addition to axial images, coronal 2D reformatted images were created and submitted for interpretation  Radiation dose length product (DLP) for this visit:  894 73 mGy-cm   This examination, like all CT scans performed in the [facility], was performed utilizing techniques to minimize radiation dose exposure, including the use of iterative  reconstruction and automated exposure control  IMAGE QUALITY:  Diagnostic  FINDINGS: PARENCHYMA AND EXTRA-AXIAL SPACES: Left anterior falx and interhemispheric subdural hematoma measuring up to 11 mm in maximum thickness is grossly stable  No new acute intracranial hemorrhage  Left frontal encephalomalacia from remote infarct is unchanged  Also sequela of remote infarct in the left temporooccipital region  Cerebral atrophy  Decreased attenuation is noted in periventricular and subcortical white matter, which is nonspecific, but is most consistent with mild microangiopathic change  No definite CT signs of acute infarction VENTRICLES AND EXTRA-AXIAL SPACES:  Normal for the patient's age  VISUALIZED ORBITS AND PARANASAL SINUSES:  Unremarkable  CALVARIUM AND EXTRACRANIAL SOFT TISSUES:  Normal      Impression: 1  Compared to prior head CT 1/21/2020, grossly stable left anterior falx and interhemispheric subdural hematoma  No new acute intracranial hemorrhage  2   Sequela of remote infarcts as described  Workstation performed: [id number]     Ct Head Wo Contrast    Result Date: 1/21/2020  Narrative: CT BRAIN - WITHOUT CONTRAST INDICATION:   frequent falls, on aspirin   COMPARISON:  9/10/2018 TECHNIQUE:  CT examination of the brain was performed  In addition to axial images, coronal 2D reformatted images were created and submitted for interpretation  Radiation dose length product (DLP) for this visit:  770 9 mGy-cm   This examination, like all CT scans performed in the [facility], was performed utilizing techniques to minimize radiation dose exposure, including the use of iterative reconstruction and automated exposure control  IMAGE QUALITY:  Diagnostic  FINDINGS: PARENCHYMA: Decreased attenuation is noted in periventricular and subcortical white matter demonstrating an appearance that is statistically most likely to represent mild microangiopathic change; this appearance is similar when compared to most recent prior examination  Prior left frontotemporal chronic CVA without change  Acute extra-axial, subdural hematoma seen along the left frontal falx with a maximum thickness of 11 mm  No CT signs of acute infarction  No intracranial mass, mass effect or midline shift  VENTRICLES AND EXTRA-AXIAL SPACES:  Generalized moderate atrophy appearing stable  Ventricles remain symmetric and within expected limits for degree of sulcation  VISUALIZED ORBITS AND PARANASAL SINUSES:  Unremarkable  CALVARIUM AND EXTRACRANIAL SOFT TISSUES:  Normal      Impression: Small acute left frontal parafalcine subdural hematoma  No mass effect or shift  Generalized atrophy and left frontal encephalomalacia appearing stable  I personally discussed this study with Won Menendez on 1/21/2020 at 9:12 AM  Workstation performed: [id number]       I have personally reviewed pertinent reports     and I have personally reviewed pertinent films in PACS

## 2020-02-13 NOTE — ASSESSMENT & PLAN NOTE
· Patient presents for 2-week follow up s/p hospitalization for falls and left frontal parafalcine subdural hematoma  · He was on ASA, Plavix and Eliquis for hx CABG/stent/stroke, these were discontinued  · He was discharged to rehab but checked himself out after 3 days  · He was seen by his PCP last week and Xarelto was restarted  · Per his daughter he has fallen 3 times on Saturday on his driveway and continues to fall frequently  · He is at his baseline mental status per his family  · Imaging reviewed by myself and attending as follows:  · CT head:  Stable encephalomalacia sick changes related to remote left MCA infarct  Resolution of left parafalcine subdural on minor right parafalcine subarachnoid hemorrhage  New thin right anterior falx subdural   New 8 mm thick largely hyperdense right frontal subdural   Minimal related mass effect  · Discussed extensively with patient, wife and daughter that reinitiating of Xarelto as well as new trauma is likely what resulted in new subdural hemorrhages  · Discusses extensively that we recommend that he discontinue all AC/AP  Discussed risks and benefits and discussed that this needs to be further discussed with PCP Dr Eleazar Ruano as well as Cardiologist so that family can share in decision making regarding decision to continue  · Further discussed possible surgical interventions for worsening SDH including vilma holes and DHC  Discussed that due to patient's age and comorbidities procedures would carry significant risk and he would likely not recover well  · Patient to return in 4 weeks with repeat CT head  · Patient, wife and daughter verbalized understanding and are in agreement with plan

## 2020-02-13 NOTE — TELEPHONE ENCOUNTER
Spoke with patient's wife on the phone and reiterated that Mr Hari Pinto should not be taking Xarelto at this time  She states she has removed it from his pill box  I discussed that we can reconsider resuming at follow up appointment in 4 weeks

## 2020-02-13 NOTE — PROGRESS NOTES
Discussed case extensively with Dr Enmanuel Golden on the telephone today  Dr Enmanuel Golden a search that he did not tell patient to restart Xarelto, in fact he told the patient to refrain from continuing taking any anticoagulant or antiplatelet drugs  He agrees with our assessment that patient should not be taking any blood thinners and that he should follow-up with a cardiologist as well

## 2020-02-24 DIAGNOSIS — I25.10 CORONARY ARTERY DISEASE INVOLVING NATIVE CORONARY ARTERY OF NATIVE HEART WITHOUT ANGINA PECTORIS: ICD-10-CM

## 2020-02-24 RX ORDER — CARVEDILOL 6.25 MG/1
TABLET ORAL
Qty: 180 TABLET | Refills: 0 | Status: SHIPPED | OUTPATIENT
Start: 2020-02-24 | End: 2020-11-16

## 2020-11-16 DIAGNOSIS — I25.10 CORONARY ARTERY DISEASE INVOLVING NATIVE CORONARY ARTERY OF NATIVE HEART WITHOUT ANGINA PECTORIS: ICD-10-CM

## 2020-11-16 RX ORDER — CARVEDILOL 6.25 MG/1
TABLET ORAL
Qty: 180 TABLET | Refills: 0 | Status: SHIPPED | OUTPATIENT
Start: 2020-11-16 | End: 2021-02-15

## 2020-12-03 ENCOUNTER — LAB REQUISITION (OUTPATIENT)
Dept: LAB | Facility: HOSPITAL | Age: 80
End: 2020-12-03
Payer: COMMERCIAL

## 2020-12-03 DIAGNOSIS — R31.29 OTHER MICROSCOPIC HEMATURIA: ICD-10-CM

## 2020-12-03 LAB
BACTERIA UR QL AUTO: NORMAL /HPF
BILIRUB UR QL STRIP: NEGATIVE
CLARITY UR: CLEAR
COLOR UR: YELLOW
GLUCOSE UR STRIP-MCNC: NEGATIVE MG/DL
HGB UR QL STRIP.AUTO: ABNORMAL
HYALINE CASTS #/AREA URNS LPF: NORMAL /LPF
KETONES UR STRIP-MCNC: NEGATIVE MG/DL
LEUKOCYTE ESTERASE UR QL STRIP: NEGATIVE
NITRITE UR QL STRIP: NEGATIVE
NON-SQ EPI CELLS URNS QL MICRO: NORMAL /HPF
PH UR STRIP.AUTO: 5 [PH]
PROT UR STRIP-MCNC: ABNORMAL MG/DL
RBC #/AREA URNS AUTO: NORMAL /HPF
SP GR UR STRIP.AUTO: 1.02 (ref 1–1.03)
UROBILINOGEN UR QL STRIP.AUTO: 0.2 E.U./DL
WBC #/AREA URNS AUTO: NORMAL /HPF

## 2020-12-03 PROCEDURE — 81001 URINALYSIS AUTO W/SCOPE: CPT | Performed by: UROLOGY

## 2021-02-15 DIAGNOSIS — I25.10 CORONARY ARTERY DISEASE INVOLVING NATIVE CORONARY ARTERY OF NATIVE HEART WITHOUT ANGINA PECTORIS: ICD-10-CM

## 2021-02-15 RX ORDER — CARVEDILOL 6.25 MG/1
TABLET ORAL
Qty: 180 TABLET | Refills: 0 | Status: SHIPPED | OUTPATIENT
Start: 2021-02-15 | End: 2021-05-24

## 2021-05-05 ENCOUNTER — HOSPITAL ENCOUNTER (EMERGENCY)
Facility: HOSPITAL | Age: 81
Discharge: HOME | End: 2021-05-05
Attending: EMERGENCY MEDICINE
Payer: COMMERCIAL

## 2021-05-05 ENCOUNTER — APPOINTMENT (EMERGENCY)
Dept: RADIOLOGY | Facility: HOSPITAL | Age: 81
End: 2021-05-05
Payer: COMMERCIAL

## 2021-05-05 VITALS
DIASTOLIC BLOOD PRESSURE: 106 MMHG | HEART RATE: 80 BPM | WEIGHT: 210 LBS | BODY MASS INDEX: 31.83 KG/M2 | HEIGHT: 68 IN | OXYGEN SATURATION: 97 % | TEMPERATURE: 97.7 F | SYSTOLIC BLOOD PRESSURE: 143 MMHG | RESPIRATION RATE: 16 BRPM

## 2021-05-05 DIAGNOSIS — T14.8XXA MUSCLE STRAIN: Primary | ICD-10-CM

## 2021-05-05 DIAGNOSIS — T14.8XXA CONTUSION: ICD-10-CM

## 2021-05-05 PROCEDURE — 99284 EMERGENCY DEPT VISIT MOD MDM: CPT | Performed by: EMERGENCY MEDICINE

## 2021-05-05 PROCEDURE — 99283 EMERGENCY DEPT VISIT LOW MDM: CPT

## 2021-05-05 PROCEDURE — 73590 X-RAY EXAM OF LOWER LEG: CPT

## 2021-05-05 PROCEDURE — 73552 X-RAY EXAM OF FEMUR 2/>: CPT

## 2021-05-05 PROCEDURE — 73502 X-RAY EXAM HIP UNI 2-3 VIEWS: CPT

## 2021-05-05 NOTE — DISCHARGE INSTRUCTIONS
Return if symptoms worsen or if new symptoms develop  Follow up with orthopedic surgery as well as your primary physician for further care and evaluation

## 2021-05-11 ENCOUNTER — OFFICE VISIT (OUTPATIENT)
Dept: OBGYN CLINIC | Facility: CLINIC | Age: 81
End: 2021-05-11
Payer: COMMERCIAL

## 2021-05-11 VITALS — WEIGHT: 210 LBS | BODY MASS INDEX: 31.83 KG/M2 | HEIGHT: 68 IN

## 2021-05-11 DIAGNOSIS — S80.12XA CONTUSION OF LEFT LOWER LEG, INITIAL ENCOUNTER: Primary | ICD-10-CM

## 2021-05-11 DIAGNOSIS — S76.011A STRAIN OF RIGHT HIP, INITIAL ENCOUNTER: ICD-10-CM

## 2021-05-11 PROCEDURE — 99203 OFFICE O/P NEW LOW 30 MIN: CPT | Performed by: ORTHOPAEDIC SURGERY

## 2021-05-11 NOTE — PROGRESS NOTES
ASSESSMENT/PLAN:    Diagnoses and all orders for this visit:    Contusion of left lower leg, initial encounter  -     Ambulatory referral to Physical Therapy; Future    Strain of right hip, initial encounter  -     Ambulatory referral to Orthopedic Surgery  -     Ambulatory referral to Physical Therapy; Future         x-rays of the patient's right hip and femur as well as left tib-fib were negative for any fractures or dislocations  The patient has a strain of his right hip as well a result resolving contusion of his left lower leg  Possible treatment options were discussed with the patient  He was given a referral for physical therapy for strengthening, stretching and range of motion  He will follow up with our office in 6 weeks  The patient is acceptable this plan  Return in about 6 weeks (around 6/22/2021)  _____________________________________________________  CHIEF COMPLAINT:  Chief Complaint   Patient presents with    Right Thigh - Pain         SUBJECTIVE:  Myriam Milner is a 78 y o  male who presents   To our office complaining of right hip to knee pain as well as left lower leg pain  Patient states he injured his legs by a car door and also states he had a recent fall  He is still complaining of bilateral pain albeit improved since the falls  He had several x-rays performed which were negative for any fractures or dislocations  He was recently in a nursing facility after having pneumonia  He denies any numbness or tingling  He denies any fever or chills       The following portions of the patient's history were reviewed and updated as appropriate: allergies, current medications, past family history, past medical history, past social history, past surgical history and problem list     PAST MEDICAL HISTORY:  Past Medical History:   Diagnosis Date    Atrioventricular block, Mobitz type 2     s/p BiV Medtronic pacemaker 9-    CAD (coronary artery disease)     s/p stent 1997; s/p CABG x3 LIMA-D1 and LAD, SV- LV of RCA 2012    Carotid artery stenosis 2014    Heterogeneous atheroma at the origin of the internal carotid arteries bilaterally causing a mild degree of stenosis on each side   Diabetes mellitus ([facility] [address] [facility]     History of echocardiogram 2014    EF 35-40%, technically difficult study      Hyperlipidemia     Hypertension     Ischemic cardiomyopathy     PAF (paroxysmal atrial fibrillation) (HCC)     Seizures     Skin cancer     Stroke ([facility] [address] )        PAST SURGICAL HISTORY:  Past Surgical History:   Procedure Laterality Date    CARDIAC PACEMAKER PLACEMENT  2018    BiV Medtronic pacemaker    CORONARY ARTERY BYPASS GRAFT  2012    CABG x3 LIMA-D1 and LAD, SV- LV of RCA        FAMILY HISTORY:  Family History   Problem Relation Age of Onset    Alzheimer's disease Mother     Stroke Father     Suicide Attempts Son        SOCIAL HISTORY:  Social History     Tobacco Use    Smoking status: Former Smoker     Quit date:      Years since quittin 3    Smokeless tobacco: Never Used   Substance Use Topics    Alcohol use: Never     Frequency: Never     Binge frequency: Never    Drug use: Not on file       MEDICATIONS:    Current Outpatient Medications:     acetaminophen (TYLENOL) 325 mg tablet, Take 2 tablets (650 mg total) by mouth every 6 (six) hours as needed for mild pain or fever, Disp: 30 tablet, Rfl: 0    allopurinol (ZYLOPRIM) 100 mg tablet, Take 100 mg by mouth daily, Disp: , Rfl:     atorvastatin (LIPITOR) 10 mg tablet, Take 40 mg by mouth daily , Disp: , Rfl:     carvedilol (COREG) 6 25 mg tablet, Take 1 tablet by mouth twice daily with food, Disp: 180 tablet, Rfl: 0    Diclofenac Sodium (VOLTAREN) 1 %, Apply 2 g topically 4 (four) times a day, Disp: 50 g, Rfl: 0    esomeprazole (NexIUM) 20 mg capsule, Take 20 mg by mouth every morning before breakfast, Disp: , Rfl:     finasteride (PROSCAR) 5 mg tablet, Take 5 mg by mouth daily, Disp: , Rfl:     glipiZIDE (GLUCOTROL) 10 mg tablet, Take by mouth 2 (two) times a day before meals, Disp: , Rfl:     Heparin Sodium, Porcine, (HEPARIN, PORCINE,) 5,000 units/mL, Inject 1 mL (5,000 Units total) under the skin every 8 (eight) hours (Patient not taking: Reported on 2/13/2020), Disp: 1 mL, Rfl: 0    levETIRAcetam (KEPPRA) 500 mg tablet, Take 500 mg by mouth every 12 (twelve) hours, Disp: , Rfl:     lisinopril (ZESTRIL) 2 5 mg tablet, , Disp: , Rfl:     metFORMIN (GLUCOPHAGE) 500 mg tablet, Take 500 mg by mouth 3 (three) times a day before meals, Disp: , Rfl:     oxyCODONE (ROXICODONE) 5 mg immediate release tablet, TAKE 1 TABLET BY MOUTH EVERY 6 HOURS AS NEEDED FOR SEVERE PAIN (PAIN SCORE 7 10) MAX DAILY AMOUNT 20 MG, Disp: , Rfl:     sertraline (ZOLOFT) 100 mg tablet, Take 50 mg by mouth daily, Disp: , Rfl:     ALLERGIES:  No Known Allergies    ROS:  Review of Systems     Constitutional: Negative for fatigue, fever or loss of appetite  HENT: Negative  Respiratory: Negative for shortness of breath, dyspnea  Cardiovascular: Negative for chest pain/tightness  Gastrointestinal: Negative for abdominal pain, N/V  Endocrine: Negative for cold/heat intolerance, unexplained weight loss/gain  Genitourinary: Negative for flank pain, dysuria, hematuria  Musculoskeletal: Positive for arthralgia   Skin: Negative for rash  Neurological: Negative for numbness or tingling  Psychiatric/Behavioral: Negative for agitation  _____________________________________________________  PHYSICAL EXAMINATION:    Height 5' 8" (1 727 m), weight 95 3 kg (210 lb)  Constitutional: Oriented to person, place, and time  Appears well-developed and well-nourished  No distress  HENT:   Head: Normocephalic  Eyes: Conjunctivae are normal  Right eye exhibits no discharge  Left eye exhibits no discharge  No scleral icterus  Cardiovascular: Normal rate      Pulmonary/Chest: Effort normal    Neurological: Alert and oriented to person, place, and time  Skin: Skin is warm and dry  No rash noted  Not diaphoretic  No erythema  No pallor  Psychiatric: Normal mood and affect  Behavior is normal  Judgment and thought content normal       MUSCULOSKELETAL EXAMINATION:   Physical Exam  Ortho Exam      Bilateral lower extremities are neurovascularly intact   Toes are pink mobile   Compartments are soft   Normal range of motion of the right hip   Slight tenderness to palpation of the left lower extremity   Brisk cap refill   Sensation intact   No warmth, erythema or ecchymosis present     Objective:  BP Readings from Last 1 Encounters:   05/05/21 (!) 143/106      Wt Readings from Last 1 Encounters:   05/11/21 95 3 kg (210 lb)        BMI:   Estimated body mass index is 31 93 kg/m² as calculated from the following:    Height as of this encounter: 5' 8" (1 727 m)  Weight as of this encounter: 95 3 kg (210 lb)  Radiographs:  _____________________________________________________  STUDIES REVIEWED:  I have personally reviewed pertinent films and reports in PACS  x-rays of the patient's right hip and femur as well as left tib-fib were negative for any fractures or dislocations        Alisha Vega PA-C

## 2021-05-12 ENCOUNTER — TELEPHONE (OUTPATIENT)
Dept: OBGYN CLINIC | Facility: HOSPITAL | Age: 81
End: 2021-05-12

## 2021-05-12 DIAGNOSIS — R26.9 NEUROLOGIC GAIT DYSFUNCTION: Primary | ICD-10-CM

## 2021-05-12 NOTE — TELEPHONE ENCOUNTER
Dr Polina Lares  RE: homecare services     Patients wife states she is not able to transport patient to physical therapy   Wife asked if DR Polina Lares can order homecare services for patient

## 2021-05-24 DIAGNOSIS — I25.10 CORONARY ARTERY DISEASE INVOLVING NATIVE CORONARY ARTERY OF NATIVE HEART WITHOUT ANGINA PECTORIS: ICD-10-CM

## 2021-05-24 RX ORDER — CARVEDILOL 6.25 MG/1
TABLET ORAL
Qty: 180 TABLET | Refills: 0 | Status: SHIPPED | OUTPATIENT
Start: 2021-05-24 | End: 2021-08-24

## 2021-05-26 ENCOUNTER — EVALUATION (OUTPATIENT)
Dept: PHYSICAL THERAPY | Facility: CLINIC | Age: 81
End: 2021-05-26
Payer: COMMERCIAL

## 2021-05-26 DIAGNOSIS — S76.011A STRAIN OF RIGHT HIP, INITIAL ENCOUNTER: ICD-10-CM

## 2021-05-26 DIAGNOSIS — S80.12XA CONTUSION OF LEFT LOWER LEG, INITIAL ENCOUNTER: ICD-10-CM

## 2021-05-26 PROCEDURE — 97162 PT EVAL MOD COMPLEX 30 MIN: CPT | Performed by: PHYSICAL THERAPIST

## 2021-05-26 PROCEDURE — 97140 MANUAL THERAPY 1/> REGIONS: CPT | Performed by: PHYSICAL THERAPIST

## 2021-05-26 NOTE — PROGRESS NOTES
PT Evaluation     Today's date: 2021  Patient name: Remi Hernandez  : 1940  MRN: [id number]  Referring provider: Tiarra Christiansen  Dx:   Encounter Diagnosis     ICD-10-CM    1  Strain of right hip, initial encounter  [phone] Ambulatory referral to Physical Therapy   2  Contusion of left lower leg, initial encounter  S80 12XA Ambulatory referral to Physical Therapy                  Assessment  Assessment details: Remi Hernandez is a 78 y o  male presenting to outpatient physical therapy with diagnosis of Strain of right hip and contusion of left lower leg  Patient's current impairments include pain, impaired soft tissue mobility, reduced range of motion, reduced strength, reduced postural awareness, and reduced activity tolerance  Patient's present functional limitations include difficulty with ADLs with increased need for assistance, reliance on medication and/or modalities for pain relief, poor tolerance for functional mobility and activity, and difficulty completing work/school responsibilities  Patient to benefit from skilled outpatient physical therapy 2x/week for 6 weeks in order to reduce pain, maximize pain free range of motion, increase strength and stability, and improve functional mobility/functional activity in order to maximize return to prior level of function with reduced limitations  Thank you for your referral     Impairments: abnormal gait, abnormal or restricted ROM, abnormal movement, activity intolerance, difficulty understanding, impaired balance, impaired physical strength, lacks appropriate home exercise program, pain with function and safety issue  Prognosis details: Positive prognostic indicators include positive attitude toward recovery and good understanding of diagnosis and treatment plan options  Negative prognostic indicators include chronicity of symptoms and co-morbidities and cognition  Goals  STGs to be achieved in 4 weeks:  1   Pt to demonstrate reduced subjective pain rating "at worst" by at least 2-3 points from Initial Eval in order to allow for reduced pain with ADLs and improved functional activity tolerance  2  Pt to demonstrate increased AROM of right LE by at least 5-10 degrees in order to allow for greater ease and independence with ADLs and functional mobility  3  Pt to demonstrate full PROM of bilateral HS in order to maximize joint mobility and function and allow for progression of exercise program and achievement of goals  4  Pt to demonstrate increased MMT of bilateral LEs by at least 1/2-1 grade in order to improve safety and stability with ADLs and functional mobility  LTGs to be achieved in 6-8 weeks:  1  Pt will be I with HEP in order to continue to improve quality of life and independence and reduce risk for re-injury  2  Pt to demonstrate return to community walking without limitations or restrictions  3  Pt to demonstrate improved function as noted by achieving or exceeding predicted score on FOTO outcomes assessment tool  4   Demonstrate ability to lift 15# from floor to waist and 10# from waist to shoulders      Plan  Patient would benefit from: skilled physical therapy  Other planned modality interventions: Modalities prn for symptom management  Planned therapy interventions: manual therapy, neuromuscular re-education, Rose taping, therapeutic exercise, therapeutic activities, gait training and home exercise program  Frequency: 2x week  Duration in visits: 8  Duration in weeks: 4  Plan of Care beginning date: 5/26/2021  Plan of Care expiration date: 6/25/2021  Treatment plan discussed with: patient and PTA        Subjective Evaluation    History of Present Illness  Mechanism of injury: Per EMR: In ED:  Patient is a 79 y/o male with history of afib presenting with leg pain  Patient accidentally had a truck door hit his right calf 10 days ago  Patient refused going to the hospital at that time    Patient is complaining of right-sided proximal leg pain which started after the injury, even though his left leg is 1 that hit a car door  He has had difficulty walking on it since  No nausea, vomiting, diarrhea, etc     Per ortho note:   x-rays of the patient's right hip and femur as well as left tib-fib were negative for any fractures or dislocations  The patient has a strain of his right hip as well a result resolving contusion of his left lower leg  Possible treatment options were discussed with the patient  He was given a referral for physical therapy for strengthening, stretching and range of motion  He will follow up with our office in 6 weeks  At present time:  Ongoing pain, bruising per patient - noted on left, right with pain, no bruising, he did follow with orthopedics  Was using ice and a cream as prescribed, but didn't get any relief  Reporting ongoing pain in right thigh region, not sleeping, not eating  Patient reports the only thing that is going to help him is some pain medication and a brace  Difficulty with following   Quality of life: fair    Pain  Location: Ongoing and constant pain in right thigh, left distal leg, difficult to quantify  Quality: radiating and sharp    Social Support  Steps to enter house: yes  7  Lives with: spouse    Employment status: not working  Hand dominance: right    Treatments  Current treatment: physical therapy  Patient Goals  Patient goals for therapy: increased strength, independence with ADLs/IADLs, return to sport/leisure activities, improved balance and decreased pain          Objective     Observations   Left Hip  Positive for edema  Right Hip  Positive for atrophy and muscle spasms       Additional Observation Details  Left distal bruising, medial malleoli and just proximal to this point    Tenderness     Additional Tenderness Details  TTP right thigh, TTP with palpable mass on left distal calf region  Denies LBP  Denies radicular pain    Lumbar Screen  Lumbar range of motion within normal limits with the following exceptions:Decreased flexibility with lumbar ROM   HS limitations by 50% bilaterally    Neurological Testing     Sensation     Hip   Left Hip   Intact: light touch    Right Hip   Intact: light touch    Active Range of Motion   Left Hip   Flexion: WFL  Extension: 7 degrees   Abduction: 40 degrees     Right Hip   Flexion: WFL  Extension: 5 degrees   Abduction: 40 degrees     Additional Active Range of Motion Details  (+) ITB restrictions noted bilaterally  (+) piriformis restrictions right > left    Strength/Myotome Testing     Left Hip   Planes of Motion   Flexion: 3+  Extension: 3  Abduction: 3+  Adduction: 4-    Right Hip   Planes of Motion   Flexion: 3  Extension: 3  Abduction: 3+    Additional Strength Details  Knee extension 4/5 bilaterally  Knee flexion 4-/5 left, 4/5 right  DF and PF 4/5 bilaterally       Precautions: Falls  Re-eval Date: 6/24/2021    Date 5/26       Visit Count 1       FOTO See IE       Pain In See IE       Pain Out See IE           Manuals:  HS, heel cords, hip ABd, hip flexors  Light foam roller with LE off EOB to tolerance  Manuals        LEs 15 mins right LE to tolerance in supine                               Neuro Re-Ed        Dynavision        Natus         Obstacle Course                                        Ther Ex        Aerobic        SLR x 4        HR/TR        Mini Squats        Single Leg progression                                        Ther Activity        Kitchen/ADL                Gait Training        Divided Attention        Head turns        Modalities         prn

## 2021-05-27 NOTE — PROGRESS NOTES
Daily Note     Today's date: 2021  Patient name: Soila Coronel  : 1940  MRN: [id number]  Referring provider: Mario Karimi PA-C  Dx:   Encounter Diagnosis     ICD-10-CM    1  Strain of right hip, initial encounter  [phone]    2  Contusion of left lower leg, initial encounter  S80 12XA                   Subjective: "I was outside a lot yesterday in my garden and mowing the lawn  My leg is a little tired"      Objective: See treatment diary below      Assessment: Tolerated treatment well  Pt requires redirection to task occasionally  Verbal cues for therapeutic exercise form and cues for stance and UE use on dynavision  Demonstrates slow gait, decreased step length, and narrow base of support  Postural instability on foam  Patient demonstrated fatigue post treatment and would benefit from continued PT      Plan: Continue per plan of care  Precautions: Falls  Re-eval Date: 2021    Date       Visit Count 1 2      FOTO See IE       Pain In See IE "severe"      Pain Out See IE "Doesn't feel great"          Manuals:  HS, heel cords, hip ABd, hip flexors  Light foam roller with LE off EOB to tolerance  Manuals        LEs 15 mins right LE to tolerance in supine 15min R LE supine for tolerance                              Neuro Re-Ed        Dynavision  15min  Foam, feet apart & mod tandem   Cues for R UE vs L UE CGA      Natus         Obstacle Course        Lateral stepping  5min with picking up objects SPC for balance CGA                              Ther Ex        Aerobic  10min recumbent bike lvl 4      SLR x 4        HR/TR        Mini Squats        Single Leg progression        Leg press  30lb 2x10 CS      Fwd step ups  2x12 RLE 2#ankle weight                      Ther Activity        Kitchen/ADL                Gait Training        Divided Attention        Head turns        Modalities         prn

## 2021-05-28 ENCOUNTER — OFFICE VISIT (OUTPATIENT)
Dept: PHYSICAL THERAPY | Facility: CLINIC | Age: 81
End: 2021-05-28
Payer: COMMERCIAL

## 2021-05-28 DIAGNOSIS — S76.011A STRAIN OF RIGHT HIP, INITIAL ENCOUNTER: Primary | ICD-10-CM

## 2021-05-28 DIAGNOSIS — S80.12XA CONTUSION OF LEFT LOWER LEG, INITIAL ENCOUNTER: ICD-10-CM

## 2021-05-28 PROCEDURE — 97140 MANUAL THERAPY 1/> REGIONS: CPT | Performed by: PHYSICAL THERAPIST

## 2021-05-28 PROCEDURE — 97112 NEUROMUSCULAR REEDUCATION: CPT | Performed by: PHYSICAL THERAPIST

## 2021-05-28 PROCEDURE — 97110 THERAPEUTIC EXERCISES: CPT | Performed by: PHYSICAL THERAPIST

## 2021-06-02 ENCOUNTER — OFFICE VISIT (OUTPATIENT)
Dept: PHYSICAL THERAPY | Facility: CLINIC | Age: 81
End: 2021-06-02
Payer: COMMERCIAL

## 2021-06-02 DIAGNOSIS — S80.12XA CONTUSION OF LEFT LOWER LEG, INITIAL ENCOUNTER: ICD-10-CM

## 2021-06-02 DIAGNOSIS — S76.011A STRAIN OF RIGHT HIP, INITIAL ENCOUNTER: Primary | ICD-10-CM

## 2021-06-02 PROCEDURE — 97140 MANUAL THERAPY 1/> REGIONS: CPT

## 2021-06-02 PROCEDURE — 97110 THERAPEUTIC EXERCISES: CPT

## 2021-06-02 NOTE — PROGRESS NOTES
Daily Note     Today's date: 21  Patient name: Chantel Law  : 1940  MRN: [id number]  Referring provider: Renetta Borrego PA-C  Dx:   Encounter Diagnosis     ICD-10-CM    1  Strain of right hip, initial encounter  [phone]    2  Contusion of left lower leg, initial encounter  S80 12XA                   Subjective: Pt arrived 30 min late for tx stating his wife got the time wrong and she gets everything wrong  Pt stating he is in severe pain and needs something done  States he can't eat, can't sleep due to pain  Reports he forgot his cane today due to rushing to get here  Pt also concerned about strange color of his urine  Pt advised to call his doctor and discuss these issues Pt also stating thoughts of suicide by"putting a 45 in his mouth and ending it all "      Objective: See treatment diary below      Assessment: Tolerated treatment fair  Patient would benefit from continued PT  Pt demonstrates fair technique with TE  Pt arrived 30 min late for apptmt, blaming his wife for getting the time wrong  Nahun Ramirez Pt encouraged to take responsibility for arriving on time And to check his apptmt time himself  Pt given next apptmt on large reminder paper  PT called phys office to inform Dr Joby Samayoa of pts suicidal thoughts  Left message with  who said she would have the doctor call him  Pt noted that he was going to stop at doctor's office after leaving here  Plan: Continue per plan of care  Precautions: Falls  Re-eval Date: 2021    Date      Visit Count 1 2 3     FOTO See IE       Pain In See IE "severe" 10 R thigh     Pain Out See IE "Doesn't feel great" same         Manuals:  HS, heel cords, hip ABd, hip flexors  Light foam roller with LE off EOB to tolerance  Manuals        LEs 15 mins right LE to tolerance in supine 15min R LE supine for tolerance 15min RLE  Semi reclined                             Neuro Re-Ed        Dynavision  15min  Foam, feet apart & mod tandem  Cues for R UE vs L UE CGA      Natus         Obstacle Course        Lateral stepping  5min with picking up objects SPC for balance CGA 40 feet x 6  W/o AD's                             Ther Ex        Aerobic  10min recumbent bike lvl 4 Nustep  L2  UEs/LEs  10 min     SLR x 4        HR/TR        Mini Squats        Single Leg progression                Leg press  30lb 2x10 CS 50#  30x     Fwd step ups  2x12 RLE 2#ankle weight 20x RLE     Leg ext mach   22#  30x     Leg flex mach   11#  30x     Ther Activity        Kitchen/ADL                Gait Training        Divided Attention        Head turns        Modalities         prn

## 2021-06-03 NOTE — PROGRESS NOTES
Daily Note     Today's date: 2021  Patient name: Teofilo Perez  : 1940  MRN: [id number]  Referring provider: Danish Cristina PA-C  Dx:   Encounter Diagnosis     ICD-10-CM    1  Strain of right hip, initial encounter  [phone]    2  Contusion of left lower leg, initial encounter  S80 12XA                   Subjective: "The woman I worked with last time is the best, she helped me get my medicine"    Objective: See treatment diary below      Assessment: Tolerated treatment well  Pt noted he is having less pain in his leg  He recently was prescribed medication from his doctor that he feels is helping  Able to progress therapeutic exercise as able with slight increase in pain that resided at end of session  Requires SPC or 1UE assist for single leg exercises to maintain balance  Patient demonstrated fatigue post treatment and would benefit from continued PT      Plan: Continue per plan of care  Continue to progress therapeutic exercise as able  Focus on dynamic balance and stability nv  Precautions: Falls  Re-eval Date: 2021    Date     Visit Count 1 2 3 4    FOTO See IE       Pain In See IE "severe" 10 R thigh "achy"    Pain Out See IE "Doesn't feel great" same "achy"        Manuals:  HS, heel cords, hip ABd, hip flexors  Light foam roller with LE off EOB to tolerance  Manuals       LEs 15 mins right LE to tolerance in supine 15min R LE supine for tolerance 15min RLE  Semi reclined 15 min BLE semi reclined                            Neuro Re-Ed        Dynavision  15min  Foam, feet apart & mod tandem   Cues for R UE vs L UE CGA      Natus         Obstacle Course        Lateral stepping  5min with picking up objects SPC for balance CGA 40 feet x 6  W/o AD's                             Ther Ex        Aerobic  10min recumbent bike lvl 4 Nustep  L2  UEs/LEs  10 min Nustep L2  10min    SLR x 4        HR/TR        Mini Squats        Single Leg progression                Leg press 30lb 2x10 CS 50#  30x 50# 30x    Fwd step ups  2x12 RLE 2#ankle weight 20x RLE 25x RLE 1UE    Lateral step ups    x20 w SPC for balance    Leg ext mach   22#  30x     Leg flex mach   11#  30x     Ther Activity        Kitchen/ADL                Gait Training        Divided Attention        Head turns        Modalities         prn

## 2021-06-04 ENCOUNTER — OFFICE VISIT (OUTPATIENT)
Dept: PHYSICAL THERAPY | Facility: CLINIC | Age: 81
End: 2021-06-04
Payer: COMMERCIAL

## 2021-06-04 DIAGNOSIS — S76.011A STRAIN OF RIGHT HIP, INITIAL ENCOUNTER: Primary | ICD-10-CM

## 2021-06-04 DIAGNOSIS — S80.12XA CONTUSION OF LEFT LOWER LEG, INITIAL ENCOUNTER: ICD-10-CM

## 2021-06-04 PROCEDURE — 97140 MANUAL THERAPY 1/> REGIONS: CPT | Performed by: PHYSICAL THERAPIST

## 2021-06-04 PROCEDURE — 97110 THERAPEUTIC EXERCISES: CPT | Performed by: PHYSICAL THERAPIST

## 2021-06-07 ENCOUNTER — OFFICE VISIT (OUTPATIENT)
Dept: PHYSICAL THERAPY | Facility: CLINIC | Age: 81
End: 2021-06-07
Payer: COMMERCIAL

## 2021-06-07 DIAGNOSIS — S76.011A STRAIN OF RIGHT HIP, INITIAL ENCOUNTER: Primary | ICD-10-CM

## 2021-06-07 DIAGNOSIS — S80.12XA CONTUSION OF LEFT LOWER LEG, INITIAL ENCOUNTER: ICD-10-CM

## 2021-06-07 PROCEDURE — 97112 NEUROMUSCULAR REEDUCATION: CPT

## 2021-06-07 PROCEDURE — 97140 MANUAL THERAPY 1/> REGIONS: CPT

## 2021-06-07 PROCEDURE — 97110 THERAPEUTIC EXERCISES: CPT

## 2021-06-07 NOTE — PROGRESS NOTES
Daily Note     Today's date: 2021  Patient name: Yanci Walker  : 1940  MRN: [id number]  Referring provider: Rambo Viera PA-C  Dx:   Encounter Diagnosis     ICD-10-CM    1  Strain of right hip, initial encounter  [phone]    2  Contusion of left lower leg, initial encounter  S80 12XA                   Subjective: Right LE muscle soreness after planting his entire vegetable garden this weekend  Patients wife states that he has been losing weight  Objective: See treatment diary below      Assessment: Tolerated treatment well  Stressed moderation of activity to minimize DOMS  Challenged with addition of foam to functional strengthening  Cues for improved step length with step ups  Patient demonstrated fatigue post treatment and would benefit from continued PT      Plan: Continue per plan of care  Continue to progress therapeutic exercise as able  Focus on dynamic balance and stability nv  Precautions: Falls  Re-eval Date: 2021    Date    Visit Count 1 2 3 4 5   FOTO See IE       Pain In See IE "severe" 10 R thigh "achy" " sore "    Pain Out See IE "Doesn't feel great" same "achy"        Manuals:  HS, heel cords, hip ABd, hip flexors  Light foam roller with LE off EOB to tolerance  Manuals      LEs 15 mins right LE to tolerance in supine 15min R LE supine for tolerance 15min RLE  Semi reclined 15 min BLE semi reclined 15 min BLE  Hugo reclined  SPT AF                             Neuro Re-Ed        Dynavision  15min  Foam, feet apart & mod tandem   Cues for R UE vs L UE CGA   15 min  Foam w/ FA;   Red multidirectional reaching  CG  Green/red  R UE- red  L UE- green   Natus         Obstacle Course        Lateral stepping  5min with picking up objects SPC for balance CGA 40 feet x 6  W/o AD's                             Ther Ex        Aerobic  10min recumbent bike lvl 4 Nustep  L2  UEs/LEs  10 min Nustep L2  10min Nustep L2  10 mins   SLR x 4        HR/TR Mini Squats        Single Leg progression                Leg press  30lb 2x10 CS 50#  30x 50# 30x 50# 3x10     Fwd step ups  2x12 RLE 2#ankle weight 20x RLE 25x RLE 1UE Step off foam to 8" block;  SPC for balance  RLE  CG  1x8       Lateral step ups    x20 w SPC for balance    Leg ext mach   22#  30x     Leg flex mach   11#  30x  11# 1x15  22# 1x15   Ther Activity        Kitchen/ADL                Gait Training        Divided Attention        Head turns        Modalities         prn

## 2021-06-08 NOTE — PROGRESS NOTES
Daily Note     Today's date: 2021  Patient name: Evan April  : 1940  MRN: [id number]  Referring provider: Fernando Canas PA-C  Dx:   Encounter Diagnosis     ICD-10-CM    1  Strain of right hip, initial encounter  [id number]    2  Contusion of left lower leg, initial encounter  S80 12XA        Start Time: 0900  Stop Time: 1000  Total time in clinic (min): 60 minutes    Subjective: "I just try to do what I can  I try to give it rest but it's just constant pain"      Objective: See treatment diary below      Assessment: Tolerated treatment well  Pt needs cues to perform exercises slowly and with control  Responded well to manual therapy, pt found mild relief in RLE  Daniela Gerard Demonstrates difficulty with stepping on and off foam, needs 1 UE assist  Patient demonstrated fatigue post treatment and would benefit from continued PT      Plan: Continue per plan of care  Continue manual therapy  Progress therapeutic ex as able       Precautions: Falls  Re-eval Date: 2021    Date    Visit Count 1 6 3 4 5   FOTO See IE       Pain In See IE "Sore" 10 R thigh "achy" " sore "    Pain Out See IE "Sore" same "achy"        Manuals:  HS, heel cords, hip ABd, hip flexors  Light foam roller with LE off EOB to tolerance  Manuals     LEs 15 mins right LE to tolerance in supine 15min R LE semi reclined for tolerance  B/L hs, R hip flexor off EOB   15min RLE  Semi reclined 15 min BLE semi reclined 15 min BLE  Hugo reclined  SPT AF                             Neuro Re-Ed        Dynavision     15 min  Foam w/ FA;   Red multidirectional reaching  CG  Green/red  R UE- red  L UE- green   Natus         Obstacle Course        Lateral stepping  8min with picking up objects SPC for balance CGA 40 feet x 6  W/o AD's                             Ther Ex        Aerobic  8min recumbent bike lvl 2 Nustep  L2  UEs/LEs  10 min Nustep L2  10min Nustep L2  10 mins   SLR x 4        HR/TR        Mini Squats Single Leg progression        SKTC  30" x3              Leg press  50lb x30  50#  30x 50# 30x 50# 3x10     Fwd step ups  Foam to 8" block 1 UE on parallel bar RLE  2x10 20x RLE 25x RLE 1UE Step off foam to 8" block;  SPC for balance  RLE  CG  1x8       Lateral step ups    x20 w SPC for balance    Leg ext mach  33# x30 22#  30x     Leg flex mach  22# x20 11#  30x  11# 1x15  22# 1x15   Ther Activity        Kitchen/ADL                Gait Training        Divided Attention        Head turns        Modalities         prn

## 2021-06-09 ENCOUNTER — OFFICE VISIT (OUTPATIENT)
Dept: PHYSICAL THERAPY | Facility: CLINIC | Age: 81
End: 2021-06-09
Payer: COMMERCIAL

## 2021-06-09 DIAGNOSIS — S80.12XA CONTUSION OF LEFT LOWER LEG, INITIAL ENCOUNTER: ICD-10-CM

## 2021-06-09 DIAGNOSIS — S76.011A STRAIN OF RIGHT HIP, INITIAL ENCOUNTER: Primary | ICD-10-CM

## 2021-06-09 PROCEDURE — 97110 THERAPEUTIC EXERCISES: CPT | Performed by: PHYSICAL THERAPIST

## 2021-06-09 PROCEDURE — 97140 MANUAL THERAPY 1/> REGIONS: CPT | Performed by: PHYSICAL THERAPIST

## 2021-06-11 NOTE — PROGRESS NOTES
Daily Note     Today's date: 2021  Patient name: Franchesca Morfin  : 1940  MRN: [id number]  Referring provider: Iveth Fields PA-C  Dx:   Encounter Diagnosis     ICD-10-CM    1  Strain of right hip, initial encounter  [phone] [id number]    2  Contusion of left lower leg, initial encounter  S80 12XA                   Subjective: "I had to keep the heat pad on it so it wouldn't drive me crazy! I have a doctor appointment next Tuesday " - reported to PT with no SPC      Objective: See treatment diary below      Assessment: Tolerated treatment well  Pt able to maintain dynamic balance without SPC  Pt reported mild relief after manual therapy  Able to progress therapeutic exercise  Patient demonstrated fatigue post treatment and would benefit from continued PT      Plan: Continue per plan of care  Progress therapeutic ex as able        Precautions: Falls  Re-eval Date: 2021    Date    Visit Count 1 6 7 4 5   FOTO See IE       Pain In See IE "Sore" "terrible" "achy" " sore "    Pain Out See IE "Sore"  "achy"        Manuals:  HS, heel cords, hip ABd, hip flexors  Light foam roller with LE off EOB to tolerance  Manuals     LEs 15 mins right LE to tolerance in supine 15min R LE semi reclined for tolerance  B/L hs, R hip flexor off EOB   8min RLE  Semi reclined/sidelying    R hip flexor, R HS 15 min BLE semi reclined 15 min BLE  Hugo reclined  SPT AF                             Neuro Re-Ed        Dynavision     15 min  Foam w/ FA;   Red multidirectional reaching  CG  Green/red  R UE- red  L UE- green   Natus         Obstacle Course        Lateral stepping  8min with picking up objects SPC for balance CGA                              Ther Ex        Aerobic  8min recumbent bike lvl 2 Recumbent lvl 3 10min Nustep L2  10min Nustep L2  10 mins   SLR x 4        HR/TR        Mini Squats        Single Leg progression        SKTC  30" x3 30"x3     Bridges   2x10 5"     Leg press 50lb x30  70#  30x 50# 30x 50# 3x10     Fwd step ups  Foam to 8" block 1 UE on parallel bar RLE  2x10 // bar 2 UE support for step up 0 UE step down   4"block with foam on top  x15 RLE  25x RLE 1UE Step off foam to 8" block;  SPC for balance  RLE  CG  1x8       Lateral step ups    x20 w SPC for balance    Leg ext mach  33# x30      Leg flex mach  22# x20   11# 1x15  22# 1x15   Ther Activity        Kitchen/ADL                Gait Training        Divided Attention        Head turns        Modalities         prn

## 2021-06-14 ENCOUNTER — OFFICE VISIT (OUTPATIENT)
Dept: PHYSICAL THERAPY | Facility: CLINIC | Age: 81
End: 2021-06-14
Payer: COMMERCIAL

## 2021-06-14 DIAGNOSIS — S80.12XA CONTUSION OF LEFT LOWER LEG, INITIAL ENCOUNTER: ICD-10-CM

## 2021-06-14 DIAGNOSIS — S76.011A STRAIN OF RIGHT HIP, INITIAL ENCOUNTER: Primary | ICD-10-CM

## 2021-06-14 PROCEDURE — 97140 MANUAL THERAPY 1/> REGIONS: CPT | Performed by: PHYSICAL THERAPIST

## 2021-06-14 PROCEDURE — 97110 THERAPEUTIC EXERCISES: CPT | Performed by: PHYSICAL THERAPIST

## 2021-06-16 NOTE — PROGRESS NOTES
Daily Note     Today's date: 2021  Patient name: Martin Ott  : 1940  MRN: [id number]  Referring provider: Nahed Reilly PA-C  Dx:   Encounter Diagnosis     ICD-10-CM    1  Strain of right hip, initial encounter  [phone] [id number]    2  Contusion of left lower leg, initial encounter  S80 12XA                   Subjective: "I coasted the leg over the weekend and it didn't do any help!" "I only get relief at home with a heat pad "      Objective: See treatment diary below      Assessment: Tolerated treatment well  Pt notes that his pain is no longer at his knee, mainly staying above the knee  Required cueing to slow down therapeutic exercise  Pt noted mild relief with RLE manual therapy  Patient demonstrated fatigue post treatment and would benefit from continued PT      Plan: Continue per plan of care  Continue to monitor RLE pain        Precautions: Falls  Re-eval Date: 2021    Date        Visit Count 8       FOTO See IE       Pain In Same RLE pain       Pain Out Same RLE pain           Manuals:  HS, heel cords, hip ABd, hip flexors  Light foam roller with LE off EOB to tolerance  Manuals        LEs 15 mins right semi reclined R HS and sidelying R hip flexor                               Neuro Re-Ed        Dynavision        Natus         Obstacle Course        Lateral stepping x8laps 10#db SPC CS                               Ther Ex        Aerobic        SLR x 4        HR/TR        Mini Squats        Single Leg progression        SKTC 30"x3       Bridges 2x10 5"       Leg press 50# 15x  60# 15x       Fwd step ups        Lateral step ups        Leg ext mach 33# 30x       Leg flex mach 22# 30x       Ther Activity        Kitchen/ADL                Gait Training        Divided Attention        Head turns        Modalities         prn

## 2021-06-17 ENCOUNTER — OFFICE VISIT (OUTPATIENT)
Dept: PHYSICAL THERAPY | Facility: CLINIC | Age: 81
End: 2021-06-17
Payer: COMMERCIAL

## 2021-06-17 DIAGNOSIS — S76.011A STRAIN OF RIGHT HIP, INITIAL ENCOUNTER: Primary | ICD-10-CM

## 2021-06-17 DIAGNOSIS — S80.12XA CONTUSION OF LEFT LOWER LEG, INITIAL ENCOUNTER: ICD-10-CM

## 2021-06-17 PROCEDURE — 97110 THERAPEUTIC EXERCISES: CPT

## 2021-06-17 PROCEDURE — 97140 MANUAL THERAPY 1/> REGIONS: CPT

## 2021-06-22 ENCOUNTER — OFFICE VISIT (OUTPATIENT)
Dept: OBGYN CLINIC | Facility: CLINIC | Age: 81
End: 2021-06-22
Payer: COMMERCIAL

## 2021-06-22 VITALS
WEIGHT: 210 LBS | SYSTOLIC BLOOD PRESSURE: 122 MMHG | DIASTOLIC BLOOD PRESSURE: 70 MMHG | HEIGHT: 68 IN | BODY MASS INDEX: 31.83 KG/M2 | HEART RATE: 80 BPM

## 2021-06-22 DIAGNOSIS — S76.111A QUADRICEPS STRAIN, RIGHT, INITIAL ENCOUNTER: Primary | ICD-10-CM

## 2021-06-22 PROCEDURE — 99213 OFFICE O/P EST LOW 20 MIN: CPT | Performed by: ORTHOPAEDIC SURGERY

## 2021-06-22 NOTE — PROGRESS NOTES
ASSESSMENT/PLAN:    Diagnoses and all orders for this visit:    Quadriceps strain, right, initial encounter  -     Ambulatory referral to Physical Therapy; Future         the patient's range of motion and strength have improved since last visit  He is still complaining of right quadriceps pain  Possible treatment options were discussed with the patient  He was given a referral to continue physical therapy for strengthening, stretching and range of motion  He will follow up with our office in 6 weeks  The patient is acceptable to this plan  Return in about 6 weeks (around 8/3/2021)  _____________________________________________________  CHIEF COMPLAINT:  Chief Complaint   Patient presents with    Right Hip - Follow-up         SUBJECTIVE:  Chantel Law is a 78 y o  male who presents   To our office for a follow-up of a strain of his right hip  The patient states he has been participating in physical therapy  He is still complaining of a right quadriceps pain, albeit slightly improved since starting therapy  He denies any numbness or tingling  He denies any fever or chills  He denies any new falls or trauma  The following portions of the patient's history were reviewed and updated as appropriate: allergies, current medications, past family history, past medical history, past social history, past surgical history and problem list     PAST MEDICAL HISTORY:  Past Medical History:   Diagnosis Date    Atrioventricular block, Mobitz type 2     s/p BiV Medtronic pacemaker 9-    CAD (coronary artery disease)     s/p stent 1997; s/p CABG x3 LIMA-D1 and LAD, SV- LV of RCA 12/18/2012    Carotid artery stenosis 01/16/2014    Heterogeneous atheroma at the origin of the internal carotid arteries bilaterally causing a mild degree of stenosis on each side   Diabetes mellitus ([address] )     History of echocardiogram 01/16/2014    EF 35-40%, technically difficult study      Hyperlipidemia     Hypertension     Ischemic cardiomyopathy     PAF (paroxysmal atrial fibrillation) (HCC)     Seizures     Skin cancer     Stroke ([facility] [address] )        PAST SURGICAL HISTORY:  Past Surgical History:   Procedure Laterality Date    CARDIAC PACEMAKER PLACEMENT  2018    BiV Medtronic pacemaker    CORONARY ARTERY BYPASS GRAFT  2012    CABG x3 LIMA-D1 and LAD, SV- LV of RCA        FAMILY HISTORY:  Family History   Problem Relation Age of Onset    Alzheimer's disease Mother     Stroke Father     Suicide Attempts Son        SOCIAL HISTORY:  Social History     Tobacco Use    Smoking status: Former Smoker     Quit date:      Years since quittin 4    Smokeless tobacco: Never Used   Vaping Use    Vaping Use: Never used   Substance Use Topics    Alcohol use: Never    Drug use: Not on file       MEDICATIONS:    Current Outpatient Medications:     acetaminophen (TYLENOL) 325 mg tablet, Take 2 tablets (650 mg total) by mouth every 6 (six) hours as needed for mild pain or fever, Disp: 30 tablet, Rfl: 0    allopurinol (ZYLOPRIM) 100 mg tablet, Take 100 mg by mouth daily, Disp: , Rfl:     atorvastatin (LIPITOR) 10 mg tablet, Take 40 mg by mouth daily , Disp: , Rfl:     carvedilol (COREG) 6 25 mg tablet, Take 1 tablet by mouth twice daily with food, Disp: 180 tablet, Rfl: 0    Diclofenac Sodium (VOLTAREN) 1 %, Apply 2 g topically 4 (four) times a day, Disp: 50 g, Rfl: 0    esomeprazole (NexIUM) 20 mg capsule, Take 20 mg by mouth every morning before breakfast, Disp: , Rfl:     finasteride (PROSCAR) 5 mg tablet, Take 5 mg by mouth daily, Disp: , Rfl:     glipiZIDE (GLUCOTROL) 10 mg tablet, Take by mouth 2 (two) times a day before meals, Disp: , Rfl:     Heparin Sodium, Porcine, (HEPARIN, PORCINE,) 5,000 units/mL, Inject 1 mL (5,000 Units total) under the skin every 8 (eight) hours (Patient not taking: Reported on 2020), Disp: 1 mL, Rfl: 0    levETIRAcetam (KEPPRA) 500 mg tablet, Take 500 mg by mouth every 12 (twelve) hours, Disp: , Rfl:     lisinopril (ZESTRIL) 2 5 mg tablet, , Disp: , Rfl:     metFORMIN (GLUCOPHAGE) 500 mg tablet, Take 500 mg by mouth 3 (three) times a day before meals, Disp: , Rfl:     oxyCODONE (ROXICODONE) 5 mg immediate release tablet, TAKE 1 TABLET BY MOUTH EVERY 6 HOURS AS NEEDED FOR SEVERE PAIN (PAIN SCORE 7 10) MAX DAILY AMOUNT 20 MG, Disp: , Rfl:     sertraline (ZOLOFT) 100 mg tablet, Take 50 mg by mouth daily, Disp: , Rfl:     ALLERGIES:  No Known Allergies    ROS:  Review of Systems     Constitutional: Negative for fatigue, fever or loss of appetite  HENT: Negative  Respiratory: Negative for shortness of breath, dyspnea  Cardiovascular: Negative for chest pain/tightness  Gastrointestinal: Negative for abdominal pain, N/V  Endocrine: Negative for cold/heat intolerance, unexplained weight loss/gain  Genitourinary: Negative for flank pain, dysuria, hematuria  Musculoskeletal: Positive for Myalgia  Skin: Negative for rash  Neurological: Negative for numbness or tingling  Psychiatric/Behavioral: Negative for agitation  _____________________________________________________  PHYSICAL EXAMINATION:    Blood pressure 122/70, pulse 80, height 5' 8" (1 727 m), weight 95 3 kg (210 lb)  Constitutional: Oriented to person, place, and time  Appears well-developed and well-nourished  No distress  HENT:   Head: Normocephalic  Eyes: Conjunctivae are normal  Right eye exhibits no discharge  Left eye exhibits no discharge  No scleral icterus  Cardiovascular: Normal rate  Pulmonary/Chest: Effort normal    Neurological: Alert and oriented to person, place, and time  Skin: Skin is warm and dry  No rash noted  Not diaphoretic  No erythema  No pallor  Psychiatric: Normal mood and affect   Behavior is normal  Judgment and thought content normal       MUSCULOSKELETAL EXAMINATION:   Physical Exam  Ortho Exam     right lower extremity is neurovascularly intact   Toes are pink mobile   Compartments are soft   There is gross tenderness to palpation of the right quadriceps muscle, especially distally  Good range of motion of the hip   Brisk cap refill   Sensation intact   Negative Homans  No warmth, erythema or ecchymosis present  Objective:  BP Readings from Last 1 Encounters:   06/22/21 122/70      Wt Readings from Last 1 Encounters:   06/22/21 95 3 kg (210 lb)        BMI:   Estimated body mass index is 31 93 kg/m² as calculated from the following:    Height as of this encounter: 5' 8" (1 727 m)  Weight as of this encounter: 95 3 kg (210 lb)            Rachel Vela PA-C

## 2021-07-01 ENCOUNTER — EVALUATION (OUTPATIENT)
Dept: PHYSICAL THERAPY | Facility: CLINIC | Age: 81
End: 2021-07-01
Payer: COMMERCIAL

## 2021-07-01 DIAGNOSIS — S76.111A QUADRICEPS STRAIN, RIGHT, INITIAL ENCOUNTER: ICD-10-CM

## 2021-07-01 PROCEDURE — 97110 THERAPEUTIC EXERCISES: CPT | Performed by: PHYSICAL THERAPIST

## 2021-07-01 NOTE — PROGRESS NOTES
Daily Note     Today's date: 2021  Patient name: Marco A Urbina  : 1940  MRN: [id number]  Referring provider: Naseem Santos  Dx:   Encounter Diagnosis     ICD-10-CM    1   Quadriceps strain, right, initial encounter  S76 111A Ambulatory referral to Physical Therapy       Start Time: 1500  Stop Time: 1600  Total time in clinic (min): 60 minutes    Subjective: see re eval      Objective: See treatment diary below      Assessment: see reeval      Plan: see reeval     Precautions: Falls  Re-eval Date: 2021    Date       Visit Count 8 9      FOTO See IE       Pain In Same RLE pain       Pain Out Same RLE pain           Manuals:  HS, heel cords, hip ABd, hip flexors  Light foam roller with LE off EOB to tolerance  Manuals        LEs 15 mins right semi reclined R HS and sidelying R hip flexor 10 min hip flexor/ rectus fem stretch                              Neuro Re-Ed        Dynavision        Natus         Obstacle Course        Lateral stepping x8laps 10#db SPC CS                               Ther Ex        Aerobic  Nustep L4 10min      SLR x 4        HR/TR        Mini Squats        Single Leg progression        SKTC 30"x3       Bridges 2x10 5"       Leg press 50# 15x  60# 15x 75# 3x10      Fwd step ups        Lateral step ups        Leg ext mach 33# 30x 33#3x10      Leg flex mach 22# 30x 22# 3x10      Ther Activity        Kitchen/ADL                Gait Training        Divided Attention        Head turns        Modalities         prn

## 2021-07-01 NOTE — PROGRESS NOTES
PT Re-Evaluation     Today's date: 2021  Patient name: Louie Baker  : 1940  MRN: [id number]  Referring provider: Nehemiah Sanchez  Dx:   Encounter Diagnosis     ICD-10-CM    1  Quadriceps strain, right, initial encounter  S76 111A Ambulatory referral to Physical Therapy       Start Time: 1500  Stop Time: 1600  Total time in clinic (min): 60 minutes    Assessment  Assessment details: Louie Baker is a 78 y o  male presenting to outpatient physical therapy with diagnosis of Strain of right hip and contusion of left lower leg  Pt has made progress in terms of his strength and mobility restrictions  He still presents with pain, impaired soft tissue mobility, reduced range of motion, reduced strength, reduced postural awareness, and reduced activity tolerance  Patient's present functional limitations include difficulty with ADLs with increased need for assistance, reliance on medication and/or modalities for pain relief, and poor tolerance for functional mobility and activity  Patient to benefit from skilled outpatient physical therapy 2x/week for 4 weeks in order to reduce pain, maximize pain free range of motion, increase strength and stability, and improve functional mobility/functional activity in order to maximize return to prior level of function with reduced limitations  Thank you for your referral     Impairments: abnormal gait, abnormal or restricted ROM, abnormal movement, activity intolerance, difficulty understanding, impaired balance, impaired physical strength, lacks appropriate home exercise program, pain with function and safety issue  Prognosis details: Positive prognostic indicators include positive attitude toward recovery and good understanding of diagnosis and treatment plan options  Negative prognostic indicators include chronicity of symptoms and co-morbidities and cognition  Goals  STGs to be achieved in 4 weeks:  1   Pt to demonstrate reduced subjective pain rating "at worst" by at least 2-3 points from Initial Eval in order to allow for reduced pain with ADLs and improved functional activity tolerance  - Not met  2  Pt to demonstrate increased AROM of right LE by at least 5-10 degrees in order to allow for greater ease and independence with ADLs and functional mobility  - MET  3  Pt to demonstrate full PROM of bilateral HS in order to maximize joint mobility and function and allow for progression of exercise program and achievement of goals  - met  4  Pt to demonstrate increased MMT of bilateral LEs by at least 1/2-1 grade in order to improve safety and stability with ADLs and functional mobility  - Partially Met    LTGs to be achieved in 6-8 weeks:  1  Pt will be I with HEP in order to continue to improve quality of life and independence and reduce risk for re-injury  2  Pt to demonstrate return to community walking without limitations or restrictions  3  Pt to demonstrate improved function as noted by achieving or exceeding predicted score on FOTO outcomes assessment tool  4   Demonstrate ability to lift 15# from floor to waist and 10# from waist to shoulders  5  Pt will verbalize reduced pain since re-evaluation  Plan  Patient would benefit from: skilled physical therapy  Other planned modality interventions: Modalities prn for symptom management  Planned therapy interventions: manual therapy, neuromuscular re-education, Rose taping, therapeutic exercise, therapeutic activities, gait training and home exercise program  Frequency: 2x week  Duration in visits: 8  Duration in weeks: 4  Plan of Care beginning date: 7/1/2021  Plan of Care expiration date: 7/31/2021  Treatment plan discussed with: patient and PTA        Subjective Evaluation    History of Present Illness  Mechanism of injury: Update:  Pt reports going to see doctor, he said that he wants to continue therapy  He notes a slight change in his pain but it still is bothering him   He does feel like therapy has been helping slightly with the pain  He does feel like his legs are stronger but not what they used to be  Per EMR: In ED:  Patient is a 77 y/o male with history of afib presenting with leg pain  Patient accidentally had a truck door hit his right calf 10 days ago  Patient refused going to the hospital at that time  Patient is complaining of right-sided proximal leg pain which started after the injury, even though his left leg is 1 that hit a car door  He has had difficulty walking on it since  No nausea, vomiting, diarrhea, etc     Per ortho note:   x-rays of the patient's right hip and femur as well as left tib-fib were negative for any fractures or dislocations  The patient has a strain of his right hip as well a result resolving contusion of his left lower leg  Possible treatment options were discussed with the patient  He was given a referral for physical therapy for strengthening, stretching and range of motion  He will follow up with our office in 6 weeks  At present time:  Ongoing pain, bruising per patient - noted on left, right with pain, no bruising, he did follow with orthopedics  Was using ice and a cream as prescribed, but didn't get any relief  Reporting ongoing pain in right thigh region, not sleeping, not eating  Patient reports the only thing that is going to help him is some pain medication and a brace  Difficulty with following   Quality of life: fair    Pain  Location: Ongoing and constant pain in right thigh above knee cap   Difficult to quantify  Quality: radiating and sharp    Social Support  Steps to enter house: yes  7  Lives with: spouse    Employment status: not working  Hand dominance: right    Treatments  Current treatment: physical therapy  Patient Goals  Patient goals for therapy: increased strength, independence with ADLs/IADLs, return to sport/leisure activities, improved balance and decreased pain          Objective     Observations     Right Hip  Positive for atrophy       Tenderness     Additional Tenderness Details  Slightly TTP above R knee cap    Lumbar Screen  Lumbar range of motion within normal limits with the following exceptions:Decreased flexibility with lumbar ROM       Neurological Testing     Sensation     Hip   Left Hip   Intact: light touch    Right Hip   Intact: light touch    Active Range of Motion   Left Hip   Flexion: WFL  Extension: 8 degrees   Abduction: 40 degrees     Right Hip   Flexion: WFL  Extension: 8 degrees   Abduction: 40 degrees     Additional Active Range of Motion Details  (+) ITB restrictions noted bilaterally  (+) piriformis restrictions right > left  25% reduced knee flexion B/L    Strength/Myotome Testing     Left Hip   Planes of Motion   Flexion: 4-  Extension: 4-  Abduction: 4-    Right Hip   Planes of Motion   Flexion: 4-  Extension: 4-  Abduction: 4-    Additional Strength Details  Knee extension 4/5 bilaterally  Knee flexion 4/5 left, 4/5 right  DF and PF 4/5 bilaterally       Precautions: Falls  Re-eval Date: 07/31/2021

## 2021-07-07 ENCOUNTER — EVALUATION (OUTPATIENT)
Dept: PHYSICAL THERAPY | Facility: CLINIC | Age: 81
End: 2021-07-07
Payer: COMMERCIAL

## 2021-07-07 DIAGNOSIS — S76.011A STRAIN OF RIGHT HIP, INITIAL ENCOUNTER: ICD-10-CM

## 2021-07-07 DIAGNOSIS — S80.12XA CONTUSION OF LEFT LOWER LEG, INITIAL ENCOUNTER: ICD-10-CM

## 2021-07-07 DIAGNOSIS — S76.111A QUADRICEPS STRAIN, RIGHT, INITIAL ENCOUNTER: Primary | ICD-10-CM

## 2021-07-07 PROCEDURE — 97110 THERAPEUTIC EXERCISES: CPT | Performed by: PHYSICAL THERAPIST

## 2021-07-07 PROCEDURE — 97140 MANUAL THERAPY 1/> REGIONS: CPT | Performed by: PHYSICAL THERAPIST

## 2021-07-07 NOTE — PROGRESS NOTES
Daily Note     Today's date: 2021  Patient name: Buffy Stevens  : 1940  MRN: [id number]  Referring provider: Ayse Craig  Dx:   Encounter Diagnosis     ICD-10-CM    1  Quadriceps strain, right, initial encounter  S76 111A    2  Strain of right hip, initial encounter  S76 011A    3  Contusion of left lower leg, initial encounter  S80 12XA        Start Time: 1100  Stop Time: 1200  Total time in clinic (min): 60 minutes    Subjective: "I woke up with bad back pain today"      Objective: See treatment diary below      Assessment: Tolerated treatment well  Pt able to progress in therapeutic exercises  Seemed to find mild relief in rectus femoris stretch  Patient demonstrated fatigue post treatment and would benefit from continued PT      Plan: Continue per plan of care        Precautions: Falls  Re-eval Date: 2021    Date      Visit Count 8 9 10     FOTO See IE       Pain In Same RLE pain       Pain Out Same RLE pain           Manuals:  HS, heel cords, hip ABd, hip flexors  Light foam roller with LE off EOB to tolerance  Manuals        LEs 15 mins right semi reclined R HS and sidelying R hip flexor 10 min hip flexor/ rectus fem stretch 10min hip flexor, B/L HS                             Neuro Re-Ed        Dynavision        Natus         Obstacle Course        Lateral stepping x8laps 10#db SPC CS  [place] 5# hold 3-4steps 1x5 ea side                             Ther Ex        Aerobic  Nustep L4 10min Nustep L3-4 10min     SLR x 4        HR/TR        Mini Squats        Single Leg progression        SKTC 30"x3  RLE 2x60"     Bridges 2x10 5"       Leg press 50# 15x  60# 15x 75# 3x10 80# 3x10     Fwd step ups        Lateral step ups        Leg ext mach 33# 30x 33#3x10 33# 3x10     Leg flex mach 22# 30x 22# 3x10 22# 3x10     Ther Activity        Kitchen/ADL                Gait Training        Divided Attention        Head turns        Modalities         prn

## 2021-07-08 ENCOUNTER — EVALUATION (OUTPATIENT)
Dept: PHYSICAL THERAPY | Facility: CLINIC | Age: 81
End: 2021-07-08
Payer: COMMERCIAL

## 2021-07-08 DIAGNOSIS — S80.12XA CONTUSION OF LEFT LOWER LEG, INITIAL ENCOUNTER: ICD-10-CM

## 2021-07-08 DIAGNOSIS — S76.111A QUADRICEPS STRAIN, RIGHT, INITIAL ENCOUNTER: Primary | ICD-10-CM

## 2021-07-08 DIAGNOSIS — S76.011A STRAIN OF RIGHT HIP, INITIAL ENCOUNTER: ICD-10-CM

## 2021-07-08 PROCEDURE — 97140 MANUAL THERAPY 1/> REGIONS: CPT | Performed by: PHYSICAL THERAPIST

## 2021-07-08 PROCEDURE — 97110 THERAPEUTIC EXERCISES: CPT | Performed by: PHYSICAL THERAPIST

## 2021-07-08 NOTE — PROGRESS NOTES
Daily Note     Today's date: 2021  Patient name: Jen Omer  : 1940  MRN: [id number]  Referring provider: Whit Hernandez PA-C  Dx: No diagnosis found                 Subjective: see discharge note      Objective: See treatment diary below      Assessment: see discharge note      Plan: see discharge note     Precautions: Falls  Re-eval Date: 2021    Date     Visit Count 8 9 10 11    FOTO See IE       Pain In Same RLE pain       Pain Out Same RLE pain           Manuals:  HS, heel cords, hip ABd, hip flexors  Light foam roller with LE off EOB to tolerance  Manuals        LEs 15 mins right semi reclined R HS and sidelying R hip flexor 10 min hip flexor/ rectus fem stretch 10min hip flexor, B/L HS 10min R hip flexor, L/s sidelying G3, BL HS, R piriformis                            Neuro Re-Ed        Dynavision        Natus         Obstacle Course        Lateral stepping x8laps 10#db SPC CS  Whitney 5# hold 3-4steps 1x5 ea side                             Ther Ex        Aerobic  Nustep L4 10min Nustep L3-4 10min     SLR x 4        HR/TR        Mini Squats        Single Leg progression        SKTC 30"x3  RLE 2x60" 1x60"    Bridges 2x10 5"       Leg press 50# 15x  60# 15x 75# 3x10 80# 3x10 90# 3x10    Fwd step ups    RLE 2x10    Lateral step ups        Leg ext mach 33# 30x 33#3x10 33# 3x10 33# 3x12    Leg flex mach 22# 30x 22# 3x10 22# 3x10 22# 3x12    Ther Activity        Kitchen/ADL                Gait Training        Divided Attention        Head turns        Modalities         prn

## 2021-07-08 NOTE — PROGRESS NOTES
PT Discharge    Today's date: 2021  Patient name: Katrina Hensley  : 1940  MRN: [id number]  Referring provider: Oli Mendoza  Dx:   Encounter Diagnosis     ICD-10-CM    1  Quadriceps strain, right, initial encounter  S76 111A    2  Strain of right hip, initial encounter  S76 011A    3  Contusion of left lower leg, initial encounter  S80 12XA                   Assessment  Assessment details: Katrina Hensley is a 78 y o  male presenting to outpatient physical therapy with diagnosis of Strain of right hip and contusion of left lower leg  He still feels that he has made some progress in therapy but feels as if he is not making much more progress  He wishes to discontinue physical therapy at this time  Pt given written HEP to maintain strength and mobility gains  Also given number for pain management for follow up in care  Impairments: abnormal gait, activity intolerance, difficulty understanding, impaired balance, impaired physical strength, pain with function and safety issue  Prognosis details: Positive prognostic indicators include positive attitude toward recovery and good understanding of diagnosis and treatment plan options  Negative prognostic indicators include chronicity of symptoms and co-morbidities and cognition  Goals  STGs to be achieved in 4 weeks:  1  Pt to demonstrate reduced subjective pain rating "at worst" by at least 2-3 points from Initial Eval in order to allow for reduced pain with ADLs and improved functional activity tolerance  - Not met  2  Pt to demonstrate increased AROM of right LE by at least 5-10 degrees in order to allow for greater ease and independence with ADLs and functional mobility  - MET  3  Pt to demonstrate full PROM of bilateral HS in order to maximize joint mobility and function and allow for progression of exercise program and achievement of goals  - met  4   Pt to demonstrate increased MMT of bilateral LEs by at least 1/2-1 grade in order to improve safety and stability with ADLs and functional mobility  - Partially Met    LTGs to be achieved in 6-8 weeks:  1  Pt will be I with HEP in order to continue to improve quality of life and independence and reduce risk for re-injury  - met  2  Pt to demonstrate return to community walking without limitations or restrictions  - not met  3  Pt to demonstrate improved function as noted by achieving or exceeding predicted score on FOTO outcomes assessment tool  - not assessed  4  Demonstrate ability to lift 15# from floor to waist and 10# from waist to shoulders - met  5  Pt will verbalize reduced pain since re-evaluation  - partially met      Plan  Treatment plan discussed with: patient and PTA        Subjective Evaluation    History of Present Illness  Mechanism of injury: Update:  Pt feels that nothing seems to be helping his pain any more  He wishes to find a specialist in order to help with his pain  Per EMR: In ED:  Patient is a 79 y/o male with history of afib presenting with leg pain  Patient accidentally had a truck door hit his right calf 10 days ago  Patient refused going to the hospital at that time  Patient is complaining of right-sided proximal leg pain which started after the injury, even though his left leg is 1 that hit a car door  He has had difficulty walking on it since  No nausea, vomiting, diarrhea, etc     Per ortho note:   x-rays of the patient's right hip and femur as well as left tib-fib were negative for any fractures or dislocations  The patient has a strain of his right hip as well a result resolving contusion of his left lower leg  Possible treatment options were discussed with the patient  He was given a referral for physical therapy for strengthening, stretching and range of motion  He will follow up with our office in 6 weeks  At present time:  Ongoing pain, bruising per patient - noted on left, right with pain, no bruising, he did follow with orthopedics    Was using ice and a cream as prescribed, but didn't get any relief  Reporting ongoing pain in right thigh region, not sleeping, not eating  Patient reports the only thing that is going to help him is some pain medication and a brace  Difficulty with following   Quality of life: fair    Pain  Location: Ongoing and constant pain in right thigh above knee cap  Difficult to quantify  Quality: radiating and sharp    Social Support  Steps to enter house: yes  7  Lives with: spouse    Employment status: not working  Hand dominance: right    Treatments  Current treatment: physical therapy  Patient Goals  Patient goals for therapy: increased strength, independence with ADLs/IADLs, return to sport/leisure activities, improved balance and decreased pain          Objective     Observations     Right Hip  Positive for atrophy       Tenderness     Additional Tenderness Details  Slightly TTP above R knee cap    Lumbar Screen  Lumbar range of motion within normal limits with the following exceptions:Decreased flexibility with lumbar ROM       Neurological Testing     Sensation     Hip   Left Hip   Intact: light touch    Right Hip   Intact: light touch    Active Range of Motion   Left Hip   Flexion: WFL  Extension: 8 degrees   Abduction: 40 degrees     Right Hip   Flexion: WFL  Extension: 8 degrees   Abduction: 40 degrees     Additional Active Range of Motion Details  (+) ITB restrictions noted bilaterally  (+) piriformis restrictions right > left  25% reduced knee flexion B/L    Strength/Myotome Testing     Left Hip   Planes of Motion   Flexion: 4-  Extension: 4-  Abduction: 4-    Right Hip   Planes of Motion   Flexion: 4-  Extension: 4-  Abduction: 4-    Additional Strength Details  Knee extension 4/5 bilaterally  Knee flexion 4/5 left, 4/5 right  DF and PF 4/5 bilaterally       Precautions: Falls  Re-eval Date: 07/31/2021

## 2021-08-09 ENCOUNTER — APPOINTMENT (OUTPATIENT)
Dept: RADIOLOGY | Facility: CLINIC | Age: 81
End: 2021-08-09
Payer: COMMERCIAL

## 2021-08-09 ENCOUNTER — CONSULT (OUTPATIENT)
Dept: PAIN MEDICINE | Facility: CLINIC | Age: 81
End: 2021-08-09
Payer: COMMERCIAL

## 2021-08-09 VITALS
HEART RATE: 82 BPM | BODY MASS INDEX: 30.19 KG/M2 | WEIGHT: 199.2 LBS | TEMPERATURE: 99.3 F | DIASTOLIC BLOOD PRESSURE: 78 MMHG | HEIGHT: 68 IN | SYSTOLIC BLOOD PRESSURE: 130 MMHG

## 2021-08-09 DIAGNOSIS — M47.816 LUMBAR SPONDYLOSIS: ICD-10-CM

## 2021-08-09 DIAGNOSIS — M51.36 LUMBAR DEGENERATIVE DISC DISEASE: ICD-10-CM

## 2021-08-09 DIAGNOSIS — M54.16 LUMBAR RADICULOPATHY: ICD-10-CM

## 2021-08-09 DIAGNOSIS — G62.9 PERIPHERAL POLYNEUROPATHY: Primary | ICD-10-CM

## 2021-08-09 PROCEDURE — 72110 X-RAY EXAM L-2 SPINE 4/>VWS: CPT

## 2021-08-09 PROCEDURE — 99204 OFFICE O/P NEW MOD 45 MIN: CPT | Performed by: ANESTHESIOLOGY

## 2021-08-09 RX ORDER — TAMSULOSIN HYDROCHLORIDE 0.4 MG/1
0.4 CAPSULE ORAL
COMMUNITY

## 2021-08-09 RX ORDER — PREGABALIN 50 MG/1
50 CAPSULE ORAL 3 TIMES DAILY
Qty: 90 CAPSULE | Refills: 1 | Status: SHIPPED | OUTPATIENT
Start: 2021-08-09 | End: 2021-09-08

## 2021-08-09 RX ORDER — METHOCARBAMOL 500 MG/1
500 TABLET, FILM COATED ORAL 3 TIMES DAILY
COMMUNITY

## 2021-08-09 NOTE — PROGRESS NOTES
Assessment:  1  Peripheral polyneuropathy    2  Lumbar radiculopathy    3  Lumbar degenerative disc disease    4  Lumbar spondylosis        Plan:  Patient is an 78-year-old male complaints of left-sided leg with chronic pain syndrome secondary to lumbar radiculopathy presents office for initial consultation  From patient history physical exam appears to be neuropathic in nature in the L3 and L4 nerve root distribution of the left lower extremity  We will need more imaging to better diagnose patient's current presentation  1  We will trial patient on Lyrica 50 mg p o  t i d  for peripheral neuropathy  2  We will order an x-ray of the lumbar spine to better assess the degenerative changes that correlate patient's current presentation  3  We will order an MRI of the lumbar spine to better assess the discogenic pathology behind patient's low back pain  4  Follow-up in 1 month to review diagnostic images and response to medication  History of Present Illness: The patient is a 78 y o  male who presents for consultation in regards to Leg Pain and Knee Pain  Symptoms have been present for 3 months  Symptoms began without any precipitating injury or trauma  Pain is reported to be 8 on the numeric rating scale  Symptoms are felt constantly and worst in the morning  Symptoms are characterized as shooting and sharp  Symptoms are associated with left leg weakness  Aggravating factors include standing, bending, leaning forward, leaning bckward, sitting and walking  Relieving factors include notthing  No change in symptoms with kneeling, lying down, turning the head, relaxation, coughing/sneezing and bowel movements  Treatments that have been helpful include heat/ice  physical therapy have provided no relief  Medications to relieve symptoms include none  Review of Systems:    Review of Systems   Constitutional: Positive for chills  Negative for fever and unexpected weight change     HENT: Positive for hearing loss  Negative for trouble swallowing  Eyes: Negative for visual disturbance  Respiratory: Negative for shortness of breath and wheezing  Cardiovascular: Negative for chest pain and palpitations  Gastrointestinal: Negative for constipation, diarrhea, nausea and vomiting  Endocrine: Positive for polydipsia and polyuria  Negative for cold intolerance and heat intolerance  Genitourinary: Negative for difficulty urinating and frequency  Musculoskeletal: Positive for myalgias  Negative for arthralgias, gait problem and joint swelling  Skin: Negative for rash  Neurological: Negative for dizziness, seizures, syncope, weakness and headaches  Hematological: Does not bruise/bleed easily  Psychiatric/Behavioral: Negative for dysphoric mood  All other systems reviewed and are negative  Past Medical History:   Diagnosis Date    Atrioventricular block, Mobitz type 2     s/p BiV Medtronic pacemaker 9-    CAD (coronary artery disease)     s/p stent 1997; s/p CABG x3 LIMA-D1 and LAD, SV- LV of RCA 12/18/2012    Carotid artery stenosis 01/16/2014    Heterogeneous atheroma at the origin of the internal carotid arteries bilaterally causing a mild degree of stenosis on each side   Diabetes mellitus ([facility] [address] )     History of echocardiogram 01/16/2014    EF 35-40%, technically difficult study      Hyperlipidemia     Hypertension     Ischemic cardiomyopathy     PAF (paroxysmal atrial fibrillation) (HCC)     Seizures     Skin cancer     Stroke [facility])        Past Surgical History:   Procedure Laterality Date    CARDIAC PACEMAKER PLACEMENT  09/13/2018    BiV Medtronic pacemaker    CORONARY ARTERY BYPASS GRAFT  12/18/2012    CABG x3 LIMA-D1 and LAD, SV- LV of RCA        Family History   Problem Relation Age of Onset    Alzheimer's disease Mother     Stroke Father     Suicide Attempts Son        Social History     Occupational History    Not on file   Tobacco Use    Smoking status: Former Smoker     Quit date:      Years since quittin 6    Smokeless tobacco: Never Used   Vaping Use    Vaping Use: Never used   Substance and Sexual Activity    Alcohol use: Never    Drug use: Not on file    Sexual activity: Never         Current Outpatient Medications:     acetaminophen (TYLENOL) 325 mg tablet, Take 2 tablets (650 mg total) by mouth every 6 (six) hours as needed for mild pain or fever, Disp: 30 tablet, Rfl: 0    allopurinol (ZYLOPRIM) 100 mg tablet, Take 100 mg by mouth daily, Disp: , Rfl:     atorvastatin (LIPITOR) 10 mg tablet, Take 40 mg by mouth daily , Disp: , Rfl:     carvedilol (COREG) 6 25 mg tablet, Take 1 tablet by mouth twice daily with food, Disp: 180 tablet, Rfl: 0    Diclofenac Sodium (VOLTAREN) 1 %, Apply 2 g topically 4 (four) times a day, Disp: 50 g, Rfl: 0    esomeprazole (NexIUM) 20 mg capsule, Take 20 mg by mouth every morning before breakfast, Disp: , Rfl:     finasteride (PROSCAR) 5 mg tablet, Take 5 mg by mouth daily, Disp: , Rfl:     glipiZIDE (GLUCOTROL) 10 mg tablet, Take by mouth 2 (two) times a day before meals, Disp: , Rfl:     Heparin Sodium, Porcine, (HEPARIN, PORCINE,) 5,000 units/mL, Inject 1 mL (5,000 Units total) under the skin every 8 (eight) hours (Patient not taking: Reported on 2020), Disp: 1 mL, Rfl: 0    levETIRAcetam (KEPPRA) 500 mg tablet, Take 500 mg by mouth every 12 (twelve) hours, Disp: , Rfl:     lisinopril (ZESTRIL) 2 5 mg tablet, , Disp: , Rfl:     metFORMIN (GLUCOPHAGE) 500 mg tablet, Take 500 mg by mouth 3 (three) times a day before meals, Disp: , Rfl:     oxyCODONE (ROXICODONE) 5 mg immediate release tablet, TAKE 1 TABLET BY MOUTH EVERY 6 HOURS AS NEEDED FOR SEVERE PAIN (PAIN SCORE 7 10) MAX DAILY AMOUNT 20 MG, Disp: , Rfl:     sertraline (ZOLOFT) 100 mg tablet, Take 50 mg by mouth daily, Disp: , Rfl:     No Known Allergies    Physical Exam:    /78 (BP Location: Left arm, Patient Position: Sitting, Cuff Size: Standard)   Pulse 82   Temp 99 3 °F (37 4 °C)   Ht 5' 8" (1 727 m)   Wt 90 4 kg (199 lb 3 2 oz)   BMI 30 29 kg/m²     Constitutional: normal, well developed, well nourished, alert, in no distress and non-toxic and no overt pain behavior  Eyes: anicteric  HEENT: grossly intact  Neck: supple, symmetric, trachea midline and no masses   Pulmonary:even and unlabored  Cardiovascular:No edema or pitting edema present  Skin:Normal without rashes or lesions and well hydrated  Psychiatric:Mood and affect appropriate  Neurologic:Cranial Nerves II-XII grossly intact  Musculoskeletal:ambulates with cane     Lumbar/Sacral Spine examination demonstrates  Decreased range of motion lumbar spine with pain upon: flexion, lateral rotation to the left/right, and bending to the left/right  Bilateral lumbar paraspinals tender to palpation  Muscle spasms noted in the lumbar area bilaterally  4/5 left lower extremity strength in all muscle groups  Positive seated straight leg raise for bilateral lower extremities  Sensitivity to light touch intact bilateral lower extremities  2+ reflexes in the patella and Achilles  No ankle clonus     Imaging  LEFT TIBIA AND FIBULA     INDICATION:   pain      COMPARISON:  None     VIEWS:  XR TIBIA FIBULA 2 VW LEFT   Images: 4     FINDINGS:  Small plantar calcaneal spur     There is no acute fracture or dislocation      Mild degenerative changes at the knee and ankle     No lytic or blastic osseous lesion      Soft tissues are unremarkable  Vascular calcifications  Medial proximal calf vascular clips     IMPRESSION:     No acute osseous abnormality        RIGHT HIP     INDICATION:   left pain      COMPARISON:  None     VIEWS:  XR HIP/PELV 2-3 VWS RIGHT W PELVIS IF PERFORMED   Images: 3     FINDINGS:     There is no acute fracture or dislocation      Mild degenerative changes both hips     No lytic or blastic osseous lesion      Soft tissues are unremarkable      Multilevel degenerative lumbar spondylosis     IMPRESSION:  Mild degenerative arthritis both hips     No acute osseous abnormality

## 2021-08-09 NOTE — PATIENT INSTRUCTIONS
Pregabalin (By mouth)   Pregabalin ([id number])  Treats nerve and muscle pain, including fibromyalgia  Also treats partial-onset seizures  Brand Name(s): Lyrica, Lyrica CR   There may be other brand names for this medicine  When This Medicine Should Not Be Used: This medicine is not right for everyone  Do not use it if you had an allergic reaction to pregabalin  How to Use This Medicine:   Capsule, Liquid, Long Acting Tablet  · Take your medicine as directed  Your dose may need to be changed several times to find what works best for you  · Extended-release tablet: Swallow the extended-release tablet whole  Do not crush, break, or chew it  Take it after an evening meal   · Oral liquid: Measure the oral liquid medicine with a marked measuring spoon, oral syringe, or medicine cup  · This medicine should come with a Medication Guide  Ask your pharmacist for a copy if you do not have one  · Missed dose: Take a dose as soon as you remember  If it is almost time for your next dose, wait until then and take a regular dose  Do not take extra medicine to make up for a missed dose  If you miss a dose of the extended-release tablet after your evening meal, take it before bedtime after a snack  If you miss the dose before bedtime, take it after your morning meal  If you do not take the dose the following morning, then take the next dose at your regular time after your evening meal  Do not take 2 doses at the same time  · Store the medicine in a closed container at room temperature, away from heat, moisture, and direct light  Drugs and Foods to Avoid:   Ask your doctor or pharmacist before using any other medicine, including over-the-counter medicines, vitamins, and herbal products  · Some medicines can affect how pregabalin works  Tell your doctor if you are using any of the following:   ? ACE inhibitor (including benazepril, enalapril, lisinopril, quinapril, ramipril)  ?  Oral diabetes medicine (including metformin, pioglitazone, rosiglitazone)  · Do not drink alcohol while you are using this medicine  · Tell your doctor if you use anything else that makes you sleepy  Some examples are allergy medicine, narcotic pain medicine, and alcohol  Tell your doctor if you are also using oxycodone, lorazepam, or zolpidem  Warnings While Using This Medicine:   · Tell your doctor if you are pregnant or breastfeeding, or if you have kidney disease, heart failure, heart rhythm problems, lung or breathing problems, a bleeding disorder, diabetes, sores or skin problems, or a low blood platelet count  Tell your doctor if you have a history of angioedema (severe swelling), alcohol or drug abuse, depression, or other mood problems  · This medicine may cause the following problems:   ? Angioedema (severe swelling), which may be life-threatening  ? Changes in mood or behavior, including suicidal thoughts or behavior  ? Respiratory depression (serious breathing problem that can be life-threatening), when used with narcotic pain medicines  ? Peripheral edema (swelling of your hands, ankles, feet, or lower legs)  ? Increased risk for cancer and bleeding  ? Serious muscle problems  ? Heart rhythm changes  · This medicine may make you dizzy or drowsy  It may also cause blurry or double vision  Do not drive or do anything else that could be dangerous until you know how this medicine affects you  · Do not stop using this medicine suddenly  Your doctor will need to slowly decrease your dose before you stop it completely  · Your doctor will do lab tests at regular visits to check on the effects of this medicine  Keep all appointments  · Keep all medicine out of the reach of children  Never share your medicine with anyone    Possible Side Effects While Using This Medicine:   Call your doctor right away if you notice any of these side effects:  · Allergic reaction: Itching or hives, swelling in your face or hands, swelling or tingling in your mouth or throat, chest tightness, trouble breathing  · Blistering, peeling, red skin rash  · Blue lips, fingernails, or skin, trouble breathing, chest pain  · Blurry or double vision  · Fever, chills, cough, sore throat, body aches  · Muscle pain, tenderness, or weakness, general feeling of illness  · Rapid weight gain, swelling in your hands, ankles, or feet  · Severe dizziness or drowsiness  · Sudden mood changes, unusual moods or behavior, including extreme happiness or depression, thoughts or attempts of killing oneself  · Swelling in your throat, head, or neck  · Uneven heartbeat  · Unusual bleeding, bruising, or weakness  If you notice these less serious side effects, talk with your doctor:   · Confusion, trouble concentrating  · Constipation  · Dry mouth  If you notice other side effects that you think are caused by this medicine, tell your doctor  Call your doctor for medical advice about side effects  You may report side effects to FDA at 5-668-FDA-0303  © Copyright [employer] 2021 Information is for End User's use only and may not be sold, redistributed or otherwise used for commercial purposes  The above information is an  only  It is not intended as medical advice for individual conditions or treatments  Talk to your doctor, nurse or pharmacist before following any medical regimen to see if it is safe and effective for you

## 2021-08-18 ENCOUNTER — TELEPHONE (OUTPATIENT)
Dept: PAIN MEDICINE | Facility: CLINIC | Age: 81
End: 2021-08-18

## 2021-08-18 NOTE — TELEPHONE ENCOUNTER
Renae from [place] [id number] called in stating that the patient has to go to another facilty for her MRI- she has pacemaker & they cant see her there   Several attempts have been made to contact the patient but shes not responding- mri is scheduled for 8/19

## 2021-08-19 ENCOUNTER — HOSPITAL ENCOUNTER (OUTPATIENT)
Dept: MRI IMAGING | Facility: HOSPITAL | Age: 81
Discharge: HOME | End: 2021-08-19

## 2021-08-19 DIAGNOSIS — M51.36 LUMBAR DEGENERATIVE DISC DISEASE: ICD-10-CM

## 2021-08-19 DIAGNOSIS — M54.16 LUMBAR RADICULOPATHY: ICD-10-CM

## 2021-08-19 DIAGNOSIS — M47.816 LUMBAR SPONDYLOSIS: ICD-10-CM

## 2021-08-23 NOTE — TELEPHONE ENCOUNTER
RN attempted to reach pt to advise of same  MRI appt was cancelled  VMMLOM with CB# and [place]  3rd Attempt- Please send CNR letter

## 2021-08-24 DIAGNOSIS — I25.10 CORONARY ARTERY DISEASE INVOLVING NATIVE CORONARY ARTERY OF NATIVE HEART WITHOUT ANGINA PECTORIS: ICD-10-CM

## 2021-08-24 RX ORDER — CARVEDILOL 6.25 MG/1
TABLET ORAL
Qty: 180 TABLET | Refills: 0 | Status: SHIPPED | OUTPATIENT
Start: 2021-08-24 | End: 2021-11-19

## 2021-11-19 DIAGNOSIS — I25.10 CORONARY ARTERY DISEASE INVOLVING NATIVE CORONARY ARTERY OF NATIVE HEART WITHOUT ANGINA PECTORIS: ICD-10-CM

## 2021-11-19 RX ORDER — CARVEDILOL 6.25 MG/1
TABLET ORAL
Qty: 180 TABLET | Refills: 0 | Status: SHIPPED | OUTPATIENT
Start: 2021-11-19

## 2022-01-01 ENCOUNTER — HOSPITAL ENCOUNTER (EMERGENCY)
Facility: HOSPITAL | Age: 82
Discharge: HOME | End: 2022-01-01
Attending: EMERGENCY MEDICINE
Payer: COMMERCIAL

## 2022-01-01 ENCOUNTER — APPOINTMENT (EMERGENCY)
Dept: RADIOLOGY | Facility: HOSPITAL | Age: 82
End: 2022-01-01
Payer: COMMERCIAL

## 2022-01-01 VITALS
OXYGEN SATURATION: 98 % | SYSTOLIC BLOOD PRESSURE: 137 MMHG | RESPIRATION RATE: 20 BRPM | TEMPERATURE: 98 F | HEART RATE: 64 BPM | DIASTOLIC BLOOD PRESSURE: 72 MMHG

## 2022-01-01 DIAGNOSIS — J06.9 VIRAL UPPER RESPIRATORY TRACT INFECTION: Primary | ICD-10-CM

## 2022-01-01 LAB
ANION GAP SERPL CALCULATED.3IONS-SCNC: 8 MMOL/L (ref 4–13)
BNP SERPL-MCNC: 492 PG/ML (ref 1–100)
BUN SERPL-MCNC: 25 MG/DL (ref 5–25)
CALCIUM SERPL-MCNC: 9.3 MG/DL (ref 8.4–10.2)
CARDIAC TROPONIN I PNL SERPL HS: 13 NG/L
CHLORIDE SERPL-SCNC: 104 MMOL/L (ref 96–108)
CO2 SERPL-SCNC: 25 MMOL/L (ref 21–32)
CREAT SERPL-MCNC: 1.49 MG/DL (ref 0.6–1.3)
GFR SERPL CREATININE-BSD FRML MDRD: 42 ML/MIN/1.73SQ M
GLUCOSE SERPL-MCNC: 121 MG/DL (ref 65–140)
POTASSIUM SERPL-SCNC: 4 MMOL/L (ref 3.5–5.3)
SODIUM SERPL-SCNC: 137 MMOL/L (ref 135–147)

## 2022-01-01 PROCEDURE — U0005 INFEC AGEN DETEC AMPLI PROBE: HCPCS | Performed by: EMERGENCY MEDICINE

## 2022-01-01 PROCEDURE — 71045 X-RAY EXAM CHEST 1 VIEW: CPT

## 2022-01-01 PROCEDURE — 80048 BASIC METABOLIC PNL TOTAL CA: CPT | Performed by: EMERGENCY MEDICINE

## 2022-01-01 PROCEDURE — 84484 ASSAY OF TROPONIN QUANT: CPT | Performed by: EMERGENCY MEDICINE

## 2022-01-01 PROCEDURE — 83880 ASSAY OF NATRIURETIC PEPTIDE: CPT | Performed by: EMERGENCY MEDICINE

## 2022-01-01 PROCEDURE — 93005 ELECTROCARDIOGRAM TRACING: CPT

## 2022-01-01 PROCEDURE — 99285 EMERGENCY DEPT VISIT HI MDM: CPT | Performed by: EMERGENCY MEDICINE

## 2022-01-01 PROCEDURE — 36415 COLL VENOUS BLD VENIPUNCTURE: CPT | Performed by: EMERGENCY MEDICINE

## 2022-01-01 PROCEDURE — 99283 EMERGENCY DEPT VISIT LOW MDM: CPT

## 2022-01-01 PROCEDURE — U0003 INFECTIOUS AGENT DETECTION BY NUCLEIC ACID (DNA OR RNA); SEVERE ACUTE RESPIRATORY SYNDROME CORONAVIRUS 2 (SARS-COV-2) (CORONAVIRUS DISEASE [COVID-19]), AMPLIFIED PROBE TECHNIQUE, MAKING USE OF HIGH THROUGHPUT TECHNOLOGIES AS DESCRIBED BY CMS-2020-01-R: HCPCS | Performed by: EMERGENCY MEDICINE

## 2022-01-01 NOTE — DISCHARGE INSTRUCTIONS
Return to the ER with any new, concerning, worsening issues  Continue over-the-counter cough and cold medicines as needed  If her COVID test is positive you will be contacted  Please feel free to down below the [employer] A on your phone to get the results  If you are positive however you be called  Here some basic COVID instructions and K should become positive: It is very important that you follow-up with your family doctor in 24-48 hours, and return to the ER for any new, concerning, or worsening issues  Please start taking:    · Vitamin D3 2000 IU orally daily    · Vitamin C 1g orally every 12 hours  · A Multivitamin Daily    Consider an online purchase of a pulse-oximeter monitor, to read your oxygen levels on your finger      · Recommend returning if SpO2 (oxygen saturation) drops below 90%, or below 94% if:    - Your age is greater than or equals 70  - You are overweight  - You have cancer  - You have kidney disease  - You are immunocompromised  - You have chronic heart or lung disease  - You're diabetic, or   - You are a smoker

## 2022-01-01 NOTE — ED PROVIDER NOTES
History  Chief Complaint   Patient presents with    Nasal Congestion     20-year-old male presents to the emergency room complaining of mild shortness of breath and cough and nasal congestion  Patient notes he still does not feel well after week  Patient's daughter notes that she got a home COVID swab which was negative  But she notes that the patient is not feeling any better so they brought the patient here for evaluation  Patient denies chest pain or weakness  Patient has not had any sick contacts and notes he is COVID vaccinated  Prior to Admission Medications   Prescriptions Last Dose Informant Patient Reported? Taking?    Diclofenac Sodium (VOLTAREN) 1 %   No No   Sig: Apply 2 g topically 4 (four) times a day   Heparin Sodium, Porcine, (HEPARIN, PORCINE,) 5,000 units/mL   No No   Sig: Inject 1 mL (5,000 Units total) under the skin every 8 (eight) hours   Patient not taking: Reported on 2/13/2020   acetaminophen (TYLENOL) 325 mg tablet   No No   Sig: Take 2 tablets (650 mg total) by mouth every 6 (six) hours as needed for mild pain or fever   allopurinol (ZYLOPRIM) 100 mg tablet   Yes No   Sig: Take 100 mg by mouth daily   Patient not taking: Reported on 8/9/2021   atorvastatin (LIPITOR) 10 mg tablet   Yes No   Sig: Take 40 mg by mouth daily    carvedilol (COREG) 6 25 mg tablet   No No   Sig: Take 1 tablet by mouth twice daily with food   esomeprazole (NexIUM) 20 mg capsule   Yes No   Sig: Take 20 mg by mouth every morning before breakfast   finasteride (PROSCAR) 5 mg tablet   Yes No   Sig: Take 5 mg by mouth daily   glipiZIDE (GLUCOTROL) 10 mg tablet   Yes No   Sig: Take by mouth 2 (two) times a day before meals   levETIRAcetam (KEPPRA) 500 mg tablet   Yes No   Sig: Take 500 mg by mouth every 12 (twelve) hours   lisinopril (ZESTRIL) 2 5 mg tablet   Yes No   metFORMIN (GLUCOPHAGE) 500 mg tablet   Yes No   Sig: Take 500 mg by mouth 3 (three) times a day before meals   Patient not taking: Reported on 8/9/2021   methocarbamol (ROBAXIN) 500 mg tablet   Yes No   Sig: Take 500 mg by mouth 3 (three) times a day   oxyCODONE (ROXICODONE) 5 mg immediate release tablet   Yes No   Sig: TAKE 1 TABLET BY MOUTH EVERY 6 HOURS AS NEEDED FOR SEVERE PAIN (PAIN SCORE 7 10) MAX DAILY AMOUNT 20 MG   pregabalin (LYRICA) 50 mg capsule   No No   Sig: Take 1 capsule (50 mg total) by mouth 3 (three) times a day   sertraline (ZOLOFT) 100 mg tablet   Yes No   Sig: Take 50 mg by mouth daily   Patient not taking: Reported on 8/9/2021   tamsulosin (FLOMAX) 0 4 mg   Yes No   Sig: Take 0 4 mg by mouth daily with dinner      Facility-Administered Medications: None       Past Medical History:   Diagnosis Date    Atrioventricular block, Mobitz type 2     s/p BiV Medtronic pacemaker 9-    CAD (coronary artery disease)     s/p stent 1997; s/p CABG x3 LIMA-D1 and LAD, SV- LV of RCA 12/18/2012    Carotid artery stenosis 01/16/2014    Heterogeneous atheroma at the origin of the internal carotid arteries bilaterally causing a mild degree of stenosis on each side   [facility] [address] [facility]     Diabetes mellitus ([facility] [address] [facility]     History of echocardiogram 01/16/2014    EF 35-40%, technically difficult study   Hyperlipidemia     Hypertension     Ischemic cardiomyopathy     PAF (paroxysmal atrial fibrillation) (HCC)     Seizures     Skin cancer     Stroke [facility])        Past Surgical History:   Procedure Laterality Date    CARDIAC PACEMAKER PLACEMENT  09/13/2018    BiV Medtronic pacemaker    CORONARY ARTERY BYPASS GRAFT  12/18/2012    CABG x3 LIMA-D1 and LAD, SV- LV of RCA        Family History   Problem Relation Age of Onset    Alzheimer's disease Mother     Stroke Father     Suicide Attempts Son      I have reviewed and agree with the history as documented      E-Cigarette/Vaping    E-Cigarette Use Never User      E-Cigarette/Vaping Substances     Social History     Tobacco Use    Smoking status: Former Smoker     Quit date: 1988     Years since quittin 0    Smokeless tobacco: Never Used   Vaping Use    Vaping Use: Never used   Substance Use Topics    Alcohol use: Never    Drug use: Never       Review of Systems   Constitutional: Negative for chills and fever  HENT: Positive for congestion  Negative for ear pain and sore throat  Eyes: Negative for pain and visual disturbance  Respiratory: Positive for cough and shortness of breath  Cardiovascular: Negative for chest pain and palpitations  Gastrointestinal: Negative for abdominal pain and vomiting  Genitourinary: Negative for dysuria and hematuria  Musculoskeletal: Negative for arthralgias and back pain  Skin: Negative for color change and rash  Neurological: Negative for seizures and syncope  All other systems reviewed and are negative  Physical Exam  Physical Exam  Constitutional:       General: He is not in acute distress  Appearance: Normal appearance  He is normal weight  He is not ill-appearing  HENT:      Head: Normocephalic and atraumatic  Right Ear: External ear normal       Left Ear: External ear normal       Nose: Nose normal       Mouth/Throat:      Mouth: Mucous membranes are moist    Eyes:      Conjunctiva/sclera: Conjunctivae normal    Cardiovascular:      Rate and Rhythm: Normal rate and regular rhythm  Pulses: Normal pulses  Heart sounds: Normal heart sounds  Pulmonary:      Effort: Pulmonary effort is normal       Breath sounds: Normal breath sounds  Abdominal:      General: Abdomen is flat  There is no distension  Palpations: Abdomen is soft  There is no mass  Musculoskeletal:         General: No swelling, tenderness or deformity  Normal range of motion  Cervical back: Normal range of motion  Skin:     General: Skin is warm and dry  Capillary Refill: Capillary refill takes 2 to 3 seconds  Coloration: Skin is not pale  Neurological:      General: No focal deficit present        Mental Status: He is alert and oriented to person, place, and time  Mental status is at baseline     Psychiatric:         Mood and Affect: Mood normal          Vital Signs  ED Triage Vitals [01/01/22 1314]   Temperature Pulse Respirations Blood Pressure SpO2   98 °F (36 7 °C) 63 18 113/58 98 %      Temp Source Heart Rate Source Patient Position - Orthostatic VS BP Location FiO2 (%)   Tympanic Monitor Sitting Right arm --      Pain Score       --           Vitals:    01/01/22 1314 01/01/22 1551   BP: 113/58 137/72   Pulse: 63 64   Patient Position - Orthostatic VS: Sitting          Visual Acuity      ED Medications  Medications - No data to display    Diagnostic Studies  Results Reviewed     Procedure Component Value Units Date/Time    HS Troponin I 4hr [[id number]]     Lab Status: No result Specimen: Blood     HS Troponin 0hr (reflex protocol) [[id number]]  (Normal) Collected: 01/01/22 1359    Lab Status: Final result Specimen: Blood from Arm, Left Updated: 01/01/22 1456     hs TnI 0hr 13 ng/L     HS Troponin I 2hr [[id number]]     Lab Status: No result Specimen: Blood     B-Type Natriuretic Peptide(BNP) CA, , [facility] Only [[id number]]  (Abnormal) Collected: 01/01/22 1359    Lab Status: Final result Specimen: Blood from Arm, Left Updated: 01/01/22 1445      pg/mL     Basic metabolic panel [[id number]]  (Abnormal) Collected: 01/01/22 1359    Lab Status: Final result Specimen: Blood from Arm, Left Updated: 01/01/22 1443     Sodium 137 mmol/L      Potassium 4 0 mmol/L      Chloride 104 mmol/L      CO2 25 mmol/L      ANION GAP 8 mmol/L      BUN 25 mg/dL      Creatinine 1 49 mg/dL      Glucose 121 mg/dL      Calcium 9 3 mg/dL      eGFR 42 ml/min/1 73sq m     Narrative:      [facility] guidelines for Chronic Kidney Disease (CKD):     Stage 1 with normal or high GFR (GFR > 90 mL/min/1 73 square meters)    Stage 2 Mild CKD (GFR = 60-89 mL/min/1 73 square meters)    Stage 3A Moderate CKD (GFR = 45-59 mL/min/1 73 square meters)    Stage 3B Moderate CKD (GFR = 30-44 mL/min/1 73 square meters)    Stage 4 Severe CKD (GFR = 15-29 mL/min/1 73 square meters)    Stage 5 End Stage CKD (GFR <15 mL/min/1 73 square meters)  Note: GFR calculation is accurate only with a steady state creatinine    COVID only - 48 hour TAT [[id number]] Collected: 01/01/22 1359    Lab Status: In process Specimen: Nares from Nose Updated: 01/01/22 1413                 XR chest 1 view portable   ED Interpretation by Smita Coffey DO (01/01 1450)   Cardiomegaly and faint pulmonary vascular congestion                 Procedures  ECG 12 Lead Documentation Only    Date/Time: 1/1/2022 2:14 PM  Performed by: Smita Coffey DO  Authorized by: Smita Coffey DO     ECG reviewed by me, the ED Provider: yes    Patient location:  ED  Comments:      EKG shows a paced ventricular rhythm rate of 67 per there is right axis deviation and occasional PVCs noted  There is questionable old anterior septal MI  There is no definitive acute ST or T-wave changes noted  There is nonspecific T-wave flattening inferiorly  ED Course  ED Course as of 01/01/22 1649   Sat Jan 01, 2022   1449 Creatinine(!): 1 49   1519 Initial troponin of 13  Patient has had symptoms for over a week so doubt acute cardiac injury                                             MDM    Disposition  Final diagnoses:   Viral upper respiratory tract infection     Time reflects when diagnosis was documented in both MDM as applicable and the Disposition within this note     Time User Action Codes Description Comment    1/1/2022  3:41 PM Mj Dunn Add [J06 9] Viral upper respiratory tract infection       ED Disposition     ED Disposition Condition Date/Time Comment    Discharge Stable Sat Jan 1, 2022  3:42 PM Lucrecia Quiles Link discharge to home/self care              Follow-up Information    None         Discharge Medication List as of 1/1/2022  3:42 PM      CONTINUE these medications which have NOT CHANGED    Details   acetaminophen (TYLENOL) 325 mg tablet Take 2 tablets (650 mg total) by mouth every 6 (six) hours as needed for mild pain or fever, Starting Mon 1/27/2020, Normal      allopurinol (ZYLOPRIM) 100 mg tablet Take 100 mg by mouth daily, Historical Med      atorvastatin (LIPITOR) 10 mg tablet Take 40 mg by mouth daily , Historical Med      carvedilol (COREG) 6 25 mg tablet Take 1 tablet by mouth twice daily with food, Normal      Diclofenac Sodium (VOLTAREN) 1 % Apply 2 g topically 4 (four) times a day, Starting Wed 5/5/2021, Normal      esomeprazole (NexIUM) 20 mg capsule Take 20 mg by mouth every morning before breakfast, Historical Med      finasteride (PROSCAR) 5 mg tablet Take 5 mg by mouth daily, Historical Med      glipiZIDE (GLUCOTROL) 10 mg tablet Take by mouth 2 (two) times a day before meals, Historical Med      Heparin Sodium, Porcine, (HEPARIN, PORCINE,) 5,000 units/mL Inject 1 mL (5,000 Units total) under the skin every 8 (eight) hours, Starting Mon 1/27/2020, Normal      levETIRAcetam (KEPPRA) 500 mg tablet Take 500 mg by mouth every 12 (twelve) hours, Historical Med      lisinopril (ZESTRIL) 2 5 mg tablet Starting Sun 2/2/2020, Historical Med      metFORMIN (GLUCOPHAGE) 500 mg tablet Take 500 mg by mouth 3 (three) times a day before meals, Historical Med      methocarbamol (ROBAXIN) 500 mg tablet Take 500 mg by mouth 3 (three) times a day, Historical Med      oxyCODONE (ROXICODONE) 5 mg immediate release tablet TAKE 1 TABLET BY MOUTH EVERY 6 HOURS AS NEEDED FOR SEVERE PAIN (PAIN SCORE 7 10) MAX DAILY AMOUNT 20 MG, Historical Med      pregabalin (LYRICA) 50 mg capsule Take 1 capsule (50 mg total) by mouth 3 (three) times a day, Starting Mon 8/9/2021, Until Wed 9/8/2021, Normal      sertraline (ZOLOFT) 100 mg tablet Take 50 mg by mouth daily, Historical Med      tamsulosin (FLOMAX) 0 4 mg Take 0 4 mg by mouth daily with dinner, Historical Med No discharge procedures on file      PDMP Review       Value Time User    PDMP Reviewed  Yes 1/27/2020 12:57 PM [id number], [address]          ED Provider  Electronically Signed by           Darina Primrose , DO  01/01/22 5220

## 2022-01-02 LAB
ATRIAL RATE: 70 BPM
QRS AXIS: 155 DEGREES
QRSD INTERVAL: 144 MS
QT INTERVAL: 456 MS
QTC INTERVAL: 481 MS
SARS-COV-2 RNA RESP QL NAA+PROBE: NEGATIVE
T WAVE AXIS: -36 DEGREES
VENTRICULAR RATE: 67 BPM

## 2022-01-02 PROCEDURE — 93010 ELECTROCARDIOGRAM REPORT: CPT | Performed by: INTERNAL MEDICINE

## 2022-03-23 ENCOUNTER — APPOINTMENT (EMERGENCY)
Dept: RADIOLOGY | Facility: HOSPITAL | Age: 82
End: 2022-03-23
Payer: COMMERCIAL

## 2022-03-23 ENCOUNTER — HOSPITAL ENCOUNTER (EMERGENCY)
Facility: HOSPITAL | Age: 82
Discharge: HOME | End: 2022-03-23
Attending: EMERGENCY MEDICINE
Payer: COMMERCIAL

## 2022-03-23 VITALS
RESPIRATION RATE: 18 BRPM | OXYGEN SATURATION: 98 % | DIASTOLIC BLOOD PRESSURE: 60 MMHG | TEMPERATURE: 97.7 F | HEART RATE: 60 BPM | SYSTOLIC BLOOD PRESSURE: 127 MMHG | BODY MASS INDEX: 27.35 KG/M2 | WEIGHT: 191 LBS | HEIGHT: 70 IN

## 2022-03-23 DIAGNOSIS — R05.9 COUGH: Primary | ICD-10-CM

## 2022-03-23 LAB
ANION GAP SERPL CALCULATED.3IONS-SCNC: 7 MMOL/L (ref 4–13)
BASOPHILS # BLD AUTO: 0.05 THOUSANDS/ΜL (ref 0–0.1)
BASOPHILS NFR BLD AUTO: 1 % (ref 0–1)
BUN SERPL-MCNC: 27 MG/DL (ref 5–25)
CALCIUM SERPL-MCNC: 9 MG/DL (ref 8.4–10.2)
CHLORIDE SERPL-SCNC: 107 MMOL/L (ref 96–108)
CO2 SERPL-SCNC: 26 MMOL/L (ref 21–32)
CREAT SERPL-MCNC: 1.25 MG/DL (ref 0.6–1.3)
EOSINOPHIL # BLD AUTO: 0.14 THOUSAND/ΜL (ref 0–0.61)
EOSINOPHIL NFR BLD AUTO: 2 % (ref 0–6)
ERYTHROCYTE [DISTWIDTH] IN BLOOD BY AUTOMATED COUNT: 18.5 % (ref 11.6–15.1)
FLUAV RNA RESP QL NAA+PROBE: NEGATIVE
FLUBV RNA RESP QL NAA+PROBE: NEGATIVE
GFR SERPL CREATININE-BSD FRML MDRD: 52 ML/MIN/1.73SQ M
GLUCOSE SERPL-MCNC: 99 MG/DL (ref 65–140)
HCT VFR BLD AUTO: 34.1 % (ref 36.5–49.3)
HGB BLD-MCNC: 11 G/DL (ref 12–17)
IMM GRANULOCYTES # BLD AUTO: 0.02 THOUSAND/UL (ref 0–0.2)
IMM GRANULOCYTES NFR BLD AUTO: 0 % (ref 0–2)
LYMPHOCYTES # BLD AUTO: 0.97 THOUSANDS/ΜL (ref 0.6–4.47)
LYMPHOCYTES NFR BLD AUTO: 16 % (ref 14–44)
MCH RBC QN AUTO: 34.2 PG (ref 26.8–34.3)
MCHC RBC AUTO-ENTMCNC: 32.3 G/DL (ref 31.4–37.4)
MCV RBC AUTO: 106 FL (ref 82–98)
MONOCYTES # BLD AUTO: 0.76 THOUSAND/ΜL (ref 0.17–1.22)
MONOCYTES NFR BLD AUTO: 13 % (ref 4–12)
NEUTROPHILS # BLD AUTO: 4.12 THOUSANDS/ΜL (ref 1.85–7.62)
NEUTS SEG NFR BLD AUTO: 68 % (ref 43–75)
NRBC BLD AUTO-RTO: 0 /100 WBCS
PLATELET # BLD AUTO: 134 THOUSANDS/UL (ref 149–390)
PMV BLD AUTO: 11.4 FL (ref 8.9–12.7)
POTASSIUM SERPL-SCNC: 4.6 MMOL/L (ref 3.5–5.3)
RBC # BLD AUTO: 3.22 MILLION/UL (ref 3.88–5.62)
RSV RNA RESP QL NAA+PROBE: NEGATIVE
SARS-COV-2 RNA RESP QL NAA+PROBE: NEGATIVE
SODIUM SERPL-SCNC: 140 MMOL/L (ref 135–147)
WBC # BLD AUTO: 6.06 THOUSAND/UL (ref 4.31–10.16)

## 2022-03-23 PROCEDURE — 99284 EMERGENCY DEPT VISIT MOD MDM: CPT | Performed by: PHYSICIAN ASSISTANT

## 2022-03-23 PROCEDURE — 80048 BASIC METABOLIC PNL TOTAL CA: CPT | Performed by: PHYSICIAN ASSISTANT

## 2022-03-23 PROCEDURE — 96360 HYDRATION IV INFUSION INIT: CPT

## 2022-03-23 PROCEDURE — 36415 COLL VENOUS BLD VENIPUNCTURE: CPT | Performed by: PHYSICIAN ASSISTANT

## 2022-03-23 PROCEDURE — 0241U HB NFCT DS VIR RESP RNA 4 TRGT: CPT | Performed by: PHYSICIAN ASSISTANT

## 2022-03-23 PROCEDURE — 99284 EMERGENCY DEPT VISIT MOD MDM: CPT

## 2022-03-23 PROCEDURE — 93005 ELECTROCARDIOGRAM TRACING: CPT

## 2022-03-23 PROCEDURE — 85025 COMPLETE CBC W/AUTO DIFF WBC: CPT | Performed by: PHYSICIAN ASSISTANT

## 2022-03-23 PROCEDURE — 71045 X-RAY EXAM CHEST 1 VIEW: CPT

## 2022-03-23 RX ORDER — AZITHROMYCIN 250 MG/1
500 TABLET, FILM COATED ORAL ONCE
Status: COMPLETED | OUTPATIENT
Start: 2022-03-23 | End: 2022-03-23

## 2022-03-23 RX ORDER — AZITHROMYCIN 250 MG/1
250 TABLET, FILM COATED ORAL EVERY 24 HOURS
Qty: 4 TABLET | Refills: 0 | Status: SHIPPED | OUTPATIENT
Start: 2022-03-24 | End: 2022-03-28

## 2022-03-23 RX ADMIN — SODIUM CHLORIDE 500 ML: 0.9 INJECTION, SOLUTION INTRAVENOUS at 19:26

## 2022-03-23 RX ADMIN — AZITHROMYCIN MONOHYDRATE 500 MG: 250 TABLET ORAL at 20:18

## 2022-03-23 NOTE — ED PROVIDER NOTES
History  Chief Complaint   Patient presents with    Cough     68-year-old male history of hypertension, hyperlipidemia, CAD presents accompanied by his daughter-in-law complaining of cough  Patient reports that it is been present for the past few days ever since he was outside raking leaves  He reports that it is minimally productive  Also reports having some diarrhea that was watery nonbloody over the past few days that has since resolved  He denies any fevers, chills, chest pain, shortness of breath, pleuritic pain, abdominal pain, nausea, vomiting, sweats, melena hematochezia any urinary burning or frequency  Prior to Admission Medications   Prescriptions Last Dose Informant Patient Reported? Taking?    Diclofenac Sodium (VOLTAREN) 1 %   No No   Sig: Apply 2 g topically 4 (four) times a day   Heparin Sodium, Porcine, (HEPARIN, PORCINE,) 5,000 units/mL   No No   Sig: Inject 1 mL (5,000 Units total) under the skin every 8 (eight) hours   Patient not taking: Reported on 2/13/2020   acetaminophen (TYLENOL) 325 mg tablet   No No   Sig: Take 2 tablets (650 mg total) by mouth every 6 (six) hours as needed for mild pain or fever   allopurinol (ZYLOPRIM) 100 mg tablet   Yes No   Sig: Take 100 mg by mouth daily   Patient not taking: Reported on 8/9/2021   atorvastatin (LIPITOR) 10 mg tablet   Yes No   Sig: Take 40 mg by mouth daily    carvedilol (COREG) 6 25 mg tablet   No No   Sig: Take 1 tablet by mouth twice daily with food   esomeprazole (NexIUM) 20 mg capsule   Yes No   Sig: Take 20 mg by mouth every morning before breakfast   finasteride (PROSCAR) 5 mg tablet   Yes No   Sig: Take 5 mg by mouth daily   glipiZIDE (GLUCOTROL) 10 mg tablet   Yes No   Sig: Take by mouth 2 (two) times a day before meals   levETIRAcetam (KEPPRA) 500 mg tablet   Yes No   Sig: Take 500 mg by mouth every 12 (twelve) hours   lisinopril (ZESTRIL) 2 5 mg tablet   Yes No   metFORMIN (GLUCOPHAGE) 500 mg tablet   Yes No   Sig: Take 500 mg by mouth 3 (three) times a day before meals   Patient not taking: Reported on 8/9/2021   methocarbamol (ROBAXIN) 500 mg tablet   Yes No   Sig: Take 500 mg by mouth 3 (three) times a day   oxyCODONE (ROXICODONE) 5 mg immediate release tablet   Yes No   Sig: TAKE 1 TABLET BY MOUTH EVERY 6 HOURS AS NEEDED FOR SEVERE PAIN (PAIN SCORE 7 10) MAX DAILY AMOUNT 20 MG   pregabalin (LYRICA) 50 mg capsule   No No   Sig: Take 1 capsule (50 mg total) by mouth 3 (three) times a day   sertraline (ZOLOFT) 100 mg tablet   Yes No   Sig: Take 50 mg by mouth daily   Patient not taking: Reported on 8/9/2021   tamsulosin (FLOMAX) 0 4 mg   Yes No   Sig: Take 0 4 mg by mouth daily with dinner      Facility-Administered Medications: None       Past Medical History:   Diagnosis Date    Atrioventricular block, Mobitz type 2     s/p BiV Medtronic pacemaker 9-    CAD (coronary artery disease)     s/p stent 1997; s/p CABG x3 LIMA-D1 and LAD, SV- LV of RCA 12/18/2012    Carotid artery stenosis 01/16/2014    Heterogeneous atheroma at the origin of the internal carotid arteries bilaterally causing a mild degree of stenosis on each side   [facility] [address] [facility]     Diabetes mellitus ([facility] [address] [facility]     History of echocardiogram 01/16/2014    EF 35-40%, technically difficult study   Hyperlipidemia     Hypertension     Ischemic cardiomyopathy     [facility]     Seizures     Skin cancer     Stroke [facility])        Past Surgical History:   Procedure Laterality Date    CARDIAC PACEMAKER PLACEMENT  09/13/2018    BiV Medtronic pacemaker    CORONARY ARTERY BYPASS GRAFT  12/18/2012    CABG x3 LIMA-D1 and LAD, SV- LV of RCA        Family History   Problem Relation Age of Onset    Alzheimer's disease Mother     Stroke Father     Suicide Attempts Son      I have reviewed and agree with the history as documented      E-Cigarette/Vaping    E-Cigarette Use Never User      E-Cigarette/Vaping Substances     Social History Tobacco Use    Smoking status: Former Smoker     Quit date:      Years since quittin 2    Smokeless tobacco: Never Used   Vaping Use    Vaping Use: Never used   Substance Use Topics    Alcohol use: Never    Drug use: Never       Review of Systems   Constitutional: Negative for chills, fatigue and fever  HENT: Negative for congestion and sore throat  Eyes: Negative for pain  Respiratory: Positive for cough  Negative for chest tightness, shortness of breath and wheezing  Cardiovascular: Negative for chest pain, palpitations and leg swelling  Gastrointestinal: Positive for diarrhea  Negative for abdominal pain, constipation, nausea and vomiting  Endocrine: Negative for polyuria  Genitourinary: Negative for dysuria  Musculoskeletal: Negative for arthralgias, back pain, myalgias and neck pain  Skin: Negative for rash  Neurological: Negative for dizziness, syncope, light-headedness and headaches  All other systems reviewed and are negative  Physical Exam  Physical Exam  Vitals reviewed  Constitutional:       Appearance: Normal appearance  He is well-developed  HENT:      Head: Normocephalic and atraumatic  Mouth/Throat:      Mouth: Mucous membranes are moist    Eyes:      Conjunctiva/sclera: Conjunctivae normal    Cardiovascular:      Rate and Rhythm: Normal rate and regular rhythm  Heart sounds: Normal heart sounds  Pulmonary:      Effort: Pulmonary effort is normal       Breath sounds: Normal breath sounds  Abdominal:      General: Bowel sounds are normal       Palpations: Abdomen is soft  Tenderness: There is no abdominal tenderness  Musculoskeletal:         General: Normal range of motion  Cervical back: Normal range of motion  Skin:     General: Skin is warm and dry  Capillary Refill: Capillary refill takes less than 2 seconds  Neurological:      General: No focal deficit present        Mental Status: He is alert and oriented to person, place, and time  Psychiatric:         Mood and Affect: Mood normal          Behavior: Behavior normal          Vital Signs  ED Triage Vitals [03/23/22 1736]   Temperature Pulse Respirations Blood Pressure SpO2   97 7 °F (36 5 °C) 73 18 138/74 97 %      Temp src Heart Rate Source Patient Position - Orthostatic VS BP Location FiO2 (%)   -- -- Sitting -- --      Pain Score       No Pain           Vitals:    03/23/22 1736 03/23/22 1800 03/23/22 1900 03/23/22 2000   BP: 138/74 127/60     Pulse: 73 60 60 60   Patient Position - Orthostatic VS: Sitting            Visual Acuity      ED Medications  Medications   sodium chloride 0 9 % bolus 500 mL (0 mL Intravenous Stopped 3/23/22 2030)   azithromycin (ZITHROMAX) tablet 500 mg (500 mg Oral Given 3/23/22 2018)       Diagnostic Studies  Results Reviewed     Procedure Component Value Units Date/Time    COVID/FLU/RSV [[id number]]  (Normal) Collected: 03/23/22 1855    Lab Status: Final result Specimen: Nasopharyngeal Swab Updated: 03/23/22 1939     SARS-CoV-2 Negative     INFLUENZA A PCR Negative     INFLUENZA B PCR Negative     RSV PCR Negative    Narrative:      FOR PEDIATRIC PATIENTS - copy/paste COVID Guidelines URL to browser: [email or web address]  [facility]    SARS-CoV-2 assay is a Nucleic Acid Amplification assay intended for the  qualitative detection of nucleic acid from SARS-CoV-2 in nasopharyngeal  swabs  Results are for the presumptive identification of SARS-CoV-2 RNA  Positive results are indicative of infection with SARS-CoV-2, the virus  causing COVID-19, but do not rule out bacterial infection or co-infection  with other viruses  Laboratories within the [place] and its  territories are required to report all positive results to the appropriate  public health authorities   Negative results do not preclude SARS-CoV-2  infection and should not be used as the sole basis for treatment or other  patient management decisions  Negative results must be combined with  clinical observations, patient history, and epidemiological information  This test has not been FDA cleared or approved  This test has been authorized by FDA under an Emergency Use Authorization  (EUA)  This test is only authorized for the duration of time the  declaration that circumstances exist justifying the authorization of the  emergency use of an in vitro diagnostic tests for detection of SARS-CoV-2  virus and/or diagnosis of COVID-19 infection under section 564(b)(1) of  the Act, 21 U  S C  141WDD-4(A)(4), unless the authorization is terminated  or revoked sooner  The test has been validated but independent review by FDA  and CLIA is pending  Test performed using [employer] Gene[employer]pert: This RT-PCR assay targets N2,  a region unique to SARS-CoV-2  A conserved region in the E-gene was chosen  for pan-Sarbecovirus detection which includes SARS-CoV-2      Basic metabolic panel [[id number]]  (Abnormal) Collected: 03/23/22 1855    Lab Status: Final result Specimen: Blood from Arm, Right Updated: 03/23/22 1928     Sodium 140 mmol/L      Potassium 4 6 mmol/L      Chloride 107 mmol/L      CO2 26 mmol/L      ANION GAP 7 mmol/L      BUN 27 mg/dL      Creatinine 1 25 mg/dL      Glucose 99 mg/dL      Calcium 9 0 mg/dL      eGFR 52 ml/min/1 73sq m     Narrative:      [place] guidelines for Chronic Kidney Disease (CKD):     Stage 1 with normal or high GFR (GFR > 90 mL/min/1 73 square meters)    Stage 2 Mild CKD (GFR = 60-89 mL/min/1 73 square meters)    Stage 3A Moderate CKD (GFR = 45-59 mL/min/1 73 square meters)    Stage 3B Moderate CKD (GFR = 30-44 mL/min/1 73 square meters)    Stage 4 Severe CKD (GFR = 15-29 mL/min/1 73 square meters)    Stage 5 End Stage CKD (GFR <15 mL/min/1 73 square meters)  Note: GFR calculation is accurate only with a steady state creatinine    CBC and differential [[id number]]  (Abnormal) Collected: 03/23/22 1855    Lab Status: Final result Specimen: Blood from Arm, Right Updated: 03/23/22 1915     WBC 6 06 Thousand/uL      RBC 3 22 Million/uL      Hemoglobin 11 0 g/dL      Hematocrit 34 1 %       fL      MCH 34 2 pg      MCHC 32 3 g/dL      RDW 18 5 %      MPV 11 4 fL      Platelets 307 Thousands/uL      nRBC 0 /100 WBCs      Neutrophils Relative 68 %      Immat GRANS % 0 %      Lymphocytes Relative 16 %      Monocytes Relative 13 %      Eosinophils Relative 2 %      Basophils Relative 1 %      Neutrophils Absolute 4 12 Thousands/µL      Immature Grans Absolute 0 02 Thousand/uL      Lymphocytes Absolute 0 97 Thousands/µL      Monocytes Absolute 0 76 Thousand/µL      Eosinophils Absolute 0 14 Thousand/µL      Basophils Absolute 0 05 Thousands/µL                  XR chest 1 view portable    (Results Pending)              Procedures  Procedures         ED Course                                             MDM  Number of Diagnoses or Management Options  Cough  Diagnosis management comments: Patient states he came to the emergency department because his PCP did not having available appointments  Clinically completely nontoxic  Well appearing  Vital stable throughout stay in the ED  Offered admission for observation if patient did not feel comfortable complaining home but patient states he prefers to go home and follow up with his PCP  Given patient's history of pneumonia, will cover with azithromycin  I personally had a lengthy discussion with the patient in regards to specific signs and symptoms to watch out for that warrant prompt return to the ED  Patient was given specific recommendations for symptomatic treatment and follow-up recommendations  The patient expressed their understanding, all questions were answered and they were agreeable to plan and very thankful for care          Disposition  Final diagnoses:   Cough     Time reflects when diagnosis was documented in both MDM as applicable and the Disposition within this note     Time User Action Codes Description Comment    3/23/2022  8:10 PM Hope Cm Add [R05 9] Cough       ED Disposition     ED Disposition Condition Date/Time Comment    Discharge Stable Wed Mar 23, 2022  8:10 PM Lana Kauffman Link discharge to home/self care              Follow-up Information     Follow up With Specialties Details Why Contact Info    Anitra Roblero DO Family Medicine Schedule an appointment as soon as possible for a visit   [facility]  [phone]            Discharge Medication List as of 3/23/2022  8:13 PM      START taking these medications    Details   azithromycin (ZITHROMAX) 250 mg tablet Take 1 tablet (250 mg total) by mouth every 24 hours for 4 days, Starting Thu 3/24/2022, Until Mon 3/28/2022, Normal         CONTINUE these medications which have NOT CHANGED    Details   acetaminophen (TYLENOL) 325 mg tablet Take 2 tablets (650 mg total) by mouth every 6 (six) hours as needed for mild pain or fever, Starting Mon 1/27/2020, Normal      allopurinol (ZYLOPRIM) 100 mg tablet Take 100 mg by mouth daily, Historical Med      atorvastatin (LIPITOR) 10 mg tablet Take 40 mg by mouth daily , Historical Med      carvedilol (COREG) 6 25 mg tablet Take 1 tablet by mouth twice daily with food, Normal      Diclofenac Sodium (VOLTAREN) 1 % Apply 2 g topically 4 (four) times a day, Starting Wed 5/5/2021, Normal      esomeprazole (NexIUM) 20 mg capsule Take 20 mg by mouth every morning before breakfast, Historical Med      finasteride (PROSCAR) 5 mg tablet Take 5 mg by mouth daily, Historical Med      glipiZIDE (GLUCOTROL) 10 mg tablet Take by mouth 2 (two) times a day before meals, Historical Med      Heparin Sodium, Porcine, (HEPARIN, PORCINE,) 5,000 units/mL Inject 1 mL (5,000 Units total) under the skin every 8 (eight) hours, Starting Mon 1/27/2020, Normal      levETIRAcetam (KEPPRA) 500 mg tablet Take 500 mg by mouth every 12 (twelve) hours, Historical Med      lisinopril (ZESTRIL) 2 5 mg tablet Starting Sun 2/2/2020, Historical Med      metFORMIN (GLUCOPHAGE) 500 mg tablet Take 500 mg by mouth 3 (three) times a day before meals, Historical Med      methocarbamol (ROBAXIN) 500 mg tablet Take 500 mg by mouth 3 (three) times a day, Historical Med      oxyCODONE (ROXICODONE) 5 mg immediate release tablet TAKE 1 TABLET BY MOUTH EVERY 6 HOURS AS NEEDED FOR SEVERE PAIN (PAIN SCORE 7 10) MAX DAILY AMOUNT 20 MG, Historical Med      pregabalin (LYRICA) 50 mg capsule Take 1 capsule (50 mg total) by mouth 3 (three) times a day, Starting Mon 8/9/2021, Until Wed 9/8/2021, Normal      sertraline (ZOLOFT) 100 mg tablet Take 50 mg by mouth daily, Historical Med      tamsulosin (FLOMAX) 0 4 mg Take 0 4 mg by mouth daily with dinner, Historical Med             No discharge procedures on file      PDMP Review       Value Time User    PDMP Reviewed  Yes 1/27/2020 12:57 PM Lorie Franco [facility] Provider  Electronically Signed by           Eddi Lai PA-C  03/23/22 2045

## 2022-03-24 LAB
ATRIAL RATE: 36 BPM
P AXIS: -60 DEGREES
PR INTERVAL: 160 MS
QRS AXIS: 169 DEGREES
QRSD INTERVAL: 134 MS
QT INTERVAL: 462 MS
QTC INTERVAL: 491 MS
T WAVE AXIS: 33 DEGREES
VENTRICULAR RATE: 68 BPM

## 2022-03-24 PROCEDURE — 93010 ELECTROCARDIOGRAM REPORT: CPT | Performed by: INTERNAL MEDICINE

## 2022-03-24 NOTE — DISCHARGE INSTRUCTIONS
Take antibiotic as directed  Stay well hydrated  Follow-up with family doctor and return to the emergency department if your symptoms significantly change or worsen

## 2022-03-25 NOTE — ED ATTENDING ATTESTATION
3/23/2022  I, Jeannie Ramos MD, have discussed the patient with the resident/non-physician practitioner and agree with the resident's/non-physician practitioner's findings, Plan of Care, and MDM as documented in the resident's/non-physician practitioner's note, except where noted  All available labs and Radiology studies were reviewed  I was present for key portions of any procedure(s) performed by the resident/non-physician practitioner and I was immediately available to provide assistance  At this point I agree with the current assessment done in the Emergency Department

## 2022-03-27 ENCOUNTER — APPOINTMENT (EMERGENCY)
Dept: RADIOLOGY | Facility: HOSPITAL | Age: 82
End: 2022-03-27
Payer: COMMERCIAL

## 2022-03-27 ENCOUNTER — HOSPITAL ENCOUNTER (EMERGENCY)
Facility: HOSPITAL | Age: 82
Discharge: HOME | End: 2022-03-27
Attending: EMERGENCY MEDICINE
Payer: COMMERCIAL

## 2022-03-27 VITALS
SYSTOLIC BLOOD PRESSURE: 130 MMHG | HEART RATE: 62 BPM | RESPIRATION RATE: 18 BRPM | DIASTOLIC BLOOD PRESSURE: 65 MMHG | TEMPERATURE: 97.5 F | OXYGEN SATURATION: 98 %

## 2022-03-27 DIAGNOSIS — R05.9 COUGH: Primary | ICD-10-CM

## 2022-03-27 LAB
2HR DELTA HS TROPONIN: 2 NG/L
ANION GAP SERPL CALCULATED.3IONS-SCNC: 8 MMOL/L (ref 4–13)
BASOPHILS # BLD AUTO: 0.05 THOUSANDS/ΜL (ref 0–0.1)
BASOPHILS NFR BLD AUTO: 1 % (ref 0–1)
BUN SERPL-MCNC: 19 MG/DL (ref 5–25)
CALCIUM SERPL-MCNC: 8.6 MG/DL (ref 8.4–10.2)
CARDIAC TROPONIN I PNL SERPL HS: 10 NG/L
CARDIAC TROPONIN I PNL SERPL HS: 12 NG/L
CHLORIDE SERPL-SCNC: 106 MMOL/L (ref 96–108)
CO2 SERPL-SCNC: 26 MMOL/L (ref 21–32)
CREAT SERPL-MCNC: 1.17 MG/DL (ref 0.6–1.3)
EOSINOPHIL # BLD AUTO: 0.15 THOUSAND/ΜL (ref 0–0.61)
EOSINOPHIL NFR BLD AUTO: 3 % (ref 0–6)
ERYTHROCYTE [DISTWIDTH] IN BLOOD BY AUTOMATED COUNT: 17.7 % (ref 11.6–15.1)
GFR SERPL CREATININE-BSD FRML MDRD: 57 ML/MIN/1.73SQ M
GLUCOSE SERPL-MCNC: 156 MG/DL (ref 65–140)
HCT VFR BLD AUTO: 35.3 % (ref 36.5–49.3)
HGB BLD-MCNC: 11.4 G/DL (ref 12–17)
IMM GRANULOCYTES # BLD AUTO: 0.02 THOUSAND/UL (ref 0–0.2)
IMM GRANULOCYTES NFR BLD AUTO: 0 % (ref 0–2)
LYMPHOCYTES # BLD AUTO: 1.01 THOUSANDS/ΜL (ref 0.6–4.47)
LYMPHOCYTES NFR BLD AUTO: 18 % (ref 14–44)
MCH RBC QN AUTO: 33.7 PG (ref 26.8–34.3)
MCHC RBC AUTO-ENTMCNC: 32.3 G/DL (ref 31.4–37.4)
MCV RBC AUTO: 104 FL (ref 82–98)
MONOCYTES # BLD AUTO: 0.56 THOUSAND/ΜL (ref 0.17–1.22)
MONOCYTES NFR BLD AUTO: 10 % (ref 4–12)
NEUTROPHILS # BLD AUTO: 3.84 THOUSANDS/ΜL (ref 1.85–7.62)
NEUTS SEG NFR BLD AUTO: 68 % (ref 43–75)
NRBC BLD AUTO-RTO: 0 /100 WBCS
PLATELET # BLD AUTO: 137 THOUSANDS/UL (ref 149–390)
PMV BLD AUTO: 10.7 FL (ref 8.9–12.7)
POTASSIUM SERPL-SCNC: 4.1 MMOL/L (ref 3.5–5.3)
RBC # BLD AUTO: 3.38 MILLION/UL (ref 3.88–5.62)
SODIUM SERPL-SCNC: 140 MMOL/L (ref 135–147)
WBC # BLD AUTO: 5.63 THOUSAND/UL (ref 4.31–10.16)

## 2022-03-27 PROCEDURE — 71045 X-RAY EXAM CHEST 1 VIEW: CPT

## 2022-03-27 PROCEDURE — 94640 AIRWAY INHALATION TREATMENT: CPT

## 2022-03-27 PROCEDURE — 36415 COLL VENOUS BLD VENIPUNCTURE: CPT | Performed by: PHYSICIAN ASSISTANT

## 2022-03-27 PROCEDURE — 84484 ASSAY OF TROPONIN QUANT: CPT | Performed by: PHYSICIAN ASSISTANT

## 2022-03-27 PROCEDURE — 99285 EMERGENCY DEPT VISIT HI MDM: CPT

## 2022-03-27 PROCEDURE — 93005 ELECTROCARDIOGRAM TRACING: CPT

## 2022-03-27 PROCEDURE — 99285 EMERGENCY DEPT VISIT HI MDM: CPT | Performed by: PHYSICIAN ASSISTANT

## 2022-03-27 PROCEDURE — 85025 COMPLETE CBC W/AUTO DIFF WBC: CPT | Performed by: PHYSICIAN ASSISTANT

## 2022-03-27 PROCEDURE — 80048 BASIC METABOLIC PNL TOTAL CA: CPT | Performed by: PHYSICIAN ASSISTANT

## 2022-03-27 RX ORDER — BENZONATATE 100 MG/1
100 CAPSULE ORAL 3 TIMES DAILY PRN
Qty: 21 CAPSULE | Refills: 0 | Status: SHIPPED | OUTPATIENT
Start: 2022-03-27

## 2022-03-27 RX ORDER — AMOXICILLIN AND CLAVULANATE POTASSIUM 875; 125 MG/1; MG/1
1 TABLET, FILM COATED ORAL EVERY 12 HOURS
Qty: 14 TABLET | Refills: 0 | Status: SHIPPED | OUTPATIENT
Start: 2022-03-27 | End: 2022-04-03

## 2022-03-27 RX ORDER — AMOXICILLIN AND CLAVULANATE POTASSIUM 875; 125 MG/1; MG/1
1 TABLET, FILM COATED ORAL ONCE
Status: COMPLETED | OUTPATIENT
Start: 2022-03-27 | End: 2022-03-27

## 2022-03-27 RX ORDER — GUAIFENESIN 600 MG
600 TABLET, EXTENDED RELEASE 12 HR ORAL EVERY 12 HOURS SCHEDULED
Qty: 14 TABLET | Refills: 0 | Status: SHIPPED | OUTPATIENT
Start: 2022-03-27 | End: 2022-04-03

## 2022-03-27 RX ORDER — ALBUTEROL SULFATE 2.5 MG/3ML
5 SOLUTION RESPIRATORY (INHALATION) ONCE
Status: COMPLETED | OUTPATIENT
Start: 2022-03-27 | End: 2022-03-27

## 2022-03-27 RX ORDER — SODIUM CHLORIDE 9 MG/ML
3 INJECTION INTRAVENOUS
Status: DISCONTINUED | OUTPATIENT
Start: 2022-03-27 | End: 2022-03-27 | Stop reason: HOSPADM

## 2022-03-27 RX ADMIN — ALBUTEROL SULFATE 5 MG: 2.5 SOLUTION RESPIRATORY (INHALATION) at 12:38

## 2022-03-27 RX ADMIN — AMOXICILLIN AND CLAVULANATE POTASSIUM 1 TABLET: 875; 125 TABLET, FILM COATED ORAL at 16:26

## 2022-03-27 RX ADMIN — IPRATROPIUM BROMIDE 0.5 MG: 0.5 SOLUTION RESPIRATORY (INHALATION) at 12:38

## 2022-03-27 NOTE — DISCHARGE INSTRUCTIONS
Call your family doctor tomorrow  Take Augmentin as directed  You may take Tessalon Perles as needed for cough  This is a prescription strength cough medicine  Take Mucinex twice daily  Stay well hydrated  Mucinex and hydration well thin the mucus and make it easier to clear from your chest   If you cannot see your family doctor, please establish care with a new family doctor  A referral was placed  Return to the emergency department any significant change or worsening of your symptoms  You may take melatonin as needed for inability to sleep

## 2022-03-27 NOTE — ED PROVIDER NOTES
History  Chief Complaint   Patient presents with    Shortness of Breath     pt seen here for same 1 week ago  Completed zpack treatment  States cough/SOB not improved  78-year-old male history of type 2 diabetes, hypertension, hyperlipidemia, CAD and prior episodes of pneumonia presents accompanied by his daughter-in-law complaining of cough  Patient reports he has had a productive cough for approximately the past week  Patient seen in this emergency department approximately 4 days ago where he was prescribed azithromycin which she reports had no relief of his symptoms  He reports that his primary concern is the inability to sleep due to frequent coughing  He denies any fevers, chills, chest pain, shortness of breath, abdominal pain, vomiting, diarrhea, melena hematochezia or any recent lower leg pain or swelling  Patient also notes that his wife passed away a few weeks ago  Patient was prescribed a sedative that they cannot recall the name of to help him sleep prior to this week when the coughing started however the patient discontinue taking it due to not liking the way it made him feel  Prior to Admission Medications   Prescriptions Last Dose Informant Patient Reported? Taking?    Diclofenac Sodium (VOLTAREN) 1 % Not Taking at Unknown time  No No   Sig: Apply 2 g topically 4 (four) times a day   Patient not taking: Reported on 3/27/2022    Heparin Sodium, Porcine, (HEPARIN, PORCINE,) 5,000 units/mL Not Taking at Unknown time  No No   Sig: Inject 1 mL (5,000 Units total) under the skin every 8 (eight) hours   Patient not taking: Reported on 2/13/2020   acetaminophen (TYLENOL) 325 mg tablet   No No   Sig: Take 2 tablets (650 mg total) by mouth every 6 (six) hours as needed for mild pain or fever   allopurinol (ZYLOPRIM) 100 mg tablet Not Taking at Unknown time  Yes No   Sig: Take 100 mg by mouth daily   Patient not taking: Reported on 8/9/2021   atorvastatin (LIPITOR) 10 mg tablet 3/26/2022 at Unknown time  Yes Yes   Sig: Take 40 mg by mouth daily    azithromycin (ZITHROMAX) 250 mg tablet Not Taking at Unknown time  No No   Sig: Take 1 tablet (250 mg total) by mouth every 24 hours for 4 days   Patient not taking: Reported on 3/27/2022    carvedilol (COREG) 6 25 mg tablet 3/27/2022 at Unknown time  No Yes   Sig: Take 1 tablet by mouth twice daily with food   esomeprazole (NexIUM) 20 mg capsule 3/27/2022 at Unknown time  Yes Yes   Sig: Take 20 mg by mouth every morning before breakfast   finasteride (PROSCAR) 5 mg tablet 3/27/2022 at Unknown time  Yes Yes   Sig: Take 5 mg by mouth daily   glipiZIDE (GLUCOTROL) 10 mg tablet 3/27/2022 at Unknown time  Yes Yes   Sig: Take by mouth 2 (two) times a day before meals   levETIRAcetam (KEPPRA) 500 mg tablet 3/27/2022 at Unknown time  Yes Yes   Sig: Take 500 mg by mouth every 12 (twelve) hours   lisinopril (ZESTRIL) 2 5 mg tablet 3/27/2022 at Unknown time  Yes Yes   metFORMIN (GLUCOPHAGE) 500 mg tablet 3/27/2022 at Unknown time  Yes Yes   Sig: Take 500 mg by mouth 3 (three) times a day before meals     methocarbamol (ROBAXIN) 500 mg tablet 3/27/2022 at Unknown time  Yes Yes   Sig: Take 500 mg by mouth 3 (three) times a day   oxyCODONE (ROXICODONE) 5 mg immediate release tablet Not Taking at Unknown time  Yes No   Sig: TAKE 1 TABLET BY MOUTH EVERY 6 HOURS AS NEEDED FOR SEVERE PAIN (PAIN SCORE 7 10) MAX DAILY AMOUNT 20 MG   Patient not taking: Reported on 3/27/2022   pregabalin (LYRICA) 50 mg capsule   No No   Sig: Take 1 capsule (50 mg total) by mouth 3 (three) times a day   sertraline (ZOLOFT) 100 mg tablet Not Taking at Unknown time  Yes No   Sig: Take 50 mg by mouth daily   Patient not taking: Reported on 8/9/2021   tamsulosin (FLOMAX) 0 4 mg 3/27/2022 at Unknown time  Yes Yes   Sig: Take 0 4 mg by mouth daily with dinner      Facility-Administered Medications: None       Past Medical History:   Diagnosis Date    Atrioventricular block, Mobitz type 2     s/p BiV Medtronic pacemaker 2018    CAD (coronary artery disease)     s/p stent ; s/p CABG x3 LIMA-D1 and LAD, SV- LV of RCA 2012    Carotid artery stenosis 2014    Heterogeneous atheroma at the origin of the internal carotid arteries bilaterally causing a mild degree of stenosis on each side   [facility] [address] [facility]     Diabetes mellitus [facility] [address] [facility]     History of echocardiogram 2014    EF 35-40%, technically difficult study   Hyperlipidemia     Hypertension     Ischemic cardiomyopathy     [facility]ox[facility]     Seizures     Skin cancer     Stroke [facility])        Past Surgical History:   Procedure Laterality Date    CARDIAC PACEMAKER PLACEMENT  2018    BiV Medtronic pacemaker    CORONARY ARTERY BYPASS GRAFT  2012    CABG x3 LIMA-D1 and LAD, SV- LV of RCA        Family History   Problem Relation Age of Onset    Alzheimer's disease Mother     Stroke Father     Suicide Attempts Son      I have reviewed and agree with the history as documented  E-Cigarette/Vaping    E-Cigarette Use Never User      E-Cigarette/Vaping Substances     Social History     Tobacco Use    Smoking status: Former Smoker     Quit date:      Years since quittin 2    Smokeless tobacco: Never Used   Vaping Use    Vaping Use: Never used   Substance Use Topics    Alcohol use: Never    Drug use: Never       Review of Systems   Constitutional: Negative for chills, fatigue and fever  HENT: Negative for congestion and sore throat  Eyes: Negative for pain  Respiratory: Positive for cough  Negative for chest tightness, shortness of breath and wheezing  Cardiovascular: Negative for chest pain, palpitations and leg swelling  Gastrointestinal: Negative for abdominal pain, constipation, diarrhea, nausea and vomiting  Endocrine: Negative for polyuria  Genitourinary: Negative for dysuria  Musculoskeletal: Negative for arthralgias, back pain, myalgias and neck pain  Skin: Negative for rash  Neurological: Negative for dizziness, syncope, light-headedness and headaches  Psychiatric/Behavioral: Positive for sleep disturbance  All other systems reviewed and are negative  Physical Exam  Physical Exam  Vitals reviewed  Constitutional:       General: He is not in acute distress  Appearance: Normal appearance  He is well-developed  He is not toxic-appearing  HENT:      Head: Normocephalic and atraumatic  Mouth/Throat:      Mouth: Mucous membranes are moist    Eyes:      Conjunctiva/sclera: Conjunctivae normal    Cardiovascular:      Rate and Rhythm: Normal rate and regular rhythm  Heart sounds: Normal heart sounds  Pulmonary:      Effort: Pulmonary effort is normal       Breath sounds: Normal breath sounds  Comments: Some coarse breath sounds bilaterally that clear with cough  Abdominal:      General: Bowel sounds are normal       Palpations: Abdomen is soft  Tenderness: There is no abdominal tenderness  Musculoskeletal:         General: Normal range of motion  Cervical back: Normal range of motion  Skin:     General: Skin is warm and dry  Capillary Refill: Capillary refill takes less than 2 seconds  Neurological:      General: No focal deficit present  Mental Status: He is alert and oriented to person, place, and time     Psychiatric:         Mood and Affect: Mood normal          Behavior: Behavior normal          Vital Signs  ED Triage Vitals [03/27/22 1207]   Temperature Pulse Respirations Blood Pressure SpO2   97 5 °F (36 4 °C) 62 18 130/65 98 %      Temp Source Heart Rate Source Patient Position - Orthostatic VS BP Location FiO2 (%)   Tympanic Monitor -- -- --      Pain Score       No Pain           Vitals:    03/27/22 1207   BP: 130/65   Pulse: 62         Visual Acuity      ED Medications  Medications   sodium chloride (PF) 0 9 % injection 3 mL (has no administration in time range)   albuterol inhalation solution 5 mg (5 mg Nebulization Given 3/27/22 1238)   ipratropium (ATROVENT) 0 02 % inhalation solution 0 5 mg (0 5 mg Nebulization Given 3/27/22 1238)   amoxicillin-clavulanate (AUGMENTIN) 875-125 mg per tablet 1 tablet (1 tablet Oral Given 3/27/22 1626)       Diagnostic Studies  Results Reviewed     Procedure Component Value Units Date/Time    HS Troponin I 2hr [[id number]]  (Normal) Collected: 03/27/22 1527    Lab Status: Final result Specimen: Blood from Arm, Right Updated: 03/27/22 1558     hs TnI 2hr 12 ng/L      Delta 2hr hsTnI 2 ng/L     HS Troponin I 4hr [[id number]]     Lab Status: No result Specimen: Blood     HS Troponin 0hr (reflex protocol) [[id number]]  (Normal) Collected: 03/27/22 1237    Lab Status: Final result Specimen: Blood from Arm, Left Updated: 03/27/22 1313     hs TnI 0hr 10 ng/L     CBC and differential [[id number]]  (Abnormal) Collected: 03/27/22 1237    Lab Status: Final result Specimen: Blood from Arm, Left Updated: 03/27/22 1312     WBC 5 63 Thousand/uL      RBC 3 38 Million/uL      Hemoglobin 11 4 g/dL      Hematocrit 35 3 %       fL      MCH 33 7 pg      MCHC 32 3 g/dL      RDW 17 7 %      MPV 10 7 fL      Platelets 163 Thousands/uL      nRBC 0 /100 WBCs      Neutrophils Relative 68 %      Immat GRANS % 0 %      Lymphocytes Relative 18 %      Monocytes Relative 10 %      Eosinophils Relative 3 %      Basophils Relative 1 %      Neutrophils Absolute 3 84 Thousands/µL      Immature Grans Absolute 0 02 Thousand/uL      Lymphocytes Absolute 1 01 Thousands/µL      Monocytes Absolute 0 56 Thousand/µL      Eosinophils Absolute 0 15 Thousand/µL      Basophils Absolute 0 05 Thousands/µL     Basic metabolic panel [[id number]]  (Abnormal) Collected: 03/27/22 1237    Lab Status: Final result Specimen: Blood from Arm, Left Updated: 03/27/22 1305     Sodium 140 mmol/L      Potassium 4 1 mmol/L      Chloride 106 mmol/L      CO2 26 mmol/L      ANION GAP 8 mmol/L      BUN 19 mg/dL      Creatinine 1 17 mg/dL Glucose 156 mg/dL      Calcium 8 6 mg/dL      eGFR 57 ml/min/1 73sq m     Narrative:      [place] guidelines for Chronic Kidney Disease (CKD):     Stage 1 with normal or high GFR (GFR > 90 mL/min/1 73 square meters)    Stage 2 Mild CKD (GFR = 60-89 mL/min/1 73 square meters)    Stage 3A Moderate CKD (GFR = 45-59 mL/min/1 73 square meters)    Stage 3B Moderate CKD (GFR = 30-44 mL/min/1 73 square meters)    Stage 4 Severe CKD (GFR = 15-29 mL/min/1 73 square meters)    Stage 5 End Stage CKD (GFR <15 mL/min/1 73 square meters)  Note: GFR calculation is accurate only with a steady state creatinine                 X-ray chest 1 view portable   ED Interpretation by Olga Mahmood PA-C (03/27 1313)   Question increased opacity in left lower lobe      Final Result by Evelyn Patten MD (03/27 1432)      Potential left basilar infiltrate or effusion  Findings are concordant with preliminary interpretation provided in the emergency department  Workstation performed: [id number]                    Procedures  ECG 12 Lead Documentation Only    Date/Time: 3/27/2022 4:35 PM  Performed by: Olga Mahmood PA-C  Authorized by:  Olga Mahmood PA-C     ECG reviewed by me, the ED Provider: yes    Patient location:  ED  Previous ECG:     Previous ECG:  Compared to current    Similarity:  No change    Comparison to cardiac monitor: Yes    Interpretation:     Interpretation: normal    Rate:     ECG rate:  62    ECG rate assessment: normal    Rhythm:     Rhythm: paced    Pacing:     Type of pacing:  Ventricular  Ectopy:     Ectopy: none    QRS:     QRS axis:  Normal  Conduction:     Conduction: normal    ST segments:     ST segments:  Normal  T waves:     T waves: normal    Comments:      No evidence of acute cardiac ischemia             ED Course  ED Course as of 03/27/22 1640   Sun Mar 27, 2022   1312 EKG shows no concordant ST-segment elevation equal to or greater than 1 mm in any lead  No concordant ST-segment depression equal to or greater than 1 mm in lead V1 - V3  No discordant ST/S Ratio equal to or less than -0 25   1521 Patient ambulated in the ED without difficulty  Pulse oximetry was 96% on room air throughout his entire under the lap walking in the ED  he was not short of breath or having any chest pain or dizziness during that time  [address] hsTnI: 2  Delta troponin less than 5  Per Kaela Vazquez's ACS guidelines, acute MI is ruled out  MDM  Number of Diagnoses or Management Options  Cough  Diagnosis management comments: Patient is an 69-year-old male that presents with concerns of a sleep disturbance due to his frequent coughing  Although patient was initially triaged as shortness of breath, patient did not complain of shortness of breath to me  Patient appears to be preoccupied with his inability to sleep that I suspect is more so due to the patient recently losing his wife as opposed to his cough  Suspect likely viral bronchitis however patient does have increased haziness in his left lower lobe on his chest x-ray and I suspect could be a developing pneumonia  Patient is clinically well-appearing  He is afebrile without a leukocytosis  He ambulated in the ED without any difficulty whatsoever and was not hypoxic  Patient was offered admission for further observation, treatment and monitoring with case management evaluation if he does not feel that he can adequately care for himself  Patient declined stating that they felt well enough and would prefer to be discharged and follow-up with [facility] family doctor although he does express some concern of getting an available appointment  A referral was placed to [facility] if patient is unable to see his own PCP  Lengthy discussion with the patient in regards to specific signs and symptoms to watch out for that warrant prompt return to the ED    Patient was informed of the risk of diagnostic uncertainty  Patient expressed their understanding of these instructions, all questions were answered, they were agreeable to plan and very thankful for care  Disposition  Final diagnoses:   Cough     Time reflects when diagnosis was documented in both MDM as applicable and the Disposition within this note     Time User Action Codes Description Comment    3/27/2022  4:27 PM Aleksandar Alexander Add [R05 [id number]] Cough       ED Disposition     ED Disposition Condition Date/Time Comment    Discharge Stable Sun Mar 27, 2022  4:27 PM Randall Rae discharge to home/self care              Follow-up Information     Follow up With Specialties Details Why Contact Info    Ildefonso Haywood DO Family Medicine Schedule an appointment as soon as possible for a visit   [place] [address] [address] [facility]  [phone]            Patient's Medications   Discharge Prescriptions    AMOXICILLIN-CLAVULANATE (AUGMENTIN) 875-125 MG PER TABLET    Take 1 tablet by mouth every 12 (twelve) hours for 7 days       Start Date: 3/27/2022 End Date: 4/3/2022       Order Dose: 1 tablet       Quantity: 14 tablet    Refills: 0    BENZONATATE (TESSALON PERLES) 100 MG CAPSULE    Take 1 capsule (100 mg total) by mouth 3 (three) times a day as needed for cough       Start Date: 3/27/2022 End Date: --       Order Dose: 100 mg       Quantity: 21 capsule    Refills: 0    GUAIFENESIN (MUCINEX) 600 MG 12 HR TABLET    Take 1 tablet (600 mg total) by mouth every 12 (twelve) hours for 7 days       Start Date: 3/27/2022 End Date: 4/3/2022       Order Dose: 600 mg       Quantity: 14 tablet    Refills: 0           PDMP Review       Value Time User    PDMP Reviewed  Yes 1/27/2020 12:57 PM Heladio Ewing, [facility]           ED Provider  Electronically Signed by           Francisco Magana PA-C  03/27/22 2772

## 2022-03-28 LAB
ATRIAL RATE: 133 BPM
ATRIAL RATE: 70 BPM
ATRIAL RATE: 90 BPM
QRS AXIS: 186 DEGREES
QRS AXIS: 190 DEGREES
QRS AXIS: 190 DEGREES
QRSD INTERVAL: 120 MS
QRSD INTERVAL: 122 MS
QRSD INTERVAL: 122 MS
QT INTERVAL: 446 MS
QT INTERVAL: 450 MS
QT INTERVAL: 452 MS
QTC INTERVAL: 456 MS
QTC INTERVAL: 466 MS
QTC INTERVAL: 481 MS
T WAVE AXIS: -3 DEGREES
T WAVE AXIS: -3 DEGREES
T WAVE AXIS: 0 DEGREES
VENTRICULAR RATE: 62 BPM
VENTRICULAR RATE: 64 BPM
VENTRICULAR RATE: 70 BPM

## 2022-03-28 PROCEDURE — 93010 ELECTROCARDIOGRAM REPORT: CPT | Performed by: INTERNAL MEDICINE

## 2022-05-06 NOTE — ASSESSMENT & PLAN NOTE
- Right-sided pneumonia, present on admission   - Continue current antibiotic regimen with plan to complete 5 day course of antibiotics  - Monitor respiratory status  - Encourage incentive spirometer use and adequate pulmonary hygiene  - Supplemental oxygen as needed only to maintain saturations greater than equal to 94%  Consent: The patient's consent was obtained including but not limited to risks of crusting, scabbing, blistering, scarring, darker or lighter pigmentary change, recurrence, incomplete removal and infection. Render Note In Bullet Format When Appropriate: No Spray Paint Text: The liquid nitrogen was applied to the skin utilizing a spray paint frosting technique. Medical Necessity Information: It is in your best interest to select a reason for this procedure from the list below. All of these items fulfill various CMS LCD requirements except the new and changing color options. Detail Level: Detailed Application Tool (Optional): Cry-AC Number Of Freeze-Thaw Cycles: 1 freeze-thaw cycle Show Spray Paint Technique Variable?: Yes Post-Care Instructions: I reviewed with the patient in detail post-care instructions. Patient is to wear sunprotection, and avoid picking at any of the treated lesions. Pt may apply Vaseline to crusted or scabbing areas. Medical Necessity Clause: This procedure was medically necessary because the lesions that were treated were: Duration Of Freeze Thaw-Cycle (Seconds): 5-10 Total Number Of Aks Treated: 22 Duration Of Freeze Thaw-Cycle (Seconds): 3 Detail Level: Simple

## 2022-08-24 ENCOUNTER — APPOINTMENT (EMERGENCY)
Dept: CT IMAGING | Facility: HOSPITAL | Age: 82
End: 2022-08-24
Payer: COMMERCIAL

## 2022-08-24 ENCOUNTER — APPOINTMENT (OUTPATIENT)
Dept: RADIOLOGY | Facility: HOSPITAL | Age: 82
End: 2022-08-24
Payer: COMMERCIAL

## 2022-08-24 ENCOUNTER — HOSPITAL ENCOUNTER (EMERGENCY)
Facility: HOSPITAL | Age: 82
Discharge: HOME | End: 2022-08-24
Attending: EMERGENCY MEDICINE
Payer: COMMERCIAL

## 2022-08-24 VITALS
OXYGEN SATURATION: 95 % | TEMPERATURE: 97.4 F | HEART RATE: 67 BPM | BODY MASS INDEX: 26.26 KG/M2 | DIASTOLIC BLOOD PRESSURE: 75 MMHG | RESPIRATION RATE: 18 BRPM | WEIGHT: 182.98 LBS | SYSTOLIC BLOOD PRESSURE: 164 MMHG

## 2022-08-24 DIAGNOSIS — R20.0 NUMBNESS: Primary | ICD-10-CM

## 2022-08-24 DIAGNOSIS — I65.29 CAROTID STENOSIS: ICD-10-CM

## 2022-08-24 PROBLEM — R20.2 NUMBNESS AND TINGLING OF BOTH LOWER EXTREMITIES: Status: ACTIVE | Noted: 2022-08-24

## 2022-08-24 PROBLEM — F02.818 DEMENTIA DUE TO ANOTHER GENERAL MEDICAL CONDITION, WITH BEHAVIORAL DISTURBANCE: Status: ACTIVE | Noted: 2021-01-27

## 2022-08-24 PROBLEM — Z95.1 HX OF CABG: Status: ACTIVE | Noted: 2018-09-11

## 2022-08-24 PROBLEM — E78.5 HYPERLIPIDEMIA ASSOCIATED WITH TYPE 2 DIABETES MELLITUS (HCC): Status: ACTIVE | Noted: 2020-07-09

## 2022-08-24 PROBLEM — I10 ESSENTIAL HYPERTENSION: Status: ACTIVE | Noted: 2019-04-24

## 2022-08-24 PROBLEM — E11.69 HYPERLIPIDEMIA ASSOCIATED WITH TYPE 2 DIABETES MELLITUS (HCC): Status: ACTIVE | Noted: 2020-07-09

## 2022-08-24 PROBLEM — F02.81: Status: ACTIVE | Noted: 2021-01-27

## 2022-08-24 PROBLEM — K21.9 GERD (GASTROESOPHAGEAL REFLUX DISEASE): Status: ACTIVE | Noted: 2018-09-11

## 2022-08-24 PROBLEM — R47.1 DYSARTHRIA: Status: ACTIVE | Noted: 2018-09-11

## 2022-08-24 PROBLEM — Z95.0 CARDIAC PACEMAKER IN SITU: Status: ACTIVE | Noted: 2019-05-22

## 2022-08-24 PROBLEM — Z86.69 HX OF SEIZURE DISORDER: Status: ACTIVE | Noted: 2018-09-12

## 2022-08-24 PROBLEM — R29.810 FACIAL DROOP: Status: ACTIVE | Noted: 2018-09-11

## 2022-08-24 PROBLEM — M10.9 GOUT: Status: ACTIVE | Noted: 2018-09-11

## 2022-08-24 PROBLEM — R26.9 GAIT ABNORMALITY: Status: ACTIVE | Noted: 2019-05-22

## 2022-08-24 PROBLEM — Z95.0 STATUS POST BIVENTRICULAR CARDIAC PACEMAKER INSERTION: Status: ACTIVE | Noted: 2018-09-14

## 2022-08-24 PROBLEM — N40.0 BPH (BENIGN PROSTATIC HYPERPLASIA): Status: ACTIVE | Noted: 2018-09-11

## 2022-08-24 LAB
2HR DELTA HS TROPONIN: -1 NG/L
ANION GAP SERPL CALCULATED.3IONS-SCNC: 7 MMOL/L (ref 4–13)
APTT PPP: 33 SECONDS (ref 23–37)
ATRIAL RATE: 68 BPM
BILIRUB UR QL STRIP: NEGATIVE
BNP SERPL-MCNC: 818 PG/ML (ref 0–100)
BUN SERPL-MCNC: 28 MG/DL (ref 5–25)
CALCIUM SERPL-MCNC: 9.4 MG/DL (ref 8.4–10.2)
CARDIAC TROPONIN I PNL SERPL HS: 15 NG/L
CARDIAC TROPONIN I PNL SERPL HS: 16 NG/L
CHLORIDE SERPL-SCNC: 104 MMOL/L (ref 96–108)
CLARITY UR: CLEAR
CO2 SERPL-SCNC: 26 MMOL/L (ref 21–32)
COLOR UR: NORMAL
CREAT SERPL-MCNC: 1.36 MG/DL (ref 0.6–1.3)
ERYTHROCYTE [DISTWIDTH] IN BLOOD BY AUTOMATED COUNT: 18.2 % (ref 11.6–15.1)
FLUAV RNA RESP QL NAA+PROBE: NEGATIVE
FLUBV RNA RESP QL NAA+PROBE: NEGATIVE
GFR SERPL CREATININE-BSD FRML MDRD: 47 ML/MIN/1.73SQ M
GLUCOSE SERPL-MCNC: 85 MG/DL (ref 65–140)
GLUCOSE UR STRIP-MCNC: NEGATIVE MG/DL
HCT VFR BLD AUTO: 33.9 % (ref 36.5–49.3)
HGB BLD-MCNC: 11 G/DL (ref 12–17)
HGB UR QL STRIP.AUTO: NEGATIVE
INR PPP: 1.18 (ref 0.84–1.19)
KETONES UR STRIP-MCNC: NEGATIVE MG/DL
LEUKOCYTE ESTERASE UR QL STRIP: NEGATIVE
MCH RBC QN AUTO: 33.7 PG (ref 26.8–34.3)
MCHC RBC AUTO-ENTMCNC: 32.4 G/DL (ref 31.4–37.4)
MCV RBC AUTO: 104 FL (ref 82–98)
NITRITE UR QL STRIP: NEGATIVE
PH UR STRIP.AUTO: 6 [PH]
PLATELET # BLD AUTO: 110 THOUSANDS/UL (ref 149–390)
PMV BLD AUTO: 10.5 FL (ref 8.9–12.7)
POTASSIUM SERPL-SCNC: 3.7 MMOL/L (ref 3.5–5.3)
PROT UR STRIP-MCNC: NEGATIVE MG/DL
PROTHROMBIN TIME: 15 SECONDS (ref 11.6–14.5)
QRS AXIS: 170 DEGREES
QRSD INTERVAL: 150 MS
QT INTERVAL: 452 MS
QTC INTERVAL: 491 MS
RBC # BLD AUTO: 3.26 MILLION/UL (ref 3.88–5.62)
RSV RNA RESP QL NAA+PROBE: NEGATIVE
SARS-COV-2 RNA RESP QL NAA+PROBE: NEGATIVE
SODIUM SERPL-SCNC: 137 MMOL/L (ref 135–147)
SP GR UR STRIP.AUTO: 1.01 (ref 1–1.03)
T WAVE AXIS: -33 DEGREES
UROBILINOGEN UR QL STRIP.AUTO: 0.2 E.U./DL
VENTRICULAR RATE: 71 BPM
WBC # BLD AUTO: 4.23 THOUSAND/UL (ref 4.31–10.16)

## 2022-08-24 PROCEDURE — 85027 COMPLETE CBC AUTOMATED: CPT | Performed by: EMERGENCY MEDICINE

## 2022-08-24 PROCEDURE — 99213 OFFICE O/P EST LOW 20 MIN: CPT | Performed by: STUDENT IN AN ORGANIZED HEALTH CARE EDUCATION/TRAINING PROGRAM

## 2022-08-24 PROCEDURE — 85730 THROMBOPLASTIN TIME PARTIAL: CPT | Performed by: EMERGENCY MEDICINE

## 2022-08-24 PROCEDURE — 97163 PT EVAL HIGH COMPLEX 45 MIN: CPT

## 2022-08-24 PROCEDURE — 70496 CT ANGIOGRAPHY HEAD: CPT

## 2022-08-24 PROCEDURE — 71045 X-RAY EXAM CHEST 1 VIEW: CPT

## 2022-08-24 PROCEDURE — 0241U HB NFCT DS VIR RESP RNA 4 TRGT: CPT | Performed by: EMERGENCY MEDICINE

## 2022-08-24 PROCEDURE — 99285 EMERGENCY DEPT VISIT HI MDM: CPT

## 2022-08-24 PROCEDURE — 81003 URINALYSIS AUTO W/O SCOPE: CPT | Performed by: EMERGENCY MEDICINE

## 2022-08-24 PROCEDURE — 84484 ASSAY OF TROPONIN QUANT: CPT | Performed by: EMERGENCY MEDICINE

## 2022-08-24 PROCEDURE — 36415 COLL VENOUS BLD VENIPUNCTURE: CPT | Performed by: EMERGENCY MEDICINE

## 2022-08-24 PROCEDURE — 97530 THERAPEUTIC ACTIVITIES: CPT

## 2022-08-24 PROCEDURE — 99285 EMERGENCY DEPT VISIT HI MDM: CPT | Performed by: EMERGENCY MEDICINE

## 2022-08-24 PROCEDURE — 80048 BASIC METABOLIC PNL TOTAL CA: CPT | Performed by: EMERGENCY MEDICINE

## 2022-08-24 PROCEDURE — 93005 ELECTROCARDIOGRAM TRACING: CPT

## 2022-08-24 PROCEDURE — 99243 OFF/OP CNSLTJ NEW/EST LOW 30: CPT | Performed by: STUDENT IN AN ORGANIZED HEALTH CARE EDUCATION/TRAINING PROGRAM

## 2022-08-24 PROCEDURE — 93010 ELECTROCARDIOGRAM REPORT: CPT | Performed by: INTERNAL MEDICINE

## 2022-08-24 PROCEDURE — 97166 OT EVAL MOD COMPLEX 45 MIN: CPT

## 2022-08-24 PROCEDURE — 83880 ASSAY OF NATRIURETIC PEPTIDE: CPT | Performed by: EMERGENCY MEDICINE

## 2022-08-24 PROCEDURE — 96360 HYDRATION IV INFUSION INIT: CPT

## 2022-08-24 PROCEDURE — 70498 CT ANGIOGRAPHY NECK: CPT

## 2022-08-24 PROCEDURE — 85610 PROTHROMBIN TIME: CPT | Performed by: EMERGENCY MEDICINE

## 2022-08-24 RX ORDER — ASCORBIC ACID 500 MG
500 TABLET ORAL DAILY
COMMUNITY
Start: 2022-05-12 | End: 2023-05-12

## 2022-08-24 RX ORDER — GABAPENTIN 300 MG/1
CAPSULE ORAL
COMMUNITY
Start: 2022-05-12

## 2022-08-24 RX ADMIN — IOHEXOL 85 ML: 350 INJECTION, SOLUTION INTRAVENOUS at 02:35

## 2022-08-24 RX ADMIN — SODIUM CHLORIDE 500 ML: 0.9 INJECTION, SOLUTION INTRAVENOUS at 06:04

## 2022-08-24 NOTE — ED NOTES
Pt signed AMA form done by Dr Katie Edwards, DEL[facility]  08/24/22 1115       Eufemia Edwards, [facility]  08/24/22 1113

## 2022-08-24 NOTE — PLAN OF CARE
Problem: OCCUPATIONAL THERAPY ADULT  Goal: Performs self-care activities at highest level of function for planned discharge setting  See evaluation for individualized goals  Description: Treatment Interventions: ADL retraining, Functional transfer training, UE strengthening/ROM, Cognitive reorientation, Patient/family training, Compensatory technique education, Energy conservation, Activityengagement    See flowsheet documentation for full assessment, interventions and recommendations  Note: Limitation: Decreased ADL status, Decreased UE strength, Decreased Safe judgement during ADL, Decreased self-care trans, Decreased high-level ADLs  Prognosis: Good  Assessment: Pt is a 78 y o  male seen for OT evaluation s/p admit to [place] [id number] on 8/24/2022 w/ numbness  Comorbidities affecting pt's functional performance at time of assessment include: CAD, Dementia, DM, HLD, HTN, A-fib, Seizures, Skin CA, Stroke  Personal factors affecting pt at time of IE include:steps to enter environment, limited home support, difficulty performing ADLS and limited insight into deficits  Prior to admission, pt was Mod I with ADLs  Upon evaluation: the following deficits impact occupational performance: decreased strength, decreased balance, decreased tolerance and decreased safety awareness  Pt to benefit from continued skilled OT tx while in the hospital to address deficits as defined above and maximize level of functional independence w ADL's and functional mobility  Occupational Performance areas to address include: bathing/shower, toilet hygiene, dressing, functional mobility and clothing management  From OT standpoint, recommendation at time of d/c would be home with Out-patient OT       OT Discharge Recommendation: Home with outpatient rehabilitation     Meghann Mcgregor MS, OTR/L

## 2022-08-24 NOTE — OCCUPATIONAL THERAPY NOTE
Occupational Therapy Evaluation      Jasmin Craig    8/24/2022    Active Problems:    * No active hospital problems  *      Past Medical History:   Diagnosis Date    Atrioventricular block, Mobitz type 2     s/p BiV Medtronic pacemaker 9-    CAD (coronary artery disease)     s/p stent 1997; s/p CABG x3 LIMA-D1 and LAD, SV- LV of RCA 12/18/2012    Carotid artery stenosis 01/16/2014    Heterogeneous atheroma at the origin of the internal carotid arteries bilaterally causing a mild degree of stenosis on each side  Dementia ([facility] )     Diabetes mellitus ([facility] )     History of echocardiogram 01/16/2014    EF 35-40%, technically difficult study  Hyperlipidemia     Hypertension     Ischemic cardiomyopathy     PAF (paroxysmal atrial fibrillation) ([facility])     Seizures     Skin cancer     Stroke [facility])        Past Surgical History:   Procedure Laterality Date    CARDIAC PACEMAKER PLACEMENT  09/13/2018    BiV Medtronic pacemaker    CORONARY ARTERY BYPASS GRAFT  12/18/2012    CABG x3 LIMA-D1 and LAD, SV- LV of RCA         08/24/22 5075   OT Last Visit   OT Visit Date 08/24/22   Note Type   Note type Evaluation   Restrictions/Precautions   Weight Bearing Precautions Per Order No   Other Precautions Cognitive;Multiple lines; Fall Risk;Hard of hearing   Pain Assessment   Pain Assessment Tool 0-10   Pain Score No Pain   Home Living   Type of Home Mobile home   Home Layout One level;Performs ADLs on one level; Able to live on main level with bedroom/bathroom;Stairs to enter with rails  (2 MAYDA)   [address]  (pt has a cane in home & 1 in car)   Additional Comments Difficulty obtaining PLOF due to Pt being [facility]   Prior Function   Level of [place] Independent with ADLs and functional mobility; Needs assistance with IADLs   Lives With Alone   Receives Help From Family   IADLs Needs assistance  (pt reports getting 7 day/wk meals)   Falls in the last 6 months 0   Comments +   ADL UB Bathing Assistance 5  Supervision/Setup   LB Bathing Assistance 4  Minimal Assistance   UB Dressing Assistance 5  Supervision/Setup   LB Dressing Assistance 4  Minimal Assistance   Toileting Assistance  5  Supervision/Setup   Bed Mobility   Additional Comments Pt in bathroom with PT upon arrival   Transfers   Sit to Stand 6  Modified independent   Additional items Assist x 1; Increased time required; Impulsive;Verbal cues   Stand to Sit 6  Modified independent   Additional items Assist x 1; Increased time required; Impulsive;Verbal cues   Functional Mobility   Functional Mobility 5  Supervision   Additional items   (none)   Balance   Static Sitting Normal   Dynamic Sitting Good   Static Standing Fair +   Dynamic Standing Fair   Ambulatory Fair   Activity Tolerance   Activity Tolerance Patient tolerated treatment well   Medical Staff Made Aware DO Genna   RUE Assessment   RUE Assessment X  (AROM WFL)   RUE Strength   RUE Overall Strength Deficits  (3+/5)   LUE Assessment   LUE Assessment WFL   Vision-Basic Assessment   Current Vision Wears glasses only for reading   Cognition   Overall Cognitive Status Impaired   Arousal/Participation Alert; Cooperative   Attention Attends with cues to redirect   Memory Decreased recall of precautions   Following Commands Follows one step commands without difficulty   Assessment   Limitation Decreased ADL status; Decreased UE strength;Decreased Safe judgement during ADL;Decreased self-care trans;Decreased high-level ADLs   Prognosis Good   Assessment Pt is a 78 y o  male seen for OT evaluation s/p admit to [place] [address] on 8/24/2022 w/ numbness  Comorbidities affecting pt's functional performance at time of assessment include: CAD, Dementia, DM, HLD, HTN, A-fib, Seizures, Skin CA, Stroke  Personal factors affecting pt at time of IE include:steps to enter environment, limited home support, difficulty performing ADLS and limited insight into deficits   Prior to admission, pt was Mod I with ADLs  Upon evaluation: the following deficits impact occupational performance: decreased strength, decreased balance, decreased tolerance and decreased safety awareness  Pt to benefit from continued skilled OT tx while in the hospital to address deficits as defined above and maximize level of functional independence w ADL's and functional mobility  Occupational Performance areas to address include: bathing/shower, toilet hygiene, dressing, functional mobility and clothing management  From OT standpoint, recommendation at time of d/c would be home with Out-patient OT  Goals   Patient Goals To call family  (Pt provided with phone upon conclusion)   Plan   Treatment Interventions ADL retraining;Functional transfer training;UE strengthening/ROM; Cognitive reorientation;Patient/family training; Compensatory technique education; Energy conservation; Activityengagement   Goal Expiration Date 09/03/22   OT Treatment Day 0   OT Frequency 3-5x/wk   Recommendation   OT Discharge Recommendation Home with outpatient rehabilitation   Additional Comments  Pt seen as a co-eval with PT due to the patient's co-morbidities, clinically unstable presentation, and present impairments which are a regression from the patient's baseline  Additional Comments 2 The patient's raw score on the AM-PAC Daily Activity inpatient short form is 20, standardized score is 42 03, greater than 39 4  Patients at this level are likely to benefit from discharge to home  Please refer to the recommendation of the Occupational Therapist for safe discharge planning     AM-PAC Daily Activity Inpatient   Lower Body Dressing 3   Bathing 3   Toileting 3   Upper Body Dressing 3   Grooming 4   Eating 4   Daily Activity Raw Score 20   Daily Activity Standardized Score (Calc for Raw Score >=11) 42 03   AM-PAC Applied Cognition Inpatient   Following a Speech/Presentation 3   Understanding Ordinary Conversation 3   Taking Medications 2   Remembering Where Things Are Placed or Put Away 3   Remembering List of 4-5 Errands 2   Taking Care of Complicated Tasks 2   Applied Cognition Raw Score 15   Applied Cognition Standardized Score 33 54     GOALS:    Pt will achieve the following within specified time frame: STG  Pt will achieve the following goals within 5 days    *ADL transfers with (I) for inc'd independence with ADLs/purposeful tasks    *UB ADL with (I) for inc'd independence with self cares    *LB ADL with CGA using AE prn for inc'd independence with self cares    *Toileting with (S) for clothing management and hygiene for return to PLOF with personal care    *Increase static stand balance to G- and dyn stand balance to F+ for inc'd safety with standing purposeful tasks    *Increase stand tolerance x5m for inc'd tolerance with standing purposeful tasks    *Participate in 10m UE therex to increase overall stamina/activity tolerance for purposeful tasks    *Bed mobility- (S) for inc'd independence to manage own comfort and initiate EOB & OOB purposeful tasks    Pt will achieve the following within specified time frame: LTG  Pt will achieve the following goals within 10 days    *LB ADL with (S) using AE prn for inc'd independence with self cares    *Toileting with (I) for clothing management and hygiene for return to PLOF with personal care    *Increase static stand balance to G and dyn stand balance to G- for inc'd safety with standing purposeful tasks    *Increase stand tolerance x10m for inc'd tolerance with standing purposeful tasks    *Bed mobility- (I) for inc'd independence to manage own comfort and initiate EOB & OOB purposeful tasks      Mahogany Rubio MS, OTR/L

## 2022-08-24 NOTE — CONSULTS
[address] 1940, 78 y o  male MRN: [id number]  Unit/Bed#: ED 24 Encounter: [id number]  Primary Care Provider: Betty Sanderson DO   Date and time admitted to hospital: 8/24/2022  1:19 AM    Consult to internal medicine  Consult performed by: James Lopez DO  Consult ordered by: Miladis Ellington DO          Numbness and tingling of both lower extremities  Assessment & Plan  Patient presented to ED for evaluation of bilateral upper extremity numbness and tingling  CTA head revealed:    No acute intracranial abnormality    Stable encephalomalacia/gliosis in the left hemisphere reflecting sequela of remote left MCA territory infarcts    Patent major [place] of [place] vasculature      Severe stenosis proximal left cervical internal carotid artery secondary to predominantly noncalcified atheromatous plaque and slightly progressed compared to prior study dated 9/10/2018  Recommend follow-up vascular consult    Mild stenosis proximal right cervical internal carotid artery (less than 50% by NASCET criteria) similar to prior      Neurology was consulted who recommended patient have MRI C-spine with and without contrast obtained prior to discharge  Patient unfortunately will not wait for MRI C-spine and is choosing to sign out against medical advice  Will message PCP to order outpatient MRI as patient is currently without symptoms  ·     Counseling / Coordination of Care Time: 45 minutes Greater than 50% of total time spent on patient counseling and coordination of care  Collaboration of Care: Were Recommendations Directly Discussed with Primary Treatment Team? Yes    History of Present Illness:  Fadi Garcia is a 78 y o  male who is originally presented to ED for evaluation of bilateral upper extremity numbness  CTA head neck revealed findings as above    Neurology was contacted by ED physician recommended patient have MRI C-spine with and without contrast prior to discharge  Unfortunately patient with pacemaker in would not be able to have MRI obtained for least 24 hours  Patient decided to leave AMA  At time of my examination patient currently without any symptoms  Review of Systems:  Review of Systems   Constitutional: Negative  HENT: Negative  Respiratory: Negative  Cardiovascular: Negative  Gastrointestinal: Negative  Genitourinary: Negative  Musculoskeletal: Negative  Neurological: Positive for numbness  Hematological: Negative  Psychiatric/Behavioral: Negative  Past Medical and Surgical History:   Past Medical History:   Diagnosis Date    Atrioventricular block, Mobitz type 2     s/p BiV Medtronic pacemaker 2018    CAD (coronary artery disease)     s/p stent ; s/p CABG x3 LIMA-D1 and LAD, SV- LV of RCA 2012    Carotid artery stenosis 2014    Heterogeneous atheroma at the origin of the internal carotid arteries bilaterally causing a mild degree of stenosis on each side   [facility] [address] )     Diabetes mellitus ([facility] [address] )     History of echocardiogram 2014    EF 35-40%, technically difficult study      Hyperlipidemia     Hypertension     Ischemic cardiomyopathy     [facility]     Seizures     Skin cancer     Stroke [facility])        Past Surgical History:   Procedure Laterality Date    CARDIAC PACEMAKER PLACEMENT  2018    BiV Medtronic pacemaker    CORONARY ARTERY BYPASS GRAFT  2012    CABG x3 LIMA-D1 and LAD, SV- LV of RCA        Meds/Allergies:  all medications and allergies reviewed    Allergies: No Known Allergies    Social History:  Marital Status: /Civil Union  Substance Use History:   Social History     Substance and Sexual Activity   Alcohol Use Never     Social History     Tobacco Use   Smoking Status Former Smoker    Quit date:     Years since quittin 6   Smokeless Tobacco Never Used     Social History     Substance and Sexual Activity   Drug Use Never       Family History:  Family History   Problem Relation Age of Onset    Alzheimer's disease Mother     Stroke Father     Suicide Attempts Son        Physical Exam:   Vitals:   Blood Pressure: 164/75 (08/24/22 1057)  Pulse: 67 (08/24/22 1057)  Temperature: (!) 97 4 °F (36 3 °C) (08/24/22 0111)  Respirations: 18 (08/24/22 1057)  Weight - Scale: 83 kg (182 lb 15 7 oz) (08/24/22 0109)  SpO2: 95 % (08/24/22 1057)    Physical Exam  HENT:      Head: Normocephalic  Cardiovascular:      Rate and Rhythm: Normal rate and regular rhythm  Pulses: Normal pulses  Heart sounds: Normal heart sounds  Pulmonary:      Effort: Pulmonary effort is normal       Breath sounds: Normal breath sounds  Abdominal:      General: Abdomen is flat  Bowel sounds are normal       Palpations: Abdomen is soft  Musculoskeletal:         General: Normal range of motion  Cervical back: Normal range of motion  Skin:     General: Skin is warm and dry  Neurological:      General: No focal deficit present  Mental Status: He is alert and oriented to person, place, and time  Mental status is at baseline  Cranial Nerves: No cranial nerve deficit  Sensory: No sensory deficit  Motor: No weakness  Psychiatric:         Mood and Affect: Mood normal          Behavior: Behavior normal          Thought Content:  Thought content normal          Judgment: Judgment normal           Additional Data:   Lab Results:    Results from last 7 days   Lab Units 08/24/22  0142   WBC Thousand/uL 4 23*   HEMOGLOBIN g/dL 11 0*   HEMATOCRIT % 33 9*   PLATELETS Thousands/uL 110*     Results from last 7 days   Lab Units 08/24/22  0142   SODIUM mmol/L 137   POTASSIUM mmol/L 3 7   CHLORIDE mmol/L 104   CO2 mmol/L 26   BUN mg/dL 28*   CREATININE mg/dL 1 36*   ANION GAP mmol/L 7   CALCIUM mg/dL 9 4   GLUCOSE RANDOM mg/dL 85     Results from last 7 days   Lab Units 08/24/22  0142   INR  1 18         Lab Results Component Value Date/Time    HGBA1C 5 5 01/22/2020 05:55 PM    HGBA1C 6 7 (H) 05/01/2019 12:45 PM    HGBA1C 7 1 (H) 12/13/2018 11:17 AM    HGBA1C 7 0 (H) 09/11/2018 07:20 AM               Imaging: Reviewed radiology reports from this admission including: CTA head / neck  CTA head and neck w wo contrast   Final Result by Zenovia Dakins, MD (08/24 7689)      No acute intracranial abnormality  Stable encephalomalacia/gliosis in the left hemisphere reflecting sequela of remote left MCA territory infarcts  Patent major [place] of [place] vasculature  Severe stenosis proximal left cervical internal carotid artery secondary to predominantly noncalcified atheromatous plaque and slightly progressed compared to prior study dated 9/10/2018  Recommend follow-up vascular consult  Mild stenosis proximal right cervical internal carotid artery (less than 50% by NASCET criteria) similar to prior  Study was marked in Epic for immediate notification  Workstation performed: [id number]         X-ray chest 1 view portable   Final Result by Demi Mcnair MD (08/24 2923)      Development of increased pulmonary vascular congestion      Persistent cardiomegaly status post CABG                  Workstation performed: [id number]         MRI ED order    (Results Pending)       EKG, Pathology, and Other Studies Reviewed on Admission:   · EKG:  complete right bundle-branch block    ** Please Note: This note may have been constructed using a voice recognition system   **

## 2022-08-24 NOTE — ED NOTES
Pt is fussy wants to  go home an don't want to wait for the MRI, keep walking at the hallway  Provider aware and will speak to the pt  Eufemia Jarvis  08/24/22 1236

## 2022-08-24 NOTE — ED PROCEDURE NOTE
PROCEDURE  ECG 12 Lead Documentation Only    Date/Time: 8/24/2022 1:35 AM  Performed by: Jelani Echeverria MD  Authorized by: Jelani Echeverria MD     Indications / Diagnosis:  Numbness  Patient location:  ED  Interpretation:     Interpretation: normal    Rate:     ECG rate:  71    ECG rate assessment: normal    Rhythm:     Rhythm: sinus rhythm    Ectopy:     Ectopy: none    QRS:     QRS axis:  Normal    QRS intervals:  Normal  Conduction:     Conduction: abnormal      Abnormal conduction: complete RBBB    ST segments:     ST segments:  Non-specific  T waves:     T waves: non-specific           Jelani Echeverria MD  08/24/22 468

## 2022-08-24 NOTE — ED NOTES
Pt ambulating w/ PT   Eufemia Yang, [facility]  08/24/22 2209

## 2022-08-24 NOTE — PLAN OF CARE
Problem: PHYSICAL THERAPY ADULT  Goal: Performs mobility at highest level of function for planned discharge setting  See evaluation for individualized goals  Description: Treatment/Interventions: LE strengthening/ROM, Endurance training, Elevations, Patient/family training, Gait training, Spoke to nursing  Equipment Recommended:  (pt endorses use of SPC at baseline)       See flowsheet documentation for full assessment, interventions and recommendations  Note: Prognosis: Good  Problem List: Decreased strength, Impaired balance, Decreased mobility, Decreased cognition, Impaired judgement, Decreased safety awareness, Impaired hearing, Impaired sensation  Assessment: Pt is 78 y o  male seen for high-complexity PT evaluation on 8/24/2022 s/p admit to [facility] on 8/24/2022 w/ B UE numbness & tingling  PT was consulted to assess pt's functional mobility and d/c needs  Order placed for PT eval and tx  PTA, pt reports living alone in mobile home, (I) ADLs, uses SPC at baseline, drives  At time of eval, pt MOD I for bed mob and transfers, SUP for mobility d/t unsteadiness and impulsivity  Upon evaluation, pt presenting with impaired functional mobility d/t decreased strength, impaired balance, decreased mobility, decreased cognition, impaired judgement, decreased safety awareness, impaired hearing and impaired sensation  Pertinent PMHx and current co-morbidities affecting pt's physical performance at time of assessment include: CAD, dementia, DM, HLD, HTN, ischemic cardiomyopathy, PAF, h/o stroke  Personal factors affecting pt at time of eval include: decreased cognition, limited home support, hearing impairments and limited insight into impairments  The following objective measures performed on IE also reveal limitations: AM-PAC 6-Clicks: 82/26   Pt's clinical presentation is currently unstable/unpredictable seen in pt's presentation of need for input for task focus and mobility technique and ongoing medical assessment  Overall, pt's rehab potential and prognosis to return to PLOF is good as impacted by objective findings, warranting pt to receive further skilled PT interventions to address identified impairments, activity limitation(s), and participation restriction(s)  Goal for patient is to go home  Pt to benefit from continued PT tx to address deficits as defined above and maximize level of functional independent mobility and consistency in order for pt to maintain safety with OOB activity  From PT/mobility standpoint, recommendation at time of d/c would be home with outpatient rehabilitation pending progress in order to facilitate return to PLOF  Barriers to Discharge: Decreased caregiver support     PT Discharge Recommendation: Home with outpatient rehabilitation    See flowsheet documentation for full assessment

## 2022-08-24 NOTE — ASSESSMENT & PLAN NOTE
Patient presented to ED for evaluation of bilateral upper extremity numbness and tingling  CTA head revealed:    No acute intracranial abnormality    Stable encephalomalacia/gliosis in the left hemisphere reflecting sequela of remote left MCA territory infarcts    Patent major [place] of [place] vasculature      Severe stenosis proximal left cervical internal carotid artery secondary to predominantly noncalcified atheromatous plaque and slightly progressed compared to prior study dated 9/10/2018  Recommend follow-up vascular consult    Mild stenosis proximal right cervical internal carotid artery (less than 50% by NASCET criteria) similar to prior      Neurology was consulted who recommended patient have MRI C-spine with and without contrast obtained prior to discharge  Patient unfortunately will not wait for MRI C-spine and is choosing to sign out against medical advice  Will message PCP to order outpatient MRI as patient is currently without symptoms

## 2022-08-24 NOTE — ED PROVIDER NOTES
History  Chief Complaint   Patient presents with    Numbness       79 yo M    Past medical history  Stroke  CAD    HPI:  Patient here for bilateral arm and leg numbness since 8pm   No unilateral weakness  He has generalized weakness    No cough/fever/chills  No abdominal pain or back pain     Patient has no other complaints:    No headache, neck pain, dizziness  No nausea vomiting  No chest pain or pressure      No other complaints        History provided by:  Patient  CVA/TIA-like Symptoms  Presenting symptoms: sensory loss (arms and legs ) and weakness (Generalized)    Presenting symptoms: no headaches    Onset quality:  Gradual  Associated symptoms: no chest pain, no dizziness, no fever, no nausea, no neck pain and no vomiting        Prior to Admission Medications   Prescriptions Last Dose Informant Patient Reported? Taking?    Diclofenac Sodium (VOLTAREN) 1 %   No No   Sig: Apply 2 g topically 4 (four) times a day   Patient not taking: Reported on 3/27/2022    Heparin Sodium, Porcine, (HEPARIN, PORCINE,) 5,000 units/mL   No No   Sig: Inject 1 mL (5,000 Units total) under the skin every 8 (eight) hours   Patient not taking: Reported on 2/13/2020   Omega-3 Fatty Acids (FP FISH OIL PO)   Yes Yes   Sig: Take 1 capsule by mouth daily   acetaminophen (TYLENOL) 325 mg tablet   No Yes   Sig: Take 2 tablets (650 mg total) by mouth every 6 (six) hours as needed for mild pain or fever   allopurinol (ZYLOPRIM) 100 mg tablet   Yes No   Sig: Take 100 mg by mouth daily   Patient not taking: Reported on 8/9/2021   ascorbic acid (VITAMIN C) 500 MG tablet   Yes Yes   Sig: Take 500 mg by mouth daily   atorvastatin (LIPITOR) 10 mg tablet   Yes Yes   Sig: Take 40 mg by mouth daily    benzonatate (TESSALON PERLES) 100 mg capsule   No No   Sig: Take 1 capsule (100 mg total) by mouth 3 (three) times a day as needed for cough   carvedilol (COREG) 6 25 mg tablet   No Yes   Sig: Take 1 tablet by mouth twice daily with food esomeprazole (NexIUM) 20 mg capsule   Yes Yes   Sig: Take 20 mg by mouth every morning before breakfast   finasteride (PROSCAR) 5 mg tablet   Yes Yes   Sig: Take 5 mg by mouth daily   gabapentin (NEURONTIN) 300 mg capsule   Yes Yes   Si cap PO in AM, 1 cap in afternoon, 2 caps at bedtime   glipiZIDE (GLUCOTROL) 10 mg tablet   Yes No   Sig: Take by mouth 2 (two) times a day before meals   levETIRAcetam (KEPPRA) 500 mg tablet   Yes Yes   Sig: Take 500 mg by mouth every 12 (twelve) hours   lisinopril (ZESTRIL) 2 5 mg tablet   Yes Yes   metFORMIN (GLUCOPHAGE) 500 mg tablet   Yes Yes   Sig: Take 500 mg by mouth 3 (three) times a day before meals     methocarbamol (ROBAXIN) 500 mg tablet   Yes Yes   Sig: Take 500 mg by mouth 3 (three) times a day   oxyCODONE (ROXICODONE) 5 mg immediate release tablet   Yes No   Sig: TAKE 1 TABLET BY MOUTH EVERY 6 HOURS AS NEEDED FOR SEVERE PAIN (PAIN SCORE 7 10) MAX DAILY AMOUNT 20 MG   Patient not taking: Reported on 3/27/2022   pregabalin (LYRICA) 50 mg capsule   No No   Sig: Take 1 capsule (50 mg total) by mouth 3 (three) times a day   sertraline (ZOLOFT) 100 mg tablet   Yes No   Sig: Take 50 mg by mouth daily   Patient not taking: Reported on 2021   tamsulosin (FLOMAX) 0 4 mg   Yes Yes   Sig: Take 0 4 mg by mouth daily with dinner      Facility-Administered Medications: None       Past Medical History:   Diagnosis Date    Atrioventricular block, Mobitz type 2     s/p BiV Medtronic pacemaker 2018    CAD (coronary artery disease)     s/p stent ; s/p CABG x3 LIMA-D1 and LAD, SV- LV of RCA 2012    Carotid artery stenosis 2014    Heterogeneous atheroma at the origin of the internal carotid arteries bilaterally causing a mild degree of stenosis on each side   D[facility] [address] [facility]     [facility]     History of echocardiogram 2014    EF 35-40%, technically difficult study      Hyperlipidemia     Hypertension     Ischemic cardiomyopathy  PAF (paroxysmal atrial fibrillation) (HCC)     Seizures     Skin cancer     Stroke [facility])        Past Surgical History:   Procedure Laterality Date    CARDIAC PACEMAKER PLACEMENT  2018    BiV Medtronic pacemaker    CORONARY ARTERY BYPASS GRAFT  2012    CABG x3 LIMA-D1 and LAD, SV- LV of RCA        Family History   Problem Relation Age of Onset    Alzheimer's disease Mother     Stroke Father     Suicide Attempts Son      I have reviewed and agree with the history as documented  E-Cigarette/Vaping    E-Cigarette Use Never User      E-Cigarette/Vaping Substances     Social History     Tobacco Use    Smoking status: Former Smoker     Quit date:      Years since quittin 6    Smokeless tobacco: Never Used   Vaping Use    Vaping Use: Never used   Substance Use Topics    Alcohol use: Never    Drug use: Never       Review of Systems   Constitutional: Negative for chills, diaphoresis, fatigue and fever  Respiratory: Negative for cough, shortness of breath, wheezing and stridor  Cardiovascular: Negative for chest pain, palpitations and leg swelling  Gastrointestinal: Negative for abdominal pain, blood in stool, nausea and vomiting  Genitourinary: Negative for difficulty urinating, dysuria, flank pain and frequency  Musculoskeletal: Negative for arthralgias, back pain, gait problem, joint swelling, myalgias, neck pain and neck stiffness  Skin: Negative for rash and wound  Neurological: Positive for weakness (Generalized) and numbness (Numbness to Arms and legs)  Negative for dizziness, tremors, syncope, speech difficulty, light-headedness and headaches  All other systems reviewed and are negative  Physical Exam  Physical Exam  Constitutional:       General: He is not in acute distress  Appearance: Normal appearance  He is well-developed  He is not ill-appearing, toxic-appearing or diaphoretic  HENT:      Head: Normocephalic and atraumatic        Right Ear: External ear normal       Left Ear: External ear normal       Nose: Nose normal    Eyes:      General: No scleral icterus  Right eye: No discharge  Left eye: No discharge  Extraocular Movements: Extraocular movements intact  Conjunctiva/sclera: Conjunctivae normal       Pupils: Pupils are equal, round, and reactive to light  Neck:      Vascular: No JVD  Trachea: No tracheal deviation  Cardiovascular:      Rate and Rhythm: Normal rate and regular rhythm  Pulses: Normal pulses  Heart sounds: Normal heart sounds  No murmur heard  No friction rub  No gallop  Pulmonary:      Effort: Pulmonary effort is normal  No respiratory distress  Breath sounds: Normal breath sounds  No stridor  No wheezing, rhonchi or rales  Chest:      Chest wall: No tenderness  Abdominal:      General: Bowel sounds are normal  There is no distension  Palpations: Abdomen is soft  There is no mass  Tenderness: There is no abdominal tenderness  There is no right CVA tenderness, left CVA tenderness, guarding or rebound  Hernia: No hernia is present  Musculoskeletal:         General: No swelling, tenderness, deformity or signs of injury  Normal range of motion  Cervical back: Normal range of motion and neck supple  No rigidity or tenderness  Right lower leg: No edema  Left lower leg: No edema  Lymphadenopathy:      Cervical: No cervical adenopathy  Skin:     General: Skin is warm  Capillary Refill: Capillary refill takes less than 2 seconds  Coloration: Skin is not jaundiced or pale  Findings: No bruising, erythema, lesion or rash  Neurological:      General: No focal deficit present  Mental Status: He is alert and oriented to person, place, and time  Mental status is at baseline  Cranial Nerves: No cranial nerve deficit  Sensory: Sensory deficit (Patient reports numbness in the bilateral upper and lower extremities    Does appear he does have intact sensation) present  Motor: No weakness or abnormal muscle tone  Coordination: Coordination normal       Gait: Gait normal    Psychiatric:         Mood and Affect: Mood normal          Behavior: Behavior normal          Thought Content: Thought content normal          Judgment: Judgment normal          Vital Signs  ED Triage Vitals [08/24/22 0111]   Temperature Pulse Respirations Blood Pressure SpO2   (!) 97 4 °F (36 3 °C) 87 18 154/69 97 %      Temp src Heart Rate Source Patient Position - Orthostatic VS BP Location FiO2 (%)   -- -- -- -- --      Pain Score       3           Vitals:    08/24/22 0111   BP: 154/69   Pulse: 87         Visual Acuity      ED Medications  Medications - No data to display    Diagnostic Studies  Results Reviewed     None                 No orders to display              Procedures  Procedures         ED Course  ED Course as of 08/24/22 0627   Wed Aug 24, 2022   0132 Patient seen and evaluated    He is here for diffuse numbness in the arms and legs and generalized weakness and difficulty walking  There is no ataxia described by family    He is outside the window for tPA and also he only has sensory deficits so for this reason I will not make him a stroke alert but he will have a stroke evaluation at this time   2739 Basic metabolic panel(!)   2496 CBC and Platelet(!)   1957 hs TnI 0hr: 16   0249 Protime-INR(!)   0249 PTT: 33   0314 CTA NECK AND BRAIN WITH AND WITHOUT CONTRAST       IMPRESSION:     No acute intracranial abnormality      Stable encephalomalacia/gliosis in the left hemisphere reflecting sequela of remote left MCA territory infarcts      Patent major [place] of [place] vasculature        Severe stenosis proximal left cervical internal carotid artery secondary to predominantly noncalcified atheromatous plaque and slightly progressed compared to prior study dated 9/10/2018    Recommend follow-up vascular consult      Mild stenosis proximal right cervical internal carotid artery (less than 50% by NASCET criteria) similar to prior  [address] Anoop Horta, Neurology on for Stroke    Reviewed the case   Since no fall/trauma, and symptoms are bilateral MRI brain is not necessary  No concern for stroke based on bilateral nature of symptoms  Pt can have MRI Cspine w/ and w/o and if negative can be discharged   0425 Delta 2hr hsTnI: -1   0426 BNP(!): 818   0426 FLU/RSV/COVID - if FLU/RSV clinically relevant   0448 Ambulatory trial was unremarkable  Oxygen saturations were 97%  Patient ambulated without assistance    He states his numbness and symptoms are improved  He also related that he has been very depressed since the passing of his wife 6 months ago    I offer the patient inpatient psychiatric care based on the depression he had related to nursing staff    He states he does not feel like he needs to stay for inpatient psychiatric care, wants to go home                                             MDM    Disposition  Final diagnoses:   None     ED Disposition     None      Follow-up Information    None         Patient's Medications   Discharge Prescriptions    No medications on file       No discharge procedures on file      PDMP Review       Value Time User    PDMP Reviewed  Yes 1/27/2020 12:57 PM Heladio Ewing, [facility]          ED Provider  Electronically Signed by           Brayan Rain MD  08/24/22 0130

## 2022-08-24 NOTE — ED NOTES
Patient requesting multiple times to this RN that he wants to leave  This request was reiterated to the RN taking care of him and provider  Provider at bedside at this time        Lata Pena RN  08/24/22 1415

## 2022-08-24 NOTE — PHYSICAL THERAPY NOTE
Physical Therapy Evaluation   Time in: 0740  Time out: 0752  Total evaluation time: 12 minutes    Patient's Name: Aminata Mitchell    Admitting Diagnosis  Numbness [R20 0]    Problem List  Patient Active Problem List   Diagnosis    Subdural hematoma (HCC)    Hypoglycemia    Encephalopathy    Pneumonia involving right lung    Biventricular pacemaker check    Coronary artery disease involving native coronary artery of native heart without angina pectoris    Chronic combined systolic (congestive) and diastolic (congestive) heart failure ([address] )    Type 2 diabetes mellitus, without long-term current use of insulin (HCC)    Seizure disorder ([facility] )    CVA (cerebral vascular accident) ([facility] )    Persistent atrial fibrillation ([facility] [address] )    Peripheral polyneuropathy    Lumbar radiculopathy    Lumbar degenerative disc disease    Lumbar spondylosis    BPH (benign prostatic hyperplasia)    Cardiac pacemaker in situ    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, with behavioral disturbance ([address] )    Dysarthria    Essential hypertension    Facial droop    Gait abnormality    GERD (gastroesophageal reflux disease)    Gout    Hx of CABG    Hx of seizure disorder    Hyperlipidemia associated with type 2 diabetes mellitus ([facility] [address] )    Intermediate coronary syndrome ([facility] [address] )    Status post biventricular cardiac pacemaker insertion    Numbness and tingling of both lower extremities       Past Medical History  Past Medical History:   Diagnosis Date    Atrioventricular block, Mobitz type 2     s/p BiV Medtronic pacemaker 9-    CAD (coronary artery disease)     s/p stent 1997; s/p CABG x3 LIMA-D1 and LAD, SV- LV of RCA 12/18/2012    Carotid artery stenosis 01/16/2014    Heterogeneous atheroma at the origin of the internal carotid arteries bilaterally causing a mild degree of stenosis on each side  Dementia ([facility] [address] )     Diabetes mellitus ([facility] [address] )     History of echocardiogram 01/16/2014    EF 35-40%, technically difficult study      Hyperlipidemia Hypertension     Ischemic cardiomyopathy     PAF (paroxysmal atrial fibrillation) (HCC)     Seizures     Skin cancer     Stroke [facility])        Past Surgical History  Past Surgical History:   Procedure Laterality Date    CARDIAC PACEMAKER PLACEMENT  09/13/2018    BiV Medtronic pacemaker    CORONARY ARTERY BYPASS GRAFT  12/18/2012    CABG x3 LIMA-D1 and LAD, SV- LV of RCA        PT performed at least 2 patient identifiers during session: Name and wristband  08/24/22 0753   PT Last Visit   PT Visit Date 08/24/22   Note Type   Note type Evaluation   Pain Assessment   Pain Assessment Tool 0-10   Pain Score No Pain   Restrictions/Precautions   Weight Bearing Precautions Per Order No   Other Precautions Cognitive; Impulsive;Multiple lines; Fall Risk;Hard of hearing  (pt reports R ear is better for hearing)   Home Living   Type of [address] [place] [address] One level;Performs ADLs on one level; Able to live on main level with bedroom/bathroom;Stairs to enter with rails  (2 MAYDA)   [facility]  (pt has a cane in home & 1 in car)   Prior Function   Level of [place] Independent with ADLs and functional mobility; Needs assistance with IADLs   Lives With Alone   Receives Help From Family   ADL Assistance Independent   IADLs Needs assistance  (pt reports getting 7 day/wk meals - but could not provide services that he receives to home)   Falls in the last 6 months 0   Comments pt drives   General   Family/Caregiver Present No   Cognition   Overall Cognitive Status Impaired   Arousal/Participation Alert   Attention Attends with cues to redirect   Orientation Level Oriented to person   Memory Decreased recall of precautions   Following Commands Follows one step commands without difficulty   Comments pt agreeable to PT session; impulsive but easily redirectable   Subjective   Subjective "I want to go"   RLE Assessment   RLE Assessment X   Strength RLE   R Hip Flexion 3+/5   R Knee Extension 4/5   R Ankle Dorsiflexion 4+/5   LLE Assessment   LLE Assessment X   Strength LLE   L Hip Flexion 4/5   L Knee Extension 4+/5   L Ankle Dorsiflexion 4+/5   Vision-Basic Assessment   Current Vision Wears glasses only for reading   Coordination   Sensation X  (B neuropathy in hands/fingers)   Light Touch   RLE Light Touch Grossly intact   LLE Light Touch Grossly intact   Bed Mobility   Supine to Sit 6  Modified independent   Additional items HOB elevated; Impulsive   Transfers   Sit to Stand 6  Modified independent   Additional items Assist x 1; Increased time required   Stand to Sit 6  Modified independent   Ambulation/Elevation   Gait pattern Improper Weight shift;Decreased foot clearance; Short stride  (+veering)   Gait Assistance 5  Supervision   Additional items Assist x 1   Assistive Device None   Distance 100 ft   Stair Management Assistance Not tested   Balance   Static Sitting Normal   Dynamic Sitting Good   Static Standing Good   Dynamic Standing Fair +   Ambulatory Fair +   Endurance Deficit   Endurance Deficit Yes   Activity Tolerance   Activity Tolerance Patient tolerated treatment well   Medical Staff Made Aware ED provider made aware of outcomes/recs   Nurse Made Aware Yes, RN Sylvia Goodman made aware of outcomes/recs   Assessment   Prognosis Good   Problem List Decreased strength; Impaired balance;Decreased mobility; Decreased cognition; Impaired judgement;Decreased safety awareness; Impaired hearing; Impaired sensation   Assessment Pt is 78 y o  male seen for high-complexity PT evaluation on 8/24/2022 s/p admit to [facility] on 8/24/2022 w/ B UE numbness & tingling  PT was consulted to assess pt's functional mobility and d/c needs  Order placed for PT eval and tx  PTA, pt reports living alone in mobile home, (I) ADLs, uses SPC at baseline, drives  At time of eval, pt MOD I for bed mob and transfers, SUP for mobility d/t unsteadiness and impulsivity   Upon evaluation, pt presenting with impaired functional mobility d/t decreased strength, impaired balance, decreased mobility, decreased cognition, impaired judgement, decreased safety awareness, impaired hearing and impaired sensation  Pertinent PMHx and current co-morbidities affecting pt's physical performance at time of assessment include: CAD, dementia, DM, HLD, HTN, ischemic cardiomyopathy, PAF, h/o stroke  Personal factors affecting pt at time of eval include: decreased cognition, limited home support, hearing impairments and limited insight into impairments  The following objective measures performed on IE also reveal limitations: AM-PAC 6-Clicks: 15/73  Pt's clinical presentation is currently unstable/unpredictable seen in pt's presentation of need for input for task focus and mobility technique and ongoing medical assessment  Overall, pt's rehab potential and prognosis to return to PLOF is good as impacted by objective findings, warranting pt to receive further skilled PT interventions to address identified impairments, activity limitation(s), and participation restriction(s)  Goal for patient is to go home  Pt to benefit from continued PT tx to address deficits as defined above and maximize level of functional independent mobility and consistency in order for pt to maintain safety with OOB activity  From PT/mobility standpoint, recommendation at time of d/c would be home with outpatient rehabilitation pending progress in order to facilitate return to PLOF     Barriers to Discharge Decreased caregiver support   Goals   Patient Goals "to go home"   STG Expiration Date 09/03/22   Short Term Goal #1 In 7-10 days: Increase bilateral LE strength 1/2 grade to facilitate independent mobility, Ambulate > 300 ft  with least restrictive assistive device independently w/o LOB and w/ normalized gait pattern 100% of the time, Navigate 4 stairs modified independent with unilateral handrail to facilitate return to previous living environment, Increase all balance 1/2 grade to decrease risk for falls and Complete exercise program independently   PT Treatment Day 1  (see treatment note below)   Plan   Treatment/Interventions LE strengthening/ROM; Endurance training;Elevations; Patient/family training;Gait training;Spoke to nursing   PT Frequency 2-3x/wk   Recommendation   PT Discharge Recommendation Home with outpatient rehabilitation   Equipment Recommended   (pt endorses use of SPC at baseline)   Additional Comments Pt's raw score on the [facility] Basic Mobility inpatient short form is 22, standardized score is 47 4  Patients at this level are likely to benefit from DC to home with no services, however, please refer to therapist recommendation for safe DC planning  [facility] Basic Mobility Inpatient   Turning in Bed Without Bedrails 4   Lying on Back to Sitting on Edge of Flat Bed 4   Moving Bed to Chair 4   Standing Up From Chair 4   Walk in Room 3   Climb 3-5 Stairs 3   Basic Mobility Inpatient Raw Score 22   Basic Mobility Standardized Score 47 4   Highest Level Of Mobility   JH-HLM Goal 7: Walk 25 feet or more   JH-HLM Achieved 7: Walk 25 feet or more   Additional Treatment Session   Start Time 0752   End Time 0806  (total treatment time = 14 minutes)   Treatment Assessment Pt seen for PT treatment session this date with interventions consisting of gait training w/ emphasis on improving pt's ability to ambulate level surfaces x 50 ft x 2 with modified independent provided by therapist with no AD and therapeutic activity consisting of training: sit<>stand transfers and vc and tactile cues for static standing posture faciliation  Pt agreeable to PT treatment session upon arrival, in no apparent distress  Continue to recommend home with outpatient rehabilitation at time of d/c in order to maximize pt's functional independence and safety w/ mobility  Pt continues to be functioning below baseline level, and remains limited 2* factors listed above   PT will continue to see pt during current hospitalization in order to address the deficits listed above and provide interventions consistent w/ POC in effort to achieve STGs  Equipment Use none as pt amb'ed s AD   Additional Treatment Day 1   Exercises   Balance training  pt ambulated 50 ft x 2 MOD I s AD and toileted on standard toilet with SUP/mod I with no observed LOB   End of Consult   Patient Position at End of Consult Seated edge of bed; All needs within reach  (RN assisting pt with dialing phone)       Laura Johnson, PT, DPT

## 2023-05-21 NOTE — DISCHARGE INSTRUCTIONS
Neurosurgery discharge instructions following traumatic head bleed:      Do not take any blood thinning medications (ie  No Advil  No motrin  No ibuprofen  No Aleve  No Aspirin  No fishoil  No heparin  No antiplatelet / no anticoagulation medication)   Refrain from activity that increases chance of trauma to head or falls  Recommend you take fall precaution   No strenuous activity or sports   Return to hospital Emergency Room if you experience worsening / new headache, nausea/vomiting, speech/vision change, seizure, confusion / mental status change, weakness, or other neurological changes  Follow-up as scheduled with a repeat CT head without contrast to be completed 2-3 days prior to visit  Prescription has been entered electronically  Please call  to schedule  no loss of consciousness, no gait abnormality, no headache, no sensory deficits, and no weakness.